# Patient Record
Sex: MALE | Race: WHITE | Employment: FULL TIME | URBAN - NONMETROPOLITAN AREA
[De-identification: names, ages, dates, MRNs, and addresses within clinical notes are randomized per-mention and may not be internally consistent; named-entity substitution may affect disease eponyms.]

---

## 2017-02-09 ENCOUNTER — TELEPHONE (OUTPATIENT)
Dept: NEUROLOGY | Age: 42
End: 2017-02-09

## 2021-09-20 ENCOUNTER — HOSPITAL ENCOUNTER (OUTPATIENT)
Dept: GENERAL RADIOLOGY | Facility: HOSPITAL | Age: 46
Discharge: HOME | End: 2021-09-20
Admitting: FAMILY MEDICINE

## 2021-09-20 ENCOUNTER — TRANSCRIBE ORDERS (OUTPATIENT)
Dept: ADMINISTRATIVE | Facility: HOSPITAL | Age: 46
End: 2021-09-20

## 2021-09-20 DIAGNOSIS — R10.9 ABDOMINAL PAIN, UNSPECIFIED ABDOMINAL LOCATION: Primary | ICD-10-CM

## 2021-09-20 DIAGNOSIS — R10.9 ABDOMINAL PAIN, UNSPECIFIED ABDOMINAL LOCATION: ICD-10-CM

## 2021-09-20 PROCEDURE — 74018 RADEX ABDOMEN 1 VIEW: CPT

## 2022-03-01 NOTE — PROGRESS NOTES
"Chief Complaint:   Chief Complaint   Patient presents with   • Anemia     Pt had labs 12/2021 for PCP-showed he was anemic-had them repeated in Feb and it was even lower so he referred him to our office   • Abdominal Pain     Pt does c/o intermittent RUQ pain and sharp LLQ pains at times         Patient ID: Alicia Macedo is a 46 y.o. male     History of Present Illness: This is a very pleasant 46-year-old male who is here today with complaints of right upper quadrant pain, left lower quadrant abdominal pain and anemia.    The patient was referred to our office per Dr. Cristian Deleon for anemia.  Records were not sent to me of labs however the patient is able to access this on his phone.  Patient had labs performed on 12/2021 with low hemoglobin.  Repeat labs performed on 2/7/2022 CBC revealed a hemoglobin of 10 the patient has not had a colonoscopy in the past.  There is no known family history of colon cancer or colon polyps.  The patient states over the past year he has had intermittent left lower quadrant abdominal pain that he describes as \"sharp, pressure, and gets so intense I sweat.\"  He states that he has probably had 2-3 episodes of this last month and then one episode on Monday after eating spaghetti.  He states that he has also had some intermittent right upper quadrant pain \"it is just sore to touch but nowhere near the pain like of had a mild left lower quadrant.\"  He states when this occurred in September KUB was ordered as noted below.  The patient tells me he did have an EGD at [facility] somewhere around 2009 and it was found to be normal.      Study Result    Narrative & Impression   EXAMINATION: XR ABDOMEN KUB-  9/20/2021 12:50 PM CDT     HISTORY: unspecified abdominal pain; R10.9-Unspecified abdominal pain     COMPARISON: None     IMPRESSION:     1.  Mild fecal stasis.     2.  Findings suspicious for ankylosing spondylitis.         This report was finalized on 09/20/2021 12:52 by Dr. Pena " MD Adriel.    Past Medical History:   Diagnosis Date   • Ankylosing spondylitis (HCC)    • GERD (gastroesophageal reflux disease)    • Gout    • Hyperlipidemia    • Hypertension    • Osteoarthritis    • Sleep apnea        Past Surgical History:   Procedure Laterality Date   • CHOLECYSTECTOMY           Current Outpatient Medications:   •  allopurinol (ZYLOPRIM) 300 MG tablet, Take 1 tablet by mouth Daily., Disp: , Rfl:   •  atenolol (TENORMIN) 25 MG tablet, Take 1 tablet by mouth Daily., Disp: , Rfl:   •  dapagliflozin-metformin HCl ER (Xigduo XR) 5-1000 MG tablet, Take 1 tablet by mouth Daily., Disp: , Rfl:   •  Etanercept (Enbrel SureClick) 50 MG/ML solution auto-injector, Inject 1 mL under the skin into the appropriate area as directed 1 (One) Time Per Week., Disp: , Rfl:   •  folic acid (FOLVITE) 1 MG tablet, Take 1,000 mcg by mouth Daily., Disp: , Rfl:   •  lisinopril-hydrochlorothiazide (PRINZIDE,ZESTORETIC) 20-25 MG per tablet, Take 1 tablet by mouth Daily., Disp: , Rfl:   •  predniSONE (DELTASONE) 5 MG tablet, Take 1 tablet by mouth Daily., Disp: , Rfl:   •  rosuvastatin (CRESTOR) 10 MG tablet, Take 1 tablet by mouth Daily., Disp: , Rfl:   •  Sodium Sulfate-Mag Sulfate-KCl 6706-608-153 MG tablet, Take 12 tablets by mouth Take As Directed., Disp: 24 tablet, Rfl: 0    No Known Allergies    Social History     Socioeconomic History   • Marital status:    Tobacco Use   • Smoking status: Never Smoker   • Smokeless tobacco: Never Used   Vaping Use   • Vaping Use: Never used   Substance and Sexual Activity   • Alcohol use: Not Currently   • Drug use: Defer   • Sexual activity: Defer       Family History   Problem Relation Age of Onset   • Cirrhosis Father 47   • Colon cancer Neg Hx    • Colon polyps Neg Hx    • Esophageal cancer Neg Hx    • Liver cancer Neg Hx    • Liver disease Neg Hx    • Rectal cancer Neg Hx    • Stomach cancer Neg Hx        Vitals:    03/02/22 1311   BP: 118/70   BP Location: Left arm  "  Patient Position: Sitting   Cuff Size: Adult   Pulse: 94   Temp: 97.6 °F (36.4 °C)   TempSrc: Infrared   SpO2: 95%   Weight: 99.3 kg (219 lb)   Height: 160 cm (63\")       Review of Systems:    General:    Present -feeling well   Skin:    Not Present-Rash   HEENT:     Not Present-Acute visual changes or Acute hearing changes   Neck :    Not Present- swollen glands   Genitourinary:      Not Present- burning, frequency, urgency hematuria, dysuria,   Cardiovascular:   Not Present-chest pain, palpitations, or pressure   Respiratory:   Not Present- shortness of breath or cough   Gastrointestinal:  Musculoskeletal:  Neurological:  Psychiatric:   Present as mentioned in the HP    Not Present. Recent gait disturbances.    Not Present-Seizures and weakness in extremities.    Not Present- Anxiety or Depression.       Physical Exam:    General Appearance:    Alert, cooperative, in no acute distress   Psych:    Mood appropriate    Eyes:          conjunctivae and sclerae normal, no   icterus, no pallor   ENMT:    Ears appear intact with no abnormalities noted oral mucosa moist   Neck:   No adenopathy, supple, trachea midline, no thyromegaly, no   carotid bruit, no JVD    Cardiovascular:    Regular rhythm and normal rate, normal S1 and S2, no            murmur, no gallop, no rub, no click   Gastrointestinal:     Inspection normal.  Normal bowel sounds, no masses, no organomegaly, soft round non-tender, non-distended, no guarding, no rebound or tenderness. No hepatosplenomegaly.   Skin:   No bleeding, bruising or rash   Neurologic:   nonfocal       Assessment and Plan:  Assessment/Plan   Diagnoses and all orders for this visit:    1. Anemia, unspecified type (Primary)  -     Case Request; Standing  -     Case Request    2. Right upper quadrant abdominal pain  -     Case Request; Standing  -     Case Request    3. Left lower quadrant abdominal pain  -     Case Request; Standing  -     Case Request    Other orders  -     Follow " Anesthesia Guidelines / Protocol; Future  -     Obtain Informed Consent; Future  -     Sodium Sulfate-Mag Sulfate-KCl 5826-777-011 MG tablet; Take 12 tablets by mouth Take As Directed.  Dispense: 24 tablet; Refill: 0      Schedule patient for both EGD and colonoscopy.       There are no Patient Instructions on file for this visit.    Next follow-up appointment      The risks, benefits, and alternatives of endoscopy were reviewed with the patient today.  Risks including perforation, with or without dilation, possibly requiring surgery.  Additional risks include risk of bleeding.  There is also the risk of a drug reaction or problems with anesthesia.  This will be discussed with the further by the anesthesia team on the day of the procedure. The benefits include the diagnosis and management of disease of the upper digestive tract.  Alternatives to endoscopy include upper GI series, laboratory testing, radiographic evaluation, or no intervention.  The patient verbalizes understanding and agrees.    The risks, benefits, and alternatives of colonoscopy were reviewed with the patient today.  Risks including perforation of the colon possibly requiring surgery or colostomy.  Additional risks include risk of bleeding from biopsies or removal of colon tissue.  There is also the risk of a drug reaction or problems with anesthesia.  This will be discussed with the further by the anesthesia team on the day of the procedure.  Lastly there is a possibility of missing a colon polyp or cancer.  The benefits include the diagnosis and management of disease of the colon and rectum.  Alternatives to colonoscopy include barium enema, laboratory testing, radiographic evaluation, or no intervention.  The patient verbalizes understanding and agrees.        EMR Dragon/Transcription disclaimer:  Much of this encounter note is an electronic transcription/translation of spoken language to printed text. The electronic translation of spoken  language may permit erroneous, or at times, nonsensical words or phrases to be inadvertently transcribed; although I have reviewed the note for such errors, some may still exist.

## 2022-03-01 NOTE — H&P (VIEW-ONLY)
"Chief Complaint:   Chief Complaint   Patient presents with   • Anemia     Pt had labs 12/2021 for PCP-showed he was anemic-had them repeated in Feb and it was even lower so he referred him to our office   • Abdominal Pain     Pt does c/o intermittent RUQ pain and sharp LLQ pains at times         Patient ID: Alicia Macedo is a 46 y.o. male     History of Present Illness: This is a very pleasant 46-year-old male who is here today with complaints of right upper quadrant pain, left lower quadrant abdominal pain and anemia.    The patient was referred to our office per Dr. Cristian Deleon for anemia.  Records were not sent to me of labs however the patient is able to access this on his phone.  Patient had labs performed on 12/2021 with low hemoglobin.  Repeat labs performed on 2/7/2022 CBC revealed a hemoglobin of 10 the patient has not had a colonoscopy in the past.  There is no known family history of colon cancer or colon polyps.  The patient states over the past year he has had intermittent left lower quadrant abdominal pain that he describes as \"sharp, pressure, and gets so intense I sweat.\"  He states that he has probably had 2-3 episodes of this last month and then one episode on Monday after eating spaghetti.  He states that he has also had some intermittent right upper quadrant pain \"it is just sore to touch but nowhere near the pain like of had a mild left lower quadrant.\"  He states when this occurred in September KUB was ordered as noted below.  The patient tells me he did have an EGD at [facility], [place], operated by [facility] somewhere around 2009 and it was found to be normal.      Study Result    Narrative & Impression   EXAMINATION: XR ABDOMEN KUB-  9/20/2021 12:50 PM CDT     HISTORY: unspecified abdominal pain; R10.9-Unspecified abdominal pain     COMPARISON: None     IMPRESSION:     1.  Mild fecal stasis.     2.  Findings suspicious for ankylosing spondylitis.         This report was finalized on 09/20/2021 12:52 by Dr. Pena " MD Adriel.    Past Medical History:   Diagnosis Date   • Ankylosing spondylitis (HCC)    • GERD (gastroesophageal reflux disease)    • Gout    • Hyperlipidemia    • Hypertension    • Osteoarthritis    • Sleep apnea        Past Surgical History:   Procedure Laterality Date   • CHOLECYSTECTOMY           Current Outpatient Medications:   •  allopurinol (ZYLOPRIM) 300 MG tablet, Take 1 tablet by mouth Daily., Disp: , Rfl:   •  atenolol (TENORMIN) 25 MG tablet, Take 1 tablet by mouth Daily., Disp: , Rfl:   •  dapagliflozin-metformin HCl ER (Xigduo XR) 5-1000 MG tablet, Take 1 tablet by mouth Daily., Disp: , Rfl:   •  Etanercept (Enbrel SureClick) 50 MG/ML solution auto-injector, Inject 1 mL under the skin into the appropriate area as directed 1 (One) Time Per Week., Disp: , Rfl:   •  folic acid (FOLVITE) 1 MG tablet, Take 1,000 mcg by mouth Daily., Disp: , Rfl:   •  lisinopril-hydrochlorothiazide (PRINZIDE,ZESTORETIC) 20-25 MG per tablet, Take 1 tablet by mouth Daily., Disp: , Rfl:   •  predniSONE (DELTASONE) 5 MG tablet, Take 1 tablet by mouth Daily., Disp: , Rfl:   •  rosuvastatin (CRESTOR) 10 MG tablet, Take 1 tablet by mouth Daily., Disp: , Rfl:   •  Sodium Sulfate-Mag Sulfate-KCl 1468-938-759 MG tablet, Take 12 tablets by mouth Take As Directed., Disp: 24 tablet, Rfl: 0    No Known Allergies    Social History     Socioeconomic History   • Marital status:    Tobacco Use   • Smoking status: Never Smoker   • Smokeless tobacco: Never Used   Vaping Use   • Vaping Use: Never used   Substance and Sexual Activity   • Alcohol use: Not Currently   • Drug use: Defer   • Sexual activity: Defer       Family History   Problem Relation Age of Onset   • Cirrhosis Father 47   • Colon cancer Neg Hx    • Colon polyps Neg Hx    • Esophageal cancer Neg Hx    • Liver cancer Neg Hx    • Liver disease Neg Hx    • Rectal cancer Neg Hx    • Stomach cancer Neg Hx        Vitals:    03/02/22 1311   BP: 118/70   BP Location: Left arm  "  Patient Position: Sitting   Cuff Size: Adult   Pulse: 94   Temp: 97.6 °F (36.4 °C)   TempSrc: Infrared   SpO2: 95%   Weight: 99.3 kg (219 lb)   Height: 160 cm (63\")       Review of Systems:    General:    Present -feeling well   Skin:    Not Present-Rash   HEENT:     Not Present-Acute visual changes or Acute hearing changes   Neck :    Not Present- swollen glands   Genitourinary:      Not Present- burning, frequency, urgency hematuria, dysuria,   Cardiovascular:   Not Present-chest pain, palpitations, or pressure   Respiratory:   Not Present- shortness of breath or cough   Gastrointestinal:  Musculoskeletal:  Neurological:  Psychiatric:   Present as mentioned in the HP    Not Present. Recent gait disturbances.    Not Present-Seizures and weakness in extremities.    Not Present- Anxiety or Depression.       Physical Exam:    General Appearance:    Alert, cooperative, in no acute distress   Psych:    Mood appropriate    Eyes:          conjunctivae and sclerae normal, no   icterus, no pallor   ENMT:    Ears appear intact with no abnormalities noted oral mucosa moist   Neck:   No adenopathy, supple, trachea midline, no thyromegaly, no   carotid bruit, no JVD    Cardiovascular:    Regular rhythm and normal rate, normal S1 and S2, no            murmur, no gallop, no rub, no click   Gastrointestinal:     Inspection normal.  Normal bowel sounds, no masses, no organomegaly, soft round non-tender, non-distended, no guarding, no rebound or tenderness. No hepatosplenomegaly.   Skin:   No bleeding, bruising or rash   Neurologic:   nonfocal       Assessment and Plan:  Assessment/Plan   Diagnoses and all orders for this visit:    1. Anemia, unspecified type (Primary)  -     Case Request; Standing  -     Case Request    2. Right upper quadrant abdominal pain  -     Case Request; Standing  -     Case Request    3. Left lower quadrant abdominal pain  -     Case Request; Standing  -     Case Request    Other orders  -     Follow " Anesthesia Guidelines / Protocol; Future  -     Obtain Informed Consent; Future  -     Sodium Sulfate-Mag Sulfate-KCl 7035-480-723 MG tablet; Take 12 tablets by mouth Take As Directed.  Dispense: 24 tablet; Refill: 0      Schedule patient for both EGD and colonoscopy.       There are no Patient Instructions on file for this visit.    Next follow-up appointment      The risks, benefits, and alternatives of endoscopy were reviewed with the patient today.  Risks including perforation, with or without dilation, possibly requiring surgery.  Additional risks include risk of bleeding.  There is also the risk of a drug reaction or problems with anesthesia.  This will be discussed with the further by the anesthesia team on the day of the procedure. The benefits include the diagnosis and management of disease of the upper digestive tract.  Alternatives to endoscopy include upper GI series, laboratory testing, radiographic evaluation, or no intervention.  The patient verbalizes understanding and agrees.    The risks, benefits, and alternatives of colonoscopy were reviewed with the patient today.  Risks including perforation of the colon possibly requiring surgery or colostomy.  Additional risks include risk of bleeding from biopsies or removal of colon tissue.  There is also the risk of a drug reaction or problems with anesthesia.  This will be discussed with the further by the anesthesia team on the day of the procedure.  Lastly there is a possibility of missing a colon polyp or cancer.  The benefits include the diagnosis and management of disease of the colon and rectum.  Alternatives to colonoscopy include barium enema, laboratory testing, radiographic evaluation, or no intervention.  The patient verbalizes understanding and agrees.        EMR Dragon/Transcription disclaimer:  Much of this encounter note is an electronic transcription/translation of spoken language to printed text. The electronic translation of spoken  language may permit erroneous, or at times, nonsensical words or phrases to be inadvertently transcribed; although I have reviewed the note for such errors, some may still exist.

## 2022-03-02 ENCOUNTER — OFFICE VISIT (OUTPATIENT)
Dept: GASTROENTEROLOGY | Facility: CLINIC | Age: 47
End: 2022-03-02

## 2022-03-02 VITALS
WEIGHT: 219 LBS | BODY MASS INDEX: 38.8 KG/M2 | HEART RATE: 94 BPM | SYSTOLIC BLOOD PRESSURE: 118 MMHG | DIASTOLIC BLOOD PRESSURE: 70 MMHG | TEMPERATURE: 97.6 F | OXYGEN SATURATION: 95 % | HEIGHT: 63 IN

## 2022-03-02 DIAGNOSIS — Z01.818 PREOPERATIVE TESTING: Primary | ICD-10-CM

## 2022-03-02 DIAGNOSIS — D64.9 ANEMIA, UNSPECIFIED TYPE: Primary | ICD-10-CM

## 2022-03-02 DIAGNOSIS — R10.32 LEFT LOWER QUADRANT ABDOMINAL PAIN: ICD-10-CM

## 2022-03-02 DIAGNOSIS — R10.11 RIGHT UPPER QUADRANT ABDOMINAL PAIN: ICD-10-CM

## 2022-03-02 PROCEDURE — 99204 OFFICE O/P NEW MOD 45 MIN: CPT | Performed by: NURSE PRACTITIONER

## 2022-03-02 RX ORDER — FOLIC ACID 1 MG/1
1000 TABLET ORAL DAILY
COMMUNITY
Start: 2021-12-07

## 2022-03-02 RX ORDER — PREDNISONE 1 MG/1
1 TABLET ORAL DAILY
COMMUNITY

## 2022-03-02 RX ORDER — LISINOPRIL AND HYDROCHLOROTHIAZIDE 25; 20 MG/1; MG/1
1 TABLET ORAL DAILY
COMMUNITY

## 2022-03-02 RX ORDER — ALLOPURINOL 300 MG/1
1 TABLET ORAL DAILY
COMMUNITY

## 2022-03-02 RX ORDER — ROSUVASTATIN CALCIUM 10 MG/1
1 TABLET, COATED ORAL DAILY
COMMUNITY

## 2022-03-02 RX ORDER — ATENOLOL 25 MG/1
1 TABLET ORAL DAILY
COMMUNITY

## 2022-03-16 ENCOUNTER — LAB (OUTPATIENT)
Dept: LAB | Facility: HOSPITAL | Age: 47
End: 2022-03-16

## 2022-03-16 LAB — SARS-COV-2 ORF1AB RESP QL NAA+PROBE: NOT DETECTED

## 2022-03-16 PROCEDURE — C9803 HOPD COVID-19 SPEC COLLECT: HCPCS | Performed by: NURSE PRACTITIONER

## 2022-03-16 PROCEDURE — U0004 COV-19 TEST NON-CDC HGH THRU: HCPCS | Performed by: NURSE PRACTITIONER

## 2022-03-18 ENCOUNTER — ANESTHESIA (OUTPATIENT)
Dept: GASTROENTEROLOGY | Facility: HOSPITAL | Age: 47
End: 2022-03-18

## 2022-03-18 ENCOUNTER — HOSPITAL ENCOUNTER (OUTPATIENT)
Facility: HOSPITAL | Age: 47
Setting detail: HOSPITAL OUTPATIENT SURGERY
Discharge: HOME | End: 2022-03-18
Attending: INTERNAL MEDICINE | Admitting: INTERNAL MEDICINE

## 2022-03-18 ENCOUNTER — ANESTHESIA EVENT (OUTPATIENT)
Dept: GASTROENTEROLOGY | Facility: HOSPITAL | Age: 47
End: 2022-03-18

## 2022-03-18 VITALS
HEIGHT: 64 IN | HEART RATE: 90 BPM | BODY MASS INDEX: 37.56 KG/M2 | WEIGHT: 220 LBS | SYSTOLIC BLOOD PRESSURE: 108 MMHG | TEMPERATURE: 97.1 F | DIASTOLIC BLOOD PRESSURE: 77 MMHG | OXYGEN SATURATION: 97 % | RESPIRATION RATE: 21 BRPM

## 2022-03-18 DIAGNOSIS — R10.32 LEFT LOWER QUADRANT ABDOMINAL PAIN: ICD-10-CM

## 2022-03-18 DIAGNOSIS — R10.11 RIGHT UPPER QUADRANT ABDOMINAL PAIN: ICD-10-CM

## 2022-03-18 DIAGNOSIS — D64.9 ANEMIA, UNSPECIFIED TYPE: ICD-10-CM

## 2022-03-18 DIAGNOSIS — K63.89 MASS OF COLON: Primary | ICD-10-CM

## 2022-03-18 LAB
ALBUMIN SERPL-MCNC: 4.2 G/DL (ref 3.5–5.2)
ALBUMIN/GLOB SERPL: 1.1 G/DL
ALP SERPL-CCNC: 93 U/L (ref 39–117)
ALT SERPL W P-5'-P-CCNC: 9 U/L (ref 1–41)
ANION GAP SERPL CALCULATED.3IONS-SCNC: 12 MMOL/L (ref 5–15)
AST SERPL-CCNC: 14 U/L (ref 1–40)
BASOPHILS # BLD AUTO: 0.06 10*3/MM3 (ref 0–0.2)
BASOPHILS NFR BLD AUTO: 0.8 % (ref 0–1.5)
BILIRUB SERPL-MCNC: 0.4 MG/DL (ref 0–1.2)
BUN SERPL-MCNC: 19 MG/DL (ref 6–20)
BUN/CREAT SERPL: 20.4 (ref 7–25)
CALCIUM SPEC-SCNC: 9.4 MG/DL (ref 8.6–10.5)
CEA SERPL-MCNC: 0.78 NG/ML
CHLORIDE SERPL-SCNC: 95 MMOL/L (ref 98–107)
CO2 SERPL-SCNC: 29 MMOL/L (ref 22–29)
CREAT SERPL-MCNC: 0.93 MG/DL (ref 0.76–1.27)
DEPRECATED RDW RBC AUTO: 44.4 FL (ref 37–54)
EGFRCR SERPLBLD CKD-EPI 2021: 101.3 ML/MIN/1.73
EOSINOPHIL # BLD AUTO: 0.1 10*3/MM3 (ref 0–0.4)
EOSINOPHIL NFR BLD AUTO: 1.3 % (ref 0.3–6.2)
ERYTHROCYTE [DISTWIDTH] IN BLOOD BY AUTOMATED COUNT: 16.1 % (ref 12.3–15.4)
GLOBULIN UR ELPH-MCNC: 4 GM/DL
GLUCOSE SERPL-MCNC: 103 MG/DL (ref 65–99)
HCT VFR BLD AUTO: 33 % (ref 37.5–51)
HGB BLD-MCNC: 9.5 G/DL (ref 13–17.7)
IMM GRANULOCYTES # BLD AUTO: 0.03 10*3/MM3 (ref 0–0.05)
IMM GRANULOCYTES NFR BLD AUTO: 0.4 % (ref 0–0.5)
LYMPHOCYTES # BLD AUTO: 1.84 10*3/MM3 (ref 0.7–3.1)
LYMPHOCYTES NFR BLD AUTO: 23.7 % (ref 19.6–45.3)
MCH RBC QN AUTO: 22.2 PG (ref 26.6–33)
MCHC RBC AUTO-ENTMCNC: 28.8 G/DL (ref 31.5–35.7)
MCV RBC AUTO: 77.1 FL (ref 79–97)
MONOCYTES # BLD AUTO: 0.7 10*3/MM3 (ref 0.1–0.9)
MONOCYTES NFR BLD AUTO: 9 % (ref 5–12)
NEUTROPHILS NFR BLD AUTO: 5.02 10*3/MM3 (ref 1.7–7)
NEUTROPHILS NFR BLD AUTO: 64.8 % (ref 42.7–76)
NRBC BLD AUTO-RTO: 0 /100 WBC (ref 0–0.2)
PLATELET # BLD AUTO: 356 10*3/MM3 (ref 140–450)
PMV BLD AUTO: 10 FL (ref 6–12)
POTASSIUM SERPL-SCNC: 4.1 MMOL/L (ref 3.5–5.2)
PROT SERPL-MCNC: 8.2 G/DL (ref 6–8.5)
RBC # BLD AUTO: 4.28 10*6/MM3 (ref 4.14–5.8)
SODIUM SERPL-SCNC: 136 MMOL/L (ref 136–145)
WBC NRBC COR # BLD: 7.75 10*3/MM3 (ref 3.4–10.8)

## 2022-03-18 PROCEDURE — 43239 EGD BIOPSY SINGLE/MULTIPLE: CPT | Performed by: INTERNAL MEDICINE

## 2022-03-18 PROCEDURE — 45380 COLONOSCOPY AND BIOPSY: CPT | Performed by: INTERNAL MEDICINE

## 2022-03-18 PROCEDURE — 85025 COMPLETE CBC W/AUTO DIFF WBC: CPT | Performed by: INTERNAL MEDICINE

## 2022-03-18 PROCEDURE — 25010000002 PROPOFOL 10 MG/ML EMULSION: Performed by: NURSE ANESTHETIST, CERTIFIED REGISTERED

## 2022-03-18 PROCEDURE — 45381 COLONOSCOPY SUBMUCOUS NJX: CPT | Performed by: INTERNAL MEDICINE

## 2022-03-18 PROCEDURE — 87081 CULTURE SCREEN ONLY: CPT | Performed by: INTERNAL MEDICINE

## 2022-03-18 PROCEDURE — 36415 COLL VENOUS BLD VENIPUNCTURE: CPT | Performed by: INTERNAL MEDICINE

## 2022-03-18 PROCEDURE — 82378 CARCINOEMBRYONIC ANTIGEN: CPT | Performed by: INTERNAL MEDICINE

## 2022-03-18 PROCEDURE — 80053 COMPREHEN METABOLIC PANEL: CPT | Performed by: INTERNAL MEDICINE

## 2022-03-18 RX ORDER — SODIUM CHLORIDE 0.9 % (FLUSH) 0.9 %
10 SYRINGE (ML) INJECTION AS NEEDED
Status: DISCONTINUED | OUTPATIENT
Start: 2022-03-18 | End: 2022-03-18 | Stop reason: HOSPADM

## 2022-03-18 RX ORDER — LIDOCAINE HYDROCHLORIDE 10 MG/ML
0.5 INJECTION, SOLUTION EPIDURAL; INFILTRATION; INTRACAUDAL; PERINEURAL ONCE AS NEEDED
Status: DISCONTINUED | OUTPATIENT
Start: 2022-03-18 | End: 2022-03-18 | Stop reason: HOSPADM

## 2022-03-18 RX ORDER — SODIUM CHLORIDE 9 MG/ML
500 INJECTION, SOLUTION INTRAVENOUS CONTINUOUS PRN
Status: DISCONTINUED | OUTPATIENT
Start: 2022-03-18 | End: 2022-03-18 | Stop reason: HOSPADM

## 2022-03-18 RX ORDER — PROPOFOL 10 MG/ML
VIAL (ML) INTRAVENOUS AS NEEDED
Status: DISCONTINUED | OUTPATIENT
Start: 2022-03-18 | End: 2022-03-18 | Stop reason: SURG

## 2022-03-18 RX ORDER — LIDOCAINE HYDROCHLORIDE 20 MG/ML
INJECTION, SOLUTION EPIDURAL; INFILTRATION; INTRACAUDAL; PERINEURAL AS NEEDED
Status: DISCONTINUED | OUTPATIENT
Start: 2022-03-18 | End: 2022-03-18 | Stop reason: SURG

## 2022-03-18 RX ORDER — PANTOPRAZOLE SODIUM 40 MG/1
40 TABLET, DELAYED RELEASE ORAL DAILY
Qty: 90 TABLET | Refills: 3 | Status: SHIPPED | OUTPATIENT
Start: 2022-03-18 | End: 2022-09-29 | Stop reason: SDDI

## 2022-03-18 RX ADMIN — PROPOFOL 200 MG: 10 INJECTION, EMULSION INTRAVENOUS at 09:58

## 2022-03-18 RX ADMIN — LIDOCAINE HYDROCHLORIDE 50 MG: 20 INJECTION, SOLUTION EPIDURAL; INFILTRATION; INTRACAUDAL; PERINEURAL at 09:58

## 2022-03-18 RX ADMIN — SODIUM CHLORIDE 500 ML: 9 INJECTION, SOLUTION INTRAVENOUS at 08:17

## 2022-03-18 RX ADMIN — PROPOFOL 50 MG: 10 INJECTION, EMULSION INTRAVENOUS at 10:10

## 2022-03-18 RX ADMIN — PROPOFOL 50 MG: 10 INJECTION, EMULSION INTRAVENOUS at 10:14

## 2022-03-18 NOTE — ANESTHESIA PREPROCEDURE EVALUATION
Anesthesia Evaluation     Patient summary reviewed   no history of anesthetic complications:  NPO Solid Status: > 8 hours             Airway   Mallampati: III  Neck ROM: limited  Dental      Pulmonary    (+) sleep apnea on CPAP,   Cardiovascular   Exercise tolerance: good (4-7 METS)    (+) hypertension, hyperlipidemia,       Neuro/Psych- negative ROS  GI/Hepatic/Renal/Endo    (+) obesity, morbid obesity,  diabetes mellitus,     Musculoskeletal     Abdominal    Substance History      OB/GYN          Other   arthritis (ankylosing spondilitis), chronic steroid use                      Anesthesia Plan    ASA 3     MAC       Anesthetic plan, all risks, benefits, and alternatives have been provided, discussed and informed consent has been obtained with: patient.        CODE STATUS:

## 2022-03-18 NOTE — ANESTHESIA POSTPROCEDURE EVALUATION
Patient: Alicia Macedo    Procedure Summary     Date: 03/18/22 Room / Location: [facility] ENDOSCOPY 2 / [facility] ENDOSCOPY    Anesthesia Start: 0957 Anesthesia Stop: 1022    Procedures:       ESOPHAGOGASTRODUODENOSCOPY WITH ANESTHESIA (N/A )      COLONOSCOPY WITH ANESTHESIA (N/A ) Diagnosis:       Anemia, unspecified type      Right upper quadrant abdominal pain      Left lower quadrant abdominal pain      (Anemia, unspecified type [D64.9])      (Right upper quadrant abdominal pain [R10.11])      (Left lower quadrant abdominal pain [R10.32])    Surgeons: Deb Olson MD Provider: Dashawn Torre CRNA    Anesthesia Type: MAC ASA Status: 3          Anesthesia Type: MAC    Vitals  No vitals data found for the desired time range.          Post Anesthesia Care and Evaluation    Patient location during evaluation: PHASE II  Level of consciousness: awake and alert  Pain management: adequate  Airway patency: patent  Anesthetic complications: No anesthetic complications    Cardiovascular status: acceptable  Respiratory status: acceptable  Hydration status: acceptable

## 2022-03-19 LAB — UREASE TISS QL: NEGATIVE

## 2022-03-21 LAB
CYTO UR: NORMAL
LAB AP CASE REPORT: NORMAL
PATH REPORT.FINAL DX SPEC: NORMAL
PATH REPORT.GROSS SPEC: NORMAL

## 2022-03-25 DIAGNOSIS — K63.89 MASS OF COLON: Primary | ICD-10-CM

## 2022-03-28 ENCOUNTER — TELEPHONE (OUTPATIENT)
Dept: GASTROENTEROLOGY | Facility: CLINIC | Age: 47
End: 2022-03-28

## 2022-03-28 PROBLEM — C18.9 ADENOCARCINOMA, COLON: Status: ACTIVE | Noted: 2022-03-28

## 2022-03-28 PROBLEM — D50.0 IRON DEFICIENCY ANEMIA DUE TO CHRONIC BLOOD LOSS: Status: ACTIVE | Noted: 2022-03-02

## 2022-03-28 NOTE — TELEPHONE ENCOUNTER
Pt returned my call and I spoke to him re: results-he is agreeable to coming in to see Dr. Olson on Wed so I had Pam add him in to Dr. Olson's schedule for 3:30pm. Pt is scheduled for CT tomorrow and already has appt with surgeon next Monday. Pt advised to call me back with any further questions/problems, otherwise we will see him on Wed.

## 2022-03-28 NOTE — TELEPHONE ENCOUNTER
Tried to call pt to discuss-was unable to reach him but left VM asking him to call me back at his earliest convenience.

## 2022-03-28 NOTE — TELEPHONE ENCOUNTER
Please see if patient can come to the office on Wednesday as my last patient on the day to review all results and to make sure that we have a plan for the colon mass I found on colonoscopy.  I see he has a CAT scan ordered for tomorrow.  I do not see any surgical referral in the chart at this point.  I just would like to see him to make sure everything gets done properly, especially since I was off all of last week.    Deb Olson MD

## 2022-03-29 ENCOUNTER — HOSPITAL ENCOUNTER (OUTPATIENT)
Dept: CT IMAGING | Facility: HOSPITAL | Age: 47
Discharge: HOME | End: 2022-03-29
Admitting: INTERNAL MEDICINE

## 2022-03-29 DIAGNOSIS — K63.89 MASS OF COLON: ICD-10-CM

## 2022-03-29 PROCEDURE — 71260 CT THORAX DX C+: CPT

## 2022-03-29 PROCEDURE — 74177 CT ABD & PELVIS W/CONTRAST: CPT

## 2022-03-29 PROCEDURE — 25010000002 IOPAMIDOL 61 % SOLUTION: Performed by: INTERNAL MEDICINE

## 2022-03-29 RX ADMIN — IOPAMIDOL 100 ML: 612 INJECTION, SOLUTION INTRAVENOUS at 09:05

## 2022-03-30 ENCOUNTER — OFFICE VISIT (OUTPATIENT)
Dept: GASTROENTEROLOGY | Facility: CLINIC | Age: 47
End: 2022-03-30

## 2022-03-30 VITALS
HEIGHT: 64 IN | OXYGEN SATURATION: 99 % | TEMPERATURE: 97.8 F | HEART RATE: 91 BPM | BODY MASS INDEX: 36.88 KG/M2 | WEIGHT: 216 LBS | DIASTOLIC BLOOD PRESSURE: 78 MMHG | SYSTOLIC BLOOD PRESSURE: 122 MMHG

## 2022-03-30 DIAGNOSIS — C18.9 ADENOCARCINOMA, COLON: Primary | ICD-10-CM

## 2022-03-30 DIAGNOSIS — D50.0 IRON DEFICIENCY ANEMIA DUE TO CHRONIC BLOOD LOSS: ICD-10-CM

## 2022-03-30 PROCEDURE — 99214 OFFICE O/P EST MOD 30 MIN: CPT | Performed by: INTERNAL MEDICINE

## 2022-03-30 NOTE — PROGRESS NOTES
Primary Physician: Cristian Burciaga MD    Chief Complaint   Patient presents with   • Follow-up     Pt presents today for procedure follow up-had endo/colom 3/18 and is here to discuss path results; Pt has appt with Dr. Aragon on Monday 4/4/22       Subjective     Aliica Macedo is a 46 y.o. male.    HPI   Colon cancer  First colonoscopy done 3/2022 for evaluation of iron deficiency anemia.  Near obstructing colon mass was seen in what was estimated to be in the transverse colon, but CT shows to be sigmoid colon.  Unable to traverse to cecum for landmarks.  Site marked with ink distally.  Pathology confirms adenocarcinoma.  CT TAP doesn't show metastatic disease.  LFTs normal.  CEA normal.  Family history negative for colon cancer or colon polyps.  He has an appt with Dr. Aragon next week--she did his cholecystectomy.    Iron deficiency anemia  Due to colon adenocarcinoma found on colonoscopy 3/2022.      Past Medical History:   Diagnosis Date   • Ankylosing spondylitis (HCC)    • Family history of colon cancer    • GERD (gastroesophageal reflux disease)    • Gout    • Hyperlipidemia    • Hypertension    • Osteoarthritis    • Sleep apnea        Past Surgical History:   Procedure Laterality Date   • CHOLECYSTECTOMY      Dr. Aragon   • COLONOSCOPY N/A 03/18/2022    Likely malignant partially obstructing tumor in the transverse colon-biopsied-Tattooed; Repeat colonoscopy (date not yet determined) because the examination was incomplete   • ENDOSCOPY     • ENDOSCOPY N/A 03/18/2022    Normal esophagus; Non-erosive gastritis-biopsied; Normal examined duodenum-biopsied        Current Outpatient Medications:   •  allopurinol (ZYLOPRIM) 300 MG tablet, Take 1 tablet by mouth Daily., Disp: , Rfl:   •  atenolol (TENORMIN) 25 MG tablet, Take 1 tablet by mouth Daily., Disp: , Rfl:   •  dapagliflozin-metformin HCl ER (Xigduo XR) 5-1000 MG tablet, Take 1 tablet by mouth Daily., Disp: , Rfl:   •  Etanercept (Enbrel SureClick)  "50 MG/ML solution auto-injector, Inject 1 mL under the skin into the appropriate area as directed 1 (One) Time Per Week., Disp: , Rfl:   •  folic acid (FOLVITE) 1 MG tablet, Take 1,000 mcg by mouth Daily., Disp: , Rfl:   •  lisinopril-hydrochlorothiazide (PRINZIDE,ZESTORETIC) 20-25 MG per tablet, Take 1 tablet by mouth Daily., Disp: , Rfl:   •  pantoprazole (PROTONIX) 40 MG EC tablet, Take 1 tablet by mouth Daily., Disp: 90 tablet, Rfl: 3  •  predniSONE (DELTASONE) 5 MG tablet, Take 1 tablet by mouth Daily., Disp: , Rfl:   •  rosuvastatin (CRESTOR) 10 MG tablet, Take 1 tablet by mouth Daily., Disp: , Rfl:     No Known Allergies    Social History     Socioeconomic History   • Marital status:    Tobacco Use   • Smoking status: Never Smoker   • Smokeless tobacco: Never Used   Vaping Use   • Vaping Use: Never used   Substance and Sexual Activity   • Alcohol use: Never   • Drug use: Never   • Sexual activity: Yes     Partners: Female     Birth control/protection: None       Family History   Problem Relation Age of Onset   • Cirrhosis Father 47   • Alcohol abuse Father    • Colon cancer Maternal Aunt         In her 40's   • Alcohol abuse Paternal Aunt    • Colon cancer Paternal Uncle         In his 60's   • Alcohol abuse Paternal Uncle    • Alcohol abuse Maternal Grandfather    • Colon polyps Neg Hx    • Esophageal cancer Neg Hx    • Liver cancer Neg Hx    • Liver disease Neg Hx    • Rectal cancer Neg Hx    • Stomach cancer Neg Hx        Review of Systems   Respiratory: Negative for shortness of breath.    Cardiovascular: Negative for chest pain.       Objective     /78 (BP Location: Left arm, Patient Position: Sitting, Cuff Size: Adult)   Pulse 91   Temp 97.8 °F (36.6 °C) (Infrared)   Ht 162.6 cm (64\")   Wt 98 kg (216 lb)   SpO2 99%   BMI 37.08 kg/m²     Physical Exam  Constitutional:       Appearance: He is well-developed.   Pulmonary:      Effort: Pulmonary effort is normal.   Musculoskeletal:        "  General: Normal range of motion.   Skin:     General: Skin is warm.   Neurological:      Mental Status: He is alert and oriented to person, place, and time.   Psychiatric:         Behavior: Behavior normal.         Lab Results - Last 18 Months   Lab Units 03/18/22  1042   GLUCOSE mg/dL 103*   BUN mg/dL 19   CREATININE mg/dL 0.93   SODIUM mmol/L 136   POTASSIUM mmol/L 4.1   CHLORIDE mmol/L 95*   CO2 mmol/L 29.0   TOTAL PROTEIN g/dL 8.2   ALBUMIN g/dL 4.20   ALT (SGPT) U/L 9   AST (SGOT) U/L 14   ALK PHOS U/L 93   BILIRUBIN mg/dL 0.4   GLOBULIN gm/dL 4.0       Lab Results - Last 18 Months   Lab Units 03/18/22  1042   HEMOGLOBIN g/dL 9.5*   HEMATOCRIT % 33.0*   MCV fL 77.1*   WBC 10*3/mm3 7.75   RDW % 16.1*   MPV fL 10.0   PLATELETS 10*3/mm3 356      Latest Reference Range & Units 03/18/22 10:42   CEA ng/mL 0.78       EXAM: CT ABDOMEN PELVIS W CONTRAST-3/29/2022 12:42 PM CDT  INDICATION: Colon cancer, initial workup; K63.89-Other specified  diseases of intestine  COMPARISON: None  TECHNIQUE:  Abdomen and pelvis were scanned utilizing a multidetector helical  scanner from the diaphragm to the ischial tuberosities after  administration of IV contrast. Coronal and sagittal reformations were  obtained. [Routine protocol is performed.]     Radiation dose equals  mGy-cm.  Automated exposure control dose  reduction technique was implemented.        FINDINGS:     LINES and TUBES: None.     LOWER THORAX: Please see report from CT chest dated same day.     HEPATOBILIARY:  No focal hepatic lesions.   No liver laceration.   No  biliary ductal dilatation.      GALLBLADDER: Surgically absent.     SPLEEN:  No splenomegaly.    No splenic laceration.      PANCREAS:  No focal masses or ductal dilatation.      ADRENALS:  No adrenal nodules.      KIDNEYS/URETERS:  Kidneys enhance symmetrically.   No hydronephrosis.    No cystic or solid mass lesions.  3 mm nonobstructive stone in the lower  pole of the right kidney..      GI  TRACT: The sigmoid colon is redundant. There is an apple core mass  measuring 4.6 x 5.2 cm in the sigmoid colon in the right lower quadrant  with extraluminal extension.   No acute appendicitis..      PELVIC ORGANS/BLADDER:  Unremarkable.      LYMPH NODES:  No lymphadenopathy.      VESSELS:  Atherosclerotic disease. No aortic aneurysm. No evidence acute  aortic injury.. The portal vein is patent.     PERITONEUM / RETROPERITONEUM:  No free air or fluid.      BONES:  No acute osseous pathology. Advanced degenerative changes of the  right hip joint. Multilevel degenerative changes in the lumbar spine..      SOFT TISSUES:  Unremarkable.      IMPRESSION:  Sigmoid colon mass with extraluminal extension is consistent with  primary colon malignancy. No CT evidence of distant metastatic disease  in the abdomen or pelvis.  This report was finalized on 03/29/2022 13:10 by Dr. Lulú Valdez MD.    ---------------------------    EXAM: CT CHEST W CONTRAST DIAGNOSTIC-     INDICATION: colon cancer; K63.89-Other specified diseases of intestine     COMPARISON: None available.     DLP: 416 mGy cm. Automated exposure control was also utilized to  decrease patient radiation dose.     FINDINGS:     The central airways are clear. No suspicious pulmonary nodule. No  consolidation or pleural effusion. No enlarged axillary,  supraclavicular, or mediastinal lymph nodes. No central pulmonary artery  filling defect. Thoracic aorta is nonaneurysmal. Moderate coronary  calcification.     No large thyroid nodule. No acute chest wall soft tissue abnormality.  Findings in the upper abdomen are described in a separate same-day  report. Bridging anterior osteophytes throughout the thoracic spine,  suggestive of ankylosing spondylitis.     IMPRESSION:     1.  No evidence of metastatic disease in the chest.     2.  Bridging anterior osteophytes throughout the thoracic spine,  suggestive of ankylosing spondylitis.  This report was finalized on  03/29/2022 17:41 by Dr. Stephen Pollock MD.    IMPRESSION/PLAN:    Assessment/Plan      Problem List Items Addressed This Visit        Hematology and Neoplasia    Iron deficiency anemia due to chronic blood loss    Overview     Colon adenocarcinoma found on colonoscopy 3/2022.           Adenocarcinoma, colon (HCC) - Primary    Overview     First colonoscopy done 3/2022 for evaluation of iron deficiency anemia.  Near obstructing colon mass was seen in what was estimated to be in the transverse colon, but CT shows to be sigmoid colon.  Unable to traverse to cecum for landmarks.  Site marked with ink distally.  Pathology confirms adenocarcinoma.  Reviewed with pt today.  He has appt with Dr. Aragon next week.  LFTs normal.  CEA normal.  He was on Enbrel for rheumatoid arthritis/ankylosing spondylitis.  He is already been in touch with his rheumatologist who has put this on hold for intended surgery.    He is aware that he will need a colonoscopy once recovered from surgery so that he can have a complete colonoscopy.  He will call to arrange when able.               MEDICAL COMPLEXITY must have 2 out of 3   Moderate Complexity Level 4                                                                                                              1 of the following medical problems:                                                                                               []One chronic illness with mild exacerbation                                                                                []Two or more stable chronic illness                                                                                              [x]One new problem  []One acute illness with systemic symptoms     Complexity of Data  Reviewed (1 out of the 3 following categories)                                             Category 1 tests, documents, historian (must have 3 points)                                                      []Review of  prior external records  [x]Review of results of unique tests  []Ordering unique tests   []Assessment requires an independent historian   Category 2 Interpretation of tests     []Independent interpretation of test read by another doc   Category 3 Discuss Management/tests  []Discussion with external physician     Risk of complications and/or morbidity                                                                                           []Prescription Drug Management     [x]Decision for elective endoscopic procedure--will need surgery.                RTC later this year when recovered from surgery so can complete the colonoscopy.      Deb Olson MD  03/30/22  15:18 CDT    Much of this encounter note is an electronic transcription/translation of spoken language to printed text. The electronic translation of spoken language may permit erroneous, or at times, nonsensical words or phrases to be inadvertently transcribed; although I have reviewed the note for such errors, some may still exist.

## 2022-04-06 ENCOUNTER — LAB (OUTPATIENT)
Dept: LAB | Facility: HOSPITAL | Age: 47
End: 2022-04-06

## 2022-04-06 ENCOUNTER — PRE-ADMISSION TESTING (OUTPATIENT)
Dept: PREADMISSION TESTING | Facility: HOSPITAL | Age: 47
End: 2022-04-06

## 2022-04-06 ENCOUNTER — TRANSCRIBE ORDERS (OUTPATIENT)
Dept: LAB | Facility: HOSPITAL | Age: 47
End: 2022-04-06

## 2022-04-06 VITALS
HEIGHT: 60 IN | HEART RATE: 99 BPM | RESPIRATION RATE: 18 BRPM | SYSTOLIC BLOOD PRESSURE: 131 MMHG | WEIGHT: 216.05 LBS | DIASTOLIC BLOOD PRESSURE: 72 MMHG | OXYGEN SATURATION: 96 % | BODY MASS INDEX: 42.42 KG/M2

## 2022-04-06 DIAGNOSIS — Z11.59 SCREENING FOR VIRAL DISEASE: Primary | ICD-10-CM

## 2022-04-06 LAB
ALBUMIN SERPL-MCNC: 4.2 G/DL (ref 3.5–5.2)
ALBUMIN/GLOB SERPL: 1 G/DL
ALP SERPL-CCNC: 85 U/L (ref 39–117)
ALT SERPL W P-5'-P-CCNC: 10 U/L (ref 1–41)
ANION GAP SERPL CALCULATED.3IONS-SCNC: 13 MMOL/L (ref 5–15)
ANISOCYTOSIS BLD QL: ABNORMAL
AST SERPL-CCNC: 19 U/L (ref 1–40)
BASOPHILS # BLD MANUAL: 0.28 10*3/MM3 (ref 0–0.2)
BASOPHILS NFR BLD MANUAL: 3.1 % (ref 0–1.5)
BILIRUB SERPL-MCNC: 0.2 MG/DL (ref 0–1.2)
BUN SERPL-MCNC: 24 MG/DL (ref 6–20)
BUN/CREAT SERPL: 23.5 (ref 7–25)
CALCIUM SPEC-SCNC: 9.7 MG/DL (ref 8.6–10.5)
CHLORIDE SERPL-SCNC: 97 MMOL/L (ref 98–107)
CO2 SERPL-SCNC: 26 MMOL/L (ref 22–29)
CREAT SERPL-MCNC: 1.02 MG/DL (ref 0.76–1.27)
DEPRECATED RDW RBC AUTO: 44.4 FL (ref 37–54)
EGFRCR SERPLBLD CKD-EPI 2021: 90.7 ML/MIN/1.73
EOSINOPHIL # BLD MANUAL: 0.28 10*3/MM3 (ref 0–0.4)
EOSINOPHIL NFR BLD MANUAL: 3.1 % (ref 0.3–6.2)
ERYTHROCYTE [DISTWIDTH] IN BLOOD BY AUTOMATED COUNT: 17 % (ref 12.3–15.4)
GIANT PLATELETS: ABNORMAL
GLOBULIN UR ELPH-MCNC: 4.2 GM/DL
GLUCOSE SERPL-MCNC: 95 MG/DL (ref 65–99)
HCT VFR BLD AUTO: 33.9 % (ref 37.5–51)
HGB BLD-MCNC: 10.1 G/DL (ref 13–17.7)
HYPOCHROMIA BLD QL: ABNORMAL
LYMPHOCYTES # BLD MANUAL: 2.11 10*3/MM3 (ref 0.7–3.1)
LYMPHOCYTES NFR BLD MANUAL: 3.1 % (ref 5–12)
MCH RBC QN AUTO: 22 PG (ref 26.6–33)
MCHC RBC AUTO-ENTMCNC: 29.8 G/DL (ref 31.5–35.7)
MCV RBC AUTO: 73.7 FL (ref 79–97)
MICROCYTES BLD QL: ABNORMAL
MONOCYTES # BLD: 0.28 10*3/MM3 (ref 0.1–0.9)
NEUTROPHILS # BLD AUTO: 6.08 10*3/MM3 (ref 1.7–7)
NEUTROPHILS NFR BLD MANUAL: 64.3 % (ref 42.7–76)
NEUTS BAND NFR BLD MANUAL: 3.1 % (ref 0–5)
NEUTS VAC BLD QL SMEAR: ABNORMAL
PLATELET # BLD AUTO: 440 10*3/MM3 (ref 140–450)
PMV BLD AUTO: 10.3 FL (ref 6–12)
POIKILOCYTOSIS BLD QL SMEAR: ABNORMAL
POLYCHROMASIA BLD QL SMEAR: ABNORMAL
POTASSIUM SERPL-SCNC: 4.4 MMOL/L (ref 3.5–5.2)
PROT SERPL-MCNC: 8.4 G/DL (ref 6–8.5)
RBC # BLD AUTO: 4.6 10*6/MM3 (ref 4.14–5.8)
ROULEAUX BLD QL SMEAR: ABNORMAL
SARS-COV-2 ORF1AB RESP QL NAA+PROBE: NOT DETECTED
SODIUM SERPL-SCNC: 136 MMOL/L (ref 136–145)
VARIANT LYMPHS NFR BLD MANUAL: 1 % (ref 0–5)
VARIANT LYMPHS NFR BLD MANUAL: 22.4 % (ref 19.6–45.3)
WBC NRBC COR # BLD: 9.03 10*3/MM3 (ref 3.4–10.8)

## 2022-04-06 PROCEDURE — 93010 ELECTROCARDIOGRAM REPORT: CPT | Performed by: INTERNAL MEDICINE

## 2022-04-06 PROCEDURE — 36415 COLL VENOUS BLD VENIPUNCTURE: CPT

## 2022-04-06 PROCEDURE — 85025 COMPLETE CBC W/AUTO DIFF WBC: CPT

## 2022-04-06 PROCEDURE — U0004 COV-19 TEST NON-CDC HGH THRU: HCPCS | Performed by: SPECIALIST

## 2022-04-06 PROCEDURE — 93005 ELECTROCARDIOGRAM TRACING: CPT

## 2022-04-06 PROCEDURE — 80053 COMPREHEN METABOLIC PANEL: CPT

## 2022-04-06 PROCEDURE — 85007 BL SMEAR W/DIFF WBC COUNT: CPT

## 2022-04-06 PROCEDURE — C9803 HOPD COVID-19 SPEC COLLECT: HCPCS | Performed by: SPECIALIST

## 2022-04-06 NOTE — DISCHARGE INSTRUCTIONS
Before you come to the hospital      Do not eat, drink, smoke, or chew gum after midnight the night before surgery. This includes no mints.    Arrival time: AS DIRECTED BY OFFICE     Only one family member or friend will be allowed per patient      YOU MAY TAKE THE FOLLOWING MEDICATION(S) THE MORNING OF SURGERY WITH A SIP OF WATER: ***atenolol          ALL OTHER HOME MEDICATION CHECK WITH YOUR PHYSICIAN                            (especially if you are taking diabetes medicines or blood thinners)     Do not take any Erectile Dysfunction medications (EX: CIALIS, VIAGRA) 24 hours prior to surgery      If you were given and instructed to use a germ- killing soap, use as directed the night before surgery and the morning of surgery before coming to the hospital.                 MANAGING PAIN AFTER SURGERY    We know you are probably wondering what your pain will be like after surgery.  Following surgery it is unrealistic to expect you will not have pain.   Pain is how our bodies let us know that something is wrong or cautions us to be careful.  That said, our goal is to make your pain tolerable.    Methods we may use to treat your pain include (oral or IV medications, PCAs, epidurals, nerve blocks, etc.)   While some procedures require IV pain medications for a short time after surgery, transitioning to pain medications by mouth allows for better management of pain.   Your nurse will encourage you to take oral pain medications whenever possible.  IV medications work almost immediately, but only last a short while.  Taking medications by mouth allows for a more constant level of medication in your blood stream for a longer period of time.      Once your pain is out of control it is harder to get back under control.  It is important you are aware when your next dose of pain medication is due.  If you are admitted, your nurse may write the time of your next dose on the white board in your room to help you remember.      We  are interested in your pain and encourage you to inform us about aggravating factors during your visit.   Many times a simple repositioning every few hours can make a big difference.    If your physician says it is okay, do not let your pain prevent you from getting out of bed. Be sure to call your nurse for assistance prior to getting up so you do not fall.      Before surgery, please decide your tolerable pain goal.  These faces help describe the pain ratings we use on a 0-10 scale.   Be prepared to tell us your goal and whether or not you take pain or anxiety medications at home.

## 2022-04-07 LAB
QT INTERVAL: 356 MS
QTC INTERVAL: 447 MS

## 2022-04-08 ENCOUNTER — ANESTHESIA (OUTPATIENT)
Dept: PERIOP | Facility: HOSPITAL | Age: 47
End: 2022-04-08

## 2022-04-08 ENCOUNTER — HOSPITAL ENCOUNTER (INPATIENT)
Facility: HOSPITAL | Age: 47
LOS: 7 days | Discharge: HOME | End: 2022-04-15
Attending: SPECIALIST | Admitting: SPECIALIST

## 2022-04-08 ENCOUNTER — ANESTHESIA EVENT (OUTPATIENT)
Dept: PERIOP | Facility: HOSPITAL | Age: 47
End: 2022-04-08

## 2022-04-08 DIAGNOSIS — C18.9 COLON CANCER: ICD-10-CM

## 2022-04-08 LAB
GLUCOSE BLDC GLUCOMTR-MCNC: 115 MG/DL (ref 70–130)
GLUCOSE BLDC GLUCOMTR-MCNC: 170 MG/DL (ref 70–130)

## 2022-04-08 PROCEDURE — 0DTJ0ZZ RESECTION OF APPENDIX, OPEN APPROACH: ICD-10-PCS | Performed by: SPECIALIST

## 2022-04-08 PROCEDURE — 82962 GLUCOSE BLOOD TEST: CPT

## 2022-04-08 PROCEDURE — 25010000002 PROPOFOL 10 MG/ML EMULSION: Performed by: NURSE ANESTHETIST, CERTIFIED REGISTERED

## 2022-04-08 PROCEDURE — 0 HYDROCORTISONE SOD SUCCINATE PF 250 MG RECONSTITUTED SOLUTION: Performed by: NURSE ANESTHETIST, CERTIFIED REGISTERED

## 2022-04-08 PROCEDURE — 25010000002 ERTAPENEM PER 500 MG: Performed by: SPECIALIST

## 2022-04-08 PROCEDURE — 0WBH0ZX EXCISION OF RETROPERITONEUM, OPEN APPROACH, DIAGNOSTIC: ICD-10-PCS | Performed by: SPECIALIST

## 2022-04-08 PROCEDURE — 25010000002 PHENYLEPHRINE 10 MG/ML SOLUTION 1 ML VIAL: Performed by: NURSE ANESTHETIST, CERTIFIED REGISTERED

## 2022-04-08 PROCEDURE — 25010000002 ONDANSETRON PER 1 MG: Performed by: NURSE ANESTHETIST, CERTIFIED REGISTERED

## 2022-04-08 PROCEDURE — C1889 IMPLANT/INSERT DEVICE, NOC: HCPCS | Performed by: SPECIALIST

## 2022-04-08 PROCEDURE — 94640 AIRWAY INHALATION TREATMENT: CPT

## 2022-04-08 PROCEDURE — 94799 UNLISTED PULMONARY SVC/PX: CPT

## 2022-04-08 PROCEDURE — 88309 TISSUE EXAM BY PATHOLOGIST: CPT | Performed by: SPECIALIST

## 2022-04-08 PROCEDURE — 25010000002 MORPHINE (PF) 10 MG/ML SOLUTION: Performed by: SPECIALIST

## 2022-04-08 PROCEDURE — 25010000002 CEFOXITIN PER 1 G: Performed by: SPECIALIST

## 2022-04-08 PROCEDURE — 25010000002 HYDROMORPHONE PER 4 MG: Performed by: ANESTHESIOLOGY

## 2022-04-08 PROCEDURE — 88304 TISSUE EXAM BY PATHOLOGIST: CPT | Performed by: SPECIALIST

## 2022-04-08 PROCEDURE — 0DJD4ZZ INSPECTION OF LOWER INTESTINAL TRACT, PERCUTANEOUS ENDOSCOPIC APPROACH: ICD-10-PCS | Performed by: SPECIALIST

## 2022-04-08 PROCEDURE — 25010000002 MIDAZOLAM PER 1 MG: Performed by: ANESTHESIOLOGY

## 2022-04-08 PROCEDURE — 0DBN0ZZ EXCISION OF SIGMOID COLON, OPEN APPROACH: ICD-10-PCS | Performed by: SPECIALIST

## 2022-04-08 PROCEDURE — 25010000002 KETOROLAC TROMETHAMINE PER 15 MG: Performed by: SPECIALIST

## 2022-04-08 DEVICE — CONTOUR CURVED CUTTER STAPLER
Type: IMPLANTABLE DEVICE | Site: ABDOMEN | Status: FUNCTIONAL
Brand: CONTOUR

## 2022-04-08 DEVICE — PROXIMATE RELOADABLE LINEAR CUTTER WITH SAFETY LOCK-OUT, 75MM
Type: IMPLANTABLE DEVICE | Site: ABDOMEN | Status: FUNCTIONAL
Brand: PROXIMATE

## 2022-04-08 DEVICE — PROXIMATE LINEAR CUTTER RELOAD, BLUE, 75MM
Type: IMPLANTABLE DEVICE | Site: ABDOMEN | Status: FUNCTIONAL
Brand: PROXIMATE

## 2022-04-08 DEVICE — STAPLER WITH DST SERIES TECHNOLOGY
Type: IMPLANTABLE DEVICE | Site: ABDOMEN | Status: FUNCTIONAL
Brand: TA

## 2022-04-08 DEVICE — CONTOUR CURVED CUTTER STAPLER RELOAD
Type: IMPLANTABLE DEVICE | Site: ABDOMEN | Status: FUNCTIONAL
Brand: CONTOUR

## 2022-04-08 RX ORDER — FENTANYL CITRATE 50 UG/ML
25 INJECTION, SOLUTION INTRAMUSCULAR; INTRAVENOUS
Status: DISCONTINUED | OUTPATIENT
Start: 2022-04-08 | End: 2022-04-08 | Stop reason: HOSPADM

## 2022-04-08 RX ORDER — ONDANSETRON 2 MG/ML
4 INJECTION INTRAMUSCULAR; INTRAVENOUS AS NEEDED
Status: DISCONTINUED | OUTPATIENT
Start: 2022-04-08 | End: 2022-04-08 | Stop reason: HOSPADM

## 2022-04-08 RX ORDER — LIDOCAINE HYDROCHLORIDE 10 MG/ML
0.5 INJECTION, SOLUTION EPIDURAL; INFILTRATION; INTRACAUDAL; PERINEURAL ONCE AS NEEDED
Status: DISCONTINUED | OUTPATIENT
Start: 2022-04-08 | End: 2022-04-08 | Stop reason: HOSPADM

## 2022-04-08 RX ORDER — KETOROLAC TROMETHAMINE 30 MG/ML
30 INJECTION, SOLUTION INTRAMUSCULAR; INTRAVENOUS EVERY 6 HOURS
Status: COMPLETED | OUTPATIENT
Start: 2022-04-08 | End: 2022-04-13

## 2022-04-08 RX ORDER — DROPERIDOL 2.5 MG/ML
0.62 INJECTION, SOLUTION INTRAMUSCULAR; INTRAVENOUS ONCE AS NEEDED
Status: DISCONTINUED | OUTPATIENT
Start: 2022-04-08 | End: 2022-04-08 | Stop reason: HOSPADM

## 2022-04-08 RX ORDER — ONDANSETRON 2 MG/ML
4 INJECTION INTRAMUSCULAR; INTRAVENOUS EVERY 4 HOURS PRN
Status: DISCONTINUED | OUTPATIENT
Start: 2022-04-08 | End: 2022-04-15 | Stop reason: HOSPADM

## 2022-04-08 RX ORDER — MIDAZOLAM HYDROCHLORIDE 1 MG/ML
1 INJECTION INTRAMUSCULAR; INTRAVENOUS
Status: COMPLETED | OUTPATIENT
Start: 2022-04-08 | End: 2022-04-08

## 2022-04-08 RX ORDER — SODIUM CHLORIDE 9 MG/ML
INJECTION, SOLUTION INTRAVENOUS AS NEEDED
Status: DISCONTINUED | OUTPATIENT
Start: 2022-04-08 | End: 2022-04-08 | Stop reason: HOSPADM

## 2022-04-08 RX ORDER — SODIUM CHLORIDE 0.9 % (FLUSH) 0.9 %
3 SYRINGE (ML) INJECTION AS NEEDED
Status: DISCONTINUED | OUTPATIENT
Start: 2022-04-08 | End: 2022-04-08 | Stop reason: HOSPADM

## 2022-04-08 RX ORDER — FAMOTIDINE 10 MG/ML
20 INJECTION, SOLUTION INTRAVENOUS EVERY 12 HOURS SCHEDULED
Status: DISCONTINUED | OUTPATIENT
Start: 2022-04-08 | End: 2022-04-15

## 2022-04-08 RX ORDER — OXYCODONE AND ACETAMINOPHEN 10; 325 MG/1; MG/1
1 TABLET ORAL ONCE AS NEEDED
Status: DISCONTINUED | OUTPATIENT
Start: 2022-04-08 | End: 2022-04-08 | Stop reason: HOSPADM

## 2022-04-08 RX ORDER — SODIUM CHLORIDE, SODIUM LACTATE, POTASSIUM CHLORIDE, CALCIUM CHLORIDE 600; 310; 30; 20 MG/100ML; MG/100ML; MG/100ML; MG/100ML
100 INJECTION, SOLUTION INTRAVENOUS CONTINUOUS
Status: DISCONTINUED | OUTPATIENT
Start: 2022-04-08 | End: 2022-04-08

## 2022-04-08 RX ORDER — SODIUM CHLORIDE, SODIUM LACTATE, POTASSIUM CHLORIDE, CALCIUM CHLORIDE 600; 310; 30; 20 MG/100ML; MG/100ML; MG/100ML; MG/100ML
1000 INJECTION, SOLUTION INTRAVENOUS CONTINUOUS
Status: DISCONTINUED | OUTPATIENT
Start: 2022-04-08 | End: 2022-04-09

## 2022-04-08 RX ORDER — PROPOFOL 10 MG/ML
VIAL (ML) INTRAVENOUS AS NEEDED
Status: DISCONTINUED | OUTPATIENT
Start: 2022-04-08 | End: 2022-04-08 | Stop reason: SURG

## 2022-04-08 RX ORDER — SODIUM CHLORIDE 0.9 % (FLUSH) 0.9 %
10 SYRINGE (ML) INJECTION AS NEEDED
Status: DISCONTINUED | OUTPATIENT
Start: 2022-04-08 | End: 2022-04-08 | Stop reason: HOSPADM

## 2022-04-08 RX ORDER — PHENYLEPHRINE HCL IN 0.9% NACL 1 MG/10 ML
SYRINGE (ML) INTRAVENOUS AS NEEDED
Status: DISCONTINUED | OUTPATIENT
Start: 2022-04-08 | End: 2022-04-08 | Stop reason: SURG

## 2022-04-08 RX ORDER — VECURONIUM BROMIDE 1 MG/ML
INJECTION, POWDER, LYOPHILIZED, FOR SOLUTION INTRAVENOUS AS NEEDED
Status: DISCONTINUED | OUTPATIENT
Start: 2022-04-08 | End: 2022-04-08 | Stop reason: SURG

## 2022-04-08 RX ORDER — LABETALOL HYDROCHLORIDE 5 MG/ML
5 INJECTION, SOLUTION INTRAVENOUS
Status: DISCONTINUED | OUTPATIENT
Start: 2022-04-08 | End: 2022-04-08 | Stop reason: HOSPADM

## 2022-04-08 RX ORDER — HYDROMORPHONE HYDROCHLORIDE 1 MG/ML
0.5 INJECTION, SOLUTION INTRAMUSCULAR; INTRAVENOUS; SUBCUTANEOUS
Status: DISCONTINUED | OUTPATIENT
Start: 2022-04-08 | End: 2022-04-08 | Stop reason: HOSPADM

## 2022-04-08 RX ORDER — ACETAMINOPHEN 500 MG
1000 TABLET ORAL ONCE
Status: COMPLETED | OUTPATIENT
Start: 2022-04-08 | End: 2022-04-08

## 2022-04-08 RX ORDER — SUFENTANIL CITRATE 50 UG/ML
INJECTION EPIDURAL; INTRAVENOUS AS NEEDED
Status: DISCONTINUED | OUTPATIENT
Start: 2022-04-08 | End: 2022-04-08 | Stop reason: SURG

## 2022-04-08 RX ORDER — NALOXONE HCL 0.4 MG/ML
0.04 VIAL (ML) INJECTION AS NEEDED
Status: DISCONTINUED | OUTPATIENT
Start: 2022-04-08 | End: 2022-04-08 | Stop reason: HOSPADM

## 2022-04-08 RX ORDER — SODIUM CHLORIDE 0.9 % (FLUSH) 0.9 %
3 SYRINGE (ML) INJECTION EVERY 12 HOURS SCHEDULED
Status: DISCONTINUED | OUTPATIENT
Start: 2022-04-08 | End: 2022-04-08 | Stop reason: HOSPADM

## 2022-04-08 RX ORDER — SODIUM CHLORIDE, SODIUM LACTATE, POTASSIUM CHLORIDE, CALCIUM CHLORIDE 600; 310; 30; 20 MG/100ML; MG/100ML; MG/100ML; MG/100ML
75 INJECTION, SOLUTION INTRAVENOUS CONTINUOUS
Status: DISCONTINUED | OUTPATIENT
Start: 2022-04-08 | End: 2022-04-13

## 2022-04-08 RX ORDER — ALBUTEROL SULFATE 90 UG/1
AEROSOL, METERED RESPIRATORY (INHALATION) AS NEEDED
Status: DISCONTINUED | OUTPATIENT
Start: 2022-04-08 | End: 2022-04-08 | Stop reason: SURG

## 2022-04-08 RX ORDER — ONDANSETRON 2 MG/ML
INJECTION INTRAMUSCULAR; INTRAVENOUS AS NEEDED
Status: DISCONTINUED | OUTPATIENT
Start: 2022-04-08 | End: 2022-04-08 | Stop reason: SURG

## 2022-04-08 RX ORDER — NICARDIPINE HYDROCHLORIDE 2.5 MG/ML
INJECTION INTRAVENOUS AS NEEDED
Status: DISCONTINUED | OUTPATIENT
Start: 2022-04-08 | End: 2022-04-08 | Stop reason: SURG

## 2022-04-08 RX ORDER — MORPHINE SULFATE 1 MG/ML
INJECTION INTRAVENOUS CONTINUOUS
Status: DISCONTINUED | OUTPATIENT
Start: 2022-04-08 | End: 2022-04-10

## 2022-04-08 RX ORDER — METOPROLOL TARTRATE 5 MG/5ML
5 INJECTION INTRAVENOUS EVERY 6 HOURS
Status: DISCONTINUED | OUTPATIENT
Start: 2022-04-08 | End: 2022-04-15 | Stop reason: HOSPADM

## 2022-04-08 RX ORDER — FLUMAZENIL 0.1 MG/ML
0.2 INJECTION INTRAVENOUS AS NEEDED
Status: DISCONTINUED | OUTPATIENT
Start: 2022-04-08 | End: 2022-04-08 | Stop reason: HOSPADM

## 2022-04-08 RX ORDER — LIDOCAINE HYDROCHLORIDE 20 MG/ML
INJECTION, SOLUTION EPIDURAL; INFILTRATION; INTRACAUDAL; PERINEURAL AS NEEDED
Status: DISCONTINUED | OUTPATIENT
Start: 2022-04-08 | End: 2022-04-08 | Stop reason: SURG

## 2022-04-08 RX ORDER — NALOXONE HCL 0.4 MG/ML
0.1 VIAL (ML) INJECTION
Status: DISCONTINUED | OUTPATIENT
Start: 2022-04-08 | End: 2022-04-15 | Stop reason: HOSPADM

## 2022-04-08 RX ORDER — ETANERCEPT 25 MG/.5ML
2.5 SOLUTION SUBCUTANEOUS WEEKLY
COMMUNITY
End: 2022-04-15 | Stop reason: HOSPADM

## 2022-04-08 RX ORDER — SODIUM CHLORIDE 0.9 % (FLUSH) 0.9 %
3-10 SYRINGE (ML) INJECTION AS NEEDED
Status: DISCONTINUED | OUTPATIENT
Start: 2022-04-08 | End: 2022-04-08 | Stop reason: HOSPADM

## 2022-04-08 RX ORDER — IPRATROPIUM BROMIDE AND ALBUTEROL SULFATE 2.5; .5 MG/3ML; MG/3ML
3 SOLUTION RESPIRATORY (INHALATION)
Status: DISCONTINUED | OUTPATIENT
Start: 2022-04-08 | End: 2022-04-15 | Stop reason: HOSPADM

## 2022-04-08 RX ORDER — ROCURONIUM BROMIDE 10 MG/ML
INJECTION, SOLUTION INTRAVENOUS AS NEEDED
Status: DISCONTINUED | OUTPATIENT
Start: 2022-04-08 | End: 2022-04-08 | Stop reason: SURG

## 2022-04-08 RX ORDER — ULTRASOUND COUPLING MEDIUM
GEL (GRAM) TOPICAL AS NEEDED
Status: DISCONTINUED | OUTPATIENT
Start: 2022-04-08 | End: 2022-04-08 | Stop reason: HOSPADM

## 2022-04-08 RX ADMIN — VECURONIUM BROMIDE 5 MG: 1 INJECTION, POWDER, LYOPHILIZED, FOR SOLUTION INTRAVENOUS at 10:45

## 2022-04-08 RX ADMIN — FAMOTIDINE 20 MG: 10 INJECTION INTRAVENOUS at 21:43

## 2022-04-08 RX ADMIN — SUFENTANIL CITRATE 20 MCG: 50 INJECTION EPIDURAL; INTRAVENOUS at 09:42

## 2022-04-08 RX ADMIN — NICARDIPINE HYDROCHLORIDE 500 MCG: 25 INJECTION INTRAVENOUS at 09:54

## 2022-04-08 RX ADMIN — SUFENTANIL CITRATE 20 MCG: 50 INJECTION EPIDURAL; INTRAVENOUS at 08:30

## 2022-04-08 RX ADMIN — ONDANSETRON 4 MG: 2 INJECTION INTRAMUSCULAR; INTRAVENOUS at 11:12

## 2022-04-08 RX ADMIN — PROPOFOL 100 MG: 10 INJECTION, EMULSION INTRAVENOUS at 08:36

## 2022-04-08 RX ADMIN — VECURONIUM BROMIDE 5 MG: 1 INJECTION, POWDER, LYOPHILIZED, FOR SOLUTION INTRAVENOUS at 09:49

## 2022-04-08 RX ADMIN — NICARDIPINE HYDROCHLORIDE 500 MCG: 25 INJECTION INTRAVENOUS at 09:50

## 2022-04-08 RX ADMIN — HYDROCORTISONE SODIUM SUCCINATE 100 MG: 250 INJECTION, POWDER, FOR SOLUTION INTRAMUSCULAR; INTRAVENOUS at 08:31

## 2022-04-08 RX ADMIN — IPRATROPIUM BROMIDE AND ALBUTEROL SULFATE 3 ML: .5; 3 SOLUTION RESPIRATORY (INHALATION) at 19:15

## 2022-04-08 RX ADMIN — ROCURONIUM BROMIDE 50 MG: 10 SOLUTION INTRAVENOUS at 08:38

## 2022-04-08 RX ADMIN — ACETAMINOPHEN 1000 MG: 500 TABLET, FILM COATED ORAL at 07:38

## 2022-04-08 RX ADMIN — SUGAMMADEX 200 MG: 100 INJECTION, SOLUTION INTRAVENOUS at 11:15

## 2022-04-08 RX ADMIN — SODIUM CHLORIDE, POTASSIUM CHLORIDE, SODIUM LACTATE AND CALCIUM CHLORIDE: 600; 310; 30; 20 INJECTION, SOLUTION INTRAVENOUS at 09:31

## 2022-04-08 RX ADMIN — SODIUM CHLORIDE, POTASSIUM CHLORIDE, SODIUM LACTATE AND CALCIUM CHLORIDE 1000 ML: 600; 310; 30; 20 INJECTION, SOLUTION INTRAVENOUS at 06:23

## 2022-04-08 RX ADMIN — SODIUM CHLORIDE, POTASSIUM CHLORIDE, SODIUM LACTATE AND CALCIUM CHLORIDE 125 ML/HR: 600; 310; 30; 20 INJECTION, SOLUTION INTRAVENOUS at 19:07

## 2022-04-08 RX ADMIN — HYDROMORPHONE HYDROCHLORIDE 0.5 MG: 1 INJECTION, SOLUTION INTRAMUSCULAR; INTRAVENOUS; SUBCUTANEOUS at 11:47

## 2022-04-08 RX ADMIN — MIDAZOLAM 1 MG: 1 INJECTION INTRAMUSCULAR; INTRAVENOUS at 07:38

## 2022-04-08 RX ADMIN — METOPROLOL TARTRATE 5 MG: 1 INJECTION, SOLUTION INTRAVENOUS at 21:53

## 2022-04-08 RX ADMIN — SUGAMMADEX 200 MG: 100 INJECTION, SOLUTION INTRAVENOUS at 11:09

## 2022-04-08 RX ADMIN — VECURONIUM BROMIDE 5 MG: 1 INJECTION, POWDER, LYOPHILIZED, FOR SOLUTION INTRAVENOUS at 09:30

## 2022-04-08 RX ADMIN — NICARDIPINE HYDROCHLORIDE 1000 MCG: 25 INJECTION INTRAVENOUS at 09:58

## 2022-04-08 RX ADMIN — VASOPRESSIN 1.5 ML: 20 INJECTION INTRAVENOUS at 10:21

## 2022-04-08 RX ADMIN — Medication 150 MCG: at 10:14

## 2022-04-08 RX ADMIN — SUGAMMADEX 200 MG: 100 INJECTION, SOLUTION INTRAVENOUS at 11:11

## 2022-04-08 RX ADMIN — Medication 200 MCG: at 09:12

## 2022-04-08 RX ADMIN — LIDOCAINE HYDROCHLORIDE 100 MG: 20 INJECTION, SOLUTION EPIDURAL; INFILTRATION; INTRACAUDAL; PERINEURAL at 08:36

## 2022-04-08 RX ADMIN — ALBUTEROL SULFATE 4 PUFF: 90 AEROSOL, METERED RESPIRATORY (INHALATION) at 09:51

## 2022-04-08 RX ADMIN — SUFENTANIL CITRATE 20 MCG: 50 INJECTION EPIDURAL; INTRAVENOUS at 08:36

## 2022-04-08 RX ADMIN — VASOPRESSIN 0.5 ML: 20 INJECTION INTRAVENOUS at 10:18

## 2022-04-08 RX ADMIN — SODIUM CHLORIDE 1 G: 9 INJECTION, SOLUTION INTRAVENOUS at 08:50

## 2022-04-08 RX ADMIN — MORPHINE SULFATE: 10 INJECTION, SOLUTION INTRAMUSCULAR; INTRAVENOUS at 15:20

## 2022-04-08 RX ADMIN — VASOPRESSIN 1 ML: 20 INJECTION INTRAVENOUS at 10:31

## 2022-04-08 RX ADMIN — Medication 150 MCG: at 10:16

## 2022-04-08 RX ADMIN — PHENYLEPHRINE HYDROCHLORIDE 0.5 MCG/KG/MIN: 10 INJECTION INTRAVENOUS at 10:37

## 2022-04-08 RX ADMIN — SUFENTANIL CITRATE 10 MCG: 50 INJECTION EPIDURAL; INTRAVENOUS at 09:33

## 2022-04-08 RX ADMIN — MIDAZOLAM 1 MG: 1 INJECTION INTRAMUSCULAR; INTRAVENOUS at 08:05

## 2022-04-08 RX ADMIN — Medication 400 MCG: at 09:08

## 2022-04-08 RX ADMIN — CEFOXITIN 2 G: 2 INJECTION, POWDER, FOR SOLUTION INTRAVENOUS at 21:43

## 2022-04-08 RX ADMIN — Medication 200 MCG: at 09:18

## 2022-04-08 RX ADMIN — SUGAMMADEX 200 MG: 100 INJECTION, SOLUTION INTRAVENOUS at 11:23

## 2022-04-08 RX ADMIN — IPRATROPIUM BROMIDE AND ALBUTEROL SULFATE 3 ML: .5; 3 SOLUTION RESPIRATORY (INHALATION) at 15:01

## 2022-04-08 RX ADMIN — KETOROLAC TROMETHAMINE 30 MG: 30 INJECTION, SOLUTION INTRAMUSCULAR; INTRAVENOUS at 21:43

## 2022-04-08 RX ADMIN — SUFENTANIL CITRATE 20 MCG: 50 INJECTION EPIDURAL; INTRAVENOUS at 09:40

## 2022-04-08 RX ADMIN — KETOROLAC TROMETHAMINE 30 MG: 30 INJECTION, SOLUTION INTRAMUSCULAR; INTRAVENOUS at 15:17

## 2022-04-08 RX ADMIN — SUFENTANIL CITRATE 10 MCG: 50 INJECTION EPIDURAL; INTRAVENOUS at 09:35

## 2022-04-08 RX ADMIN — VASOPRESSIN 1 ML: 20 INJECTION INTRAVENOUS at 10:23

## 2022-04-08 NOTE — PLAN OF CARE
Goal Outcome Evaluation:  Plan of Care Reviewed With: patient        Progress: no change  Outcome Evaluation: ivf/ ms pca cont. npo. tapia to bsd with yellow urine. scd's in progress. abd with midline incd, gauze & tegaderm secure, small amount serosang drainage noted. no acute distress noted. cont to monitor.

## 2022-04-08 NOTE — ANESTHESIA POSTPROCEDURE EVALUATION
Patient: Alicia Macedo    Procedure Summary     Date: 04/08/22 Room / Location:  [facility]  /  [facility]    Anesthesia Start: 0830 Anesthesia Stop: 1134    Procedure: HAND ASSISTED LAPAROSCOPIC SIGMOID COLECTOMY (N/A Abdomen) Diagnosis: (COLON CANCER)    Surgeons: Noreen Aragon MD Provider: Mustapha Schafer CRNA    Anesthesia Type: general ASA Status: 3          Anesthesia Type: general    Vitals  Vitals Value Taken Time   /68 04/08/22 1258   Temp 97.6 °F (36.4 °C) 04/08/22 1132   Pulse 97 04/08/22 1302   Resp 12 04/08/22 1245   SpO2 90 % 04/08/22 1302   Vitals shown include unvalidated device data.        Post Anesthesia Care and Evaluation    Patient location during evaluation: PACU  Patient participation: complete - patient participated  Level of consciousness: awake and alert  Pain management: adequate  Airway patency: patent  Anesthetic complications: No anesthetic complications    Cardiovascular status: acceptable  Respiratory status: acceptable  Hydration status: acceptable    Comments: Blood pressure 124/68, pulse 86, temperature 97.6 °F (36.4 °C), temperature source Temporal, resp. rate 12, SpO2 93 %.    Pt discharged from PACU based on rekha score >8

## 2022-04-08 NOTE — ANESTHESIA PREPROCEDURE EVALUATION
Anesthesia Evaluation     no history of anesthetic complications:  NPO Solid Status: > 8 hours  NPO Liquid Status: > 8 hours           Airway   Mallampati: IV  TM distance: <3 FB  Neck ROM: limited  Difficult intubation highly probable  Dental      Pulmonary    (+) sleep apnea on CPAP,   (-) not a smoker  Cardiovascular   Exercise tolerance: good (4-7 METS)    Patient on routine beta blocker and Beta blocker given within 24 hours of surgery    (+) hypertension, hyperlipidemia,   (-) CAD      Neuro/Psych  (-) seizures, TIA, CVA  GI/Hepatic/Renal/Endo    (+) morbid obesity, GERD,  renal disease stones, diabetes mellitus type 2,     Musculoskeletal     Abdominal    Substance History      OB/GYN          Other   arthritis, blood dyscrasia anemia,   history of cancer (colon cancer) active      Other Comment: Ankylosing spondylitis off enbrel for now due to colon cancer, taking prednisone 5 mg daily                  Anesthesia Plan    ASA 3     general   (I anticipate difficult airway, have cmac and fiberoptic in room. )  intravenous induction     Anesthetic plan, all risks, benefits, and alternatives have been provided, discussed and informed consent has been obtained with: patient.        CODE STATUS:

## 2022-04-08 NOTE — ANESTHESIA PROCEDURE NOTES
Airway  Urgency: elective    Date/Time: 4/8/2022 8:41 AM  Difficult airway    General Information and Staff    Patient location during procedure: OR  CRNA: Mustapha Schafer CRNA    Indications and Patient Condition  Indications for airway management: airway protection    Preoxygenated: yes  Mask difficulty assessment: 3 - difficult mask (inadequate, unstable or two providers) +/- NMBA (oral airway with two provider masking, acheived good TV)    Final Airway Details  Final airway type: endotracheal airway      Successful airway: ETT  Cuffed: yes   Successful intubation technique: video laryngoscopy  Facilitating devices/methods: intubating stylet  Endotracheal tube insertion site: oral  Blade: CMAC  Blade size: D  ETT size (mm): 7.5  Cormack-Lehane Classification: grade I - full view of glottis  Placement verified by: chest auscultation and capnometry   Cuff volume (mL): 10  Measured from: lips  ETT/EBT  to lips (cm): 22  Number of attempts at approach: 1  Assessment: lips, teeth, and gum same as pre-op and atraumatic intubation    Additional Comments  Difficult airway. Very limited mouth opening with a large tongue and medium to large sized teeth. No ROM with neck. Patient's True vocal chords were not appreciable until the ETT passed through the glottic opening, prior to that it resembled what the esophagus would normally look like. Intubation was controlled and patient was induced slowly to ensure the ability to mask ventilate the patient. No complications occurred.

## 2022-04-08 NOTE — OP NOTE
Preoperative diagnosis:  Sigmoid colon cancer  Postoperative diagnosis:  Same  Procedure:  Hand-assisted converted to open laparoscopic sigmoidectomy, appendectomy, resection abdominal cavity nodule  Surgeon: Noreen Aragon MD  Anesthesia:  Get  Ebl:  150cc  Ivf:  See anesthesia  Indications: the patient is a 46-year-old male who has been diagnosed with sigmoid adenocarcinoma.  He presents for partial colectomy.  The risks, benefits, complications, and possible alternatives were discussed with the patient who agreed to proceed.  Description of procedure:  The patient was laid in modified lithotomy in stirrups.  The abdomen was prepped and draped.  The abdomen was entered with veress.  A 5mm trocar was placed in the supraumbiilcal region.  A midline incision was created at the midline infraumbilical for placement of the gelport.  The mass was palpable in the left lower quadrant.  Visualization was extremely difficult as the patient has significant redundancy of the entire colon.  The mass was adherent to the posterior wall just inferior to the cecum.  Tracing the colon proximally and distally was also difficult due to the redundance that I couldn't tell which way was headed proximally or distally.  Because of the visualization difficulty, the gelport was removed and the midline incision was enlarged.  It was noted that the mass was just inferior to the cecum adherent to the posterior abdominal wall.  Blunt dissection was used to free the mass.  Doing so, it was noted that the distal colon was dilated and redundant.  The proximal bowel was tethered to the root of the mesentery.  It traveled transversely and at the junction of the sigmoid and descending, it was tightly tethered to the posterolateral wall.  Using multiple hands for retraction, the lateral peritoneal reflection was opened at the proximal sigmoid and descending colon to just distal to the splenic flexure.  Blunt dissection was used to free and elevate the  descending and proximal sigmoid colons.  A curved contour TA45 blue stapler was used to transect the sigmoid proximal and distal to the mass.  The mesentery was transected using Enseal.  A silk stitch was placed at the proximal staple line of the specimen.  A side to side, functional end to end stapled colocolonic anastomosis was created using KAUSHAL 75 blue stapler and TA 90 blue stapler.  The antimesenteric walls were connected with 2-0 silk.  The staple line was buried using 2-0 silk in interrupted Lembert fashion.  The mesenteric defect was closed with 2-0 silk.  A 1cm round nodule was seen near the mesentery of the transverse colon. It was likely just adipose tissue, but it was sent to pathology as well. The peritoneal cavity was copiously irrigated with sterile saline with Mefoxin.  The abdominal wall was closed with #1 looped PDS x 2.  The skin was closed with staples.  Dry dressings were applied.  The sponge, needle, and instrument counts were correct.  Complications: none  Disposition good to pacu  Findings:  Very thin-walled abdominal cavity with very redundant colon; normal-appearing appendix, mass at mid sigmoid. The mass was adherent to the posterior abdominal wall at the right lower quadrant.  No peritoneal seeding.   EXAGGERATED

## 2022-04-09 LAB
ANION GAP SERPL CALCULATED.3IONS-SCNC: 9 MMOL/L (ref 5–15)
BUN SERPL-MCNC: 22 MG/DL (ref 6–20)
BUN/CREAT SERPL: 14.8 (ref 7–25)
CALCIUM SPEC-SCNC: 9 MG/DL (ref 8.6–10.5)
CHLORIDE SERPL-SCNC: 99 MMOL/L (ref 98–107)
CO2 SERPL-SCNC: 29 MMOL/L (ref 22–29)
CREAT SERPL-MCNC: 1.49 MG/DL (ref 0.76–1.27)
DEPRECATED RDW RBC AUTO: 47.6 FL (ref 37–54)
EGFRCR SERPLBLD CKD-EPI 2021: 57.5 ML/MIN/1.73
ERYTHROCYTE [DISTWIDTH] IN BLOOD BY AUTOMATED COUNT: 17.1 % (ref 12.3–15.4)
GLUCOSE SERPL-MCNC: 112 MG/DL (ref 65–99)
HCT VFR BLD AUTO: 32.2 % (ref 37.5–51)
HGB BLD-MCNC: 9.1 G/DL (ref 13–17.7)
MAGNESIUM SERPL-MCNC: 2.5 MG/DL (ref 1.6–2.6)
MCH RBC QN AUTO: 21.7 PG (ref 26.6–33)
MCHC RBC AUTO-ENTMCNC: 28.3 G/DL (ref 31.5–35.7)
MCV RBC AUTO: 76.8 FL (ref 79–97)
PHOSPHATE SERPL-MCNC: 5.1 MG/DL (ref 2.5–4.5)
PLATELET # BLD AUTO: 397 10*3/MM3 (ref 140–450)
PMV BLD AUTO: 10.8 FL (ref 6–12)
POTASSIUM SERPL-SCNC: 4.6 MMOL/L (ref 3.5–5.2)
RBC # BLD AUTO: 4.19 10*6/MM3 (ref 4.14–5.8)
SODIUM SERPL-SCNC: 137 MMOL/L (ref 136–145)
WBC NRBC COR # BLD: 9.6 10*3/MM3 (ref 3.4–10.8)

## 2022-04-09 PROCEDURE — 25010000002 KETOROLAC TROMETHAMINE PER 15 MG: Performed by: SPECIALIST

## 2022-04-09 PROCEDURE — 80048 BASIC METABOLIC PNL TOTAL CA: CPT | Performed by: SPECIALIST

## 2022-04-09 PROCEDURE — 94664 DEMO&/EVAL PT USE INHALER: CPT

## 2022-04-09 PROCEDURE — 83735 ASSAY OF MAGNESIUM: CPT | Performed by: SPECIALIST

## 2022-04-09 PROCEDURE — 25010000002 ENOXAPARIN PER 10 MG: Performed by: SPECIALIST

## 2022-04-09 PROCEDURE — 94799 UNLISTED PULMONARY SVC/PX: CPT

## 2022-04-09 PROCEDURE — 85027 COMPLETE CBC AUTOMATED: CPT | Performed by: SPECIALIST

## 2022-04-09 PROCEDURE — 25010000002 CEFOXITIN PER 1 G: Performed by: SPECIALIST

## 2022-04-09 PROCEDURE — 84100 ASSAY OF PHOSPHORUS: CPT | Performed by: SPECIALIST

## 2022-04-09 RX ADMIN — IPRATROPIUM BROMIDE AND ALBUTEROL SULFATE 3 ML: .5; 3 SOLUTION RESPIRATORY (INHALATION) at 14:40

## 2022-04-09 RX ADMIN — KETOROLAC TROMETHAMINE 30 MG: 30 INJECTION, SOLUTION INTRAMUSCULAR; INTRAVENOUS at 04:25

## 2022-04-09 RX ADMIN — KETOROLAC TROMETHAMINE 30 MG: 30 INJECTION, SOLUTION INTRAMUSCULAR; INTRAVENOUS at 16:11

## 2022-04-09 RX ADMIN — CEFOXITIN 2 G: 2 INJECTION, POWDER, FOR SOLUTION INTRAVENOUS at 04:25

## 2022-04-09 RX ADMIN — SODIUM CHLORIDE, POTASSIUM CHLORIDE, SODIUM LACTATE AND CALCIUM CHLORIDE 125 ML/HR: 600; 310; 30; 20 INJECTION, SOLUTION INTRAVENOUS at 04:20

## 2022-04-09 RX ADMIN — KETOROLAC TROMETHAMINE 30 MG: 30 INJECTION, SOLUTION INTRAMUSCULAR; INTRAVENOUS at 09:42

## 2022-04-09 RX ADMIN — IPRATROPIUM BROMIDE AND ALBUTEROL SULFATE 3 ML: .5; 3 SOLUTION RESPIRATORY (INHALATION) at 06:39

## 2022-04-09 RX ADMIN — FAMOTIDINE 20 MG: 10 INJECTION INTRAVENOUS at 09:42

## 2022-04-09 RX ADMIN — FAMOTIDINE 20 MG: 10 INJECTION INTRAVENOUS at 21:06

## 2022-04-09 RX ADMIN — SODIUM CHLORIDE, POTASSIUM CHLORIDE, SODIUM LACTATE AND CALCIUM CHLORIDE 125 ML/HR: 600; 310; 30; 20 INJECTION, SOLUTION INTRAVENOUS at 20:18

## 2022-04-09 RX ADMIN — ENOXAPARIN SODIUM 40 MG: 100 INJECTION SUBCUTANEOUS at 09:42

## 2022-04-09 RX ADMIN — IPRATROPIUM BROMIDE AND ALBUTEROL SULFATE 3 ML: .5; 3 SOLUTION RESPIRATORY (INHALATION) at 19:40

## 2022-04-09 RX ADMIN — KETOROLAC TROMETHAMINE 30 MG: 30 INJECTION, SOLUTION INTRAMUSCULAR; INTRAVENOUS at 21:06

## 2022-04-09 NOTE — PROGRESS NOTES
Noreen Aragon MD FACS  Progress Note     LOS: 1 day   Patient Care Team:  Cristian Burciaga MD as PCP - General (Sports Medicine)      Subjective     Interval History:      no events over pm.  Pain controlled     Objective     Vital Signs  Temp:  [96.6 °F (35.9 °C)-98.5 °F (36.9 °C)] 98.5 °F (36.9 °C)  Heart Rate:  [81-97] 90  Resp:  [12-19] 18  BP: ()/(59-92) 94/63    Physical Exam:  General appearance - alert, well appearing, and in no distress  Abdomen - soft, appropriately tender      Results Review:    Lab Results (last 24 hours)     Procedure Component Value Units Date/Time    CBC (No Diff) [[id number]]  (Abnormal) Collected: 04/09/22 0650    Specimen: Blood Updated: 04/09/22 0801     WBC 9.60 10*3/mm3      RBC 4.19 10*6/mm3      Hemoglobin 9.1 g/dL      Hematocrit 32.2 %      MCV 76.8 fL      MCH 21.7 pg      MCHC 28.3 g/dL      RDW 17.1 %      RDW-SD 47.6 fl      MPV 10.8 fL      Platelets 397 10*3/mm3     Basic Metabolic Panel [[id number]] Collected: 04/09/22 0650    Specimen: Blood Updated: 04/09/22 0757    Magnesium [[id number]] Collected: 04/09/22 0650    Specimen: Blood Updated: 04/09/22 0757    Phosphorus [[id number]] Collected: 04/09/22 0650    Specimen: Blood Updated: 04/09/22 0757    Tissue Pathology Exam [[id number]] Collected: 04/08/22 0953    Specimen: Tissue from Large Intestine, Sigmoid Colon Updated: 04/08/22 1319    POC Glucose Once [[id number]]  (Abnormal) Collected: 04/08/22 1136    Specimen: Blood Updated: 04/08/22 1147     Glucose 170 mg/dL      Comment: : [id number] Lax Ai  SMeter ID: [id number]           Imaging Results (Last 24 Hours)     ** No results found for the last 24 hours. **            Assessment/Plan       POD#1 sigmoidectomy    Clears as tolerated      Noreen Aragon MD  04/09/22  08:05 CDT

## 2022-04-09 NOTE — PLAN OF CARE
"Goal Outcome Evaluation:  Plan of Care Reviewed With: patient        Progress: no change  Outcome Evaluation: Pt appeared to sleep most of the night. VSS. Morphine PCA continues with scheduled Toradol pt reports \"tolerable\" pain levels. F/C intact draining clear yellow urine. O2 weaned to 6L with home c-pap machine. Midline drsg intact. SCD's in place. Will continue to monitor.  "

## 2022-04-10 LAB
ANION GAP SERPL CALCULATED.3IONS-SCNC: 11 MMOL/L (ref 5–15)
BUN SERPL-MCNC: 19 MG/DL (ref 6–20)
BUN/CREAT SERPL: 15.4 (ref 7–25)
CALCIUM SPEC-SCNC: 8.8 MG/DL (ref 8.6–10.5)
CHLORIDE SERPL-SCNC: 99 MMOL/L (ref 98–107)
CO2 SERPL-SCNC: 27 MMOL/L (ref 22–29)
CREAT SERPL-MCNC: 1.23 MG/DL (ref 0.76–1.27)
DEPRECATED RDW RBC AUTO: 47.8 FL (ref 37–54)
EGFRCR SERPLBLD CKD-EPI 2021: 72.4 ML/MIN/1.73
ERYTHROCYTE [DISTWIDTH] IN BLOOD BY AUTOMATED COUNT: 17 % (ref 12.3–15.4)
GLUCOSE SERPL-MCNC: 93 MG/DL (ref 65–99)
HCT VFR BLD AUTO: 28.7 % (ref 37.5–51)
HGB BLD-MCNC: 8.1 G/DL (ref 13–17.7)
MCH RBC QN AUTO: 21.7 PG (ref 26.6–33)
MCHC RBC AUTO-ENTMCNC: 28.2 G/DL (ref 31.5–35.7)
MCV RBC AUTO: 76.7 FL (ref 79–97)
PLATELET # BLD AUTO: 340 10*3/MM3 (ref 140–450)
PMV BLD AUTO: 10.8 FL (ref 6–12)
POTASSIUM SERPL-SCNC: 4.3 MMOL/L (ref 3.5–5.2)
RBC # BLD AUTO: 3.74 10*6/MM3 (ref 4.14–5.8)
SODIUM SERPL-SCNC: 137 MMOL/L (ref 136–145)
WBC NRBC COR # BLD: 8.35 10*3/MM3 (ref 3.4–10.8)

## 2022-04-10 PROCEDURE — 94664 DEMO&/EVAL PT USE INHALER: CPT

## 2022-04-10 PROCEDURE — 85027 COMPLETE CBC AUTOMATED: CPT | Performed by: SPECIALIST

## 2022-04-10 PROCEDURE — 94799 UNLISTED PULMONARY SVC/PX: CPT

## 2022-04-10 PROCEDURE — 25010000002 MORPHINE (PF) 10 MG/ML SOLUTION: Performed by: SPECIALIST

## 2022-04-10 PROCEDURE — 25010000002 ENOXAPARIN PER 10 MG: Performed by: SPECIALIST

## 2022-04-10 PROCEDURE — 25010000002 KETOROLAC TROMETHAMINE PER 15 MG: Performed by: SPECIALIST

## 2022-04-10 PROCEDURE — 80048 BASIC METABOLIC PNL TOTAL CA: CPT | Performed by: SPECIALIST

## 2022-04-10 RX ORDER — MORPHINE SULFATE 1 MG/ML
INJECTION INTRAVENOUS CONTINUOUS
Status: DISCONTINUED | OUTPATIENT
Start: 2022-04-10 | End: 2022-04-12 | Stop reason: SDUPTHER

## 2022-04-10 RX ADMIN — IPRATROPIUM BROMIDE AND ALBUTEROL SULFATE 3 ML: .5; 3 SOLUTION RESPIRATORY (INHALATION) at 19:17

## 2022-04-10 RX ADMIN — FAMOTIDINE 20 MG: 10 INJECTION INTRAVENOUS at 20:19

## 2022-04-10 RX ADMIN — METOPROLOL TARTRATE 5 MG: 1 INJECTION, SOLUTION INTRAVENOUS at 16:16

## 2022-04-10 RX ADMIN — IPRATROPIUM BROMIDE AND ALBUTEROL SULFATE 3 ML: .5; 3 SOLUTION RESPIRATORY (INHALATION) at 14:04

## 2022-04-10 RX ADMIN — IPRATROPIUM BROMIDE AND ALBUTEROL SULFATE 3 ML: .5; 3 SOLUTION RESPIRATORY (INHALATION) at 06:35

## 2022-04-10 RX ADMIN — MORPHINE SULFATE: 10 INJECTION, SOLUTION INTRAMUSCULAR; INTRAVENOUS at 00:47

## 2022-04-10 RX ADMIN — ENOXAPARIN SODIUM 40 MG: 100 INJECTION SUBCUTANEOUS at 09:11

## 2022-04-10 RX ADMIN — METOPROLOL TARTRATE 5 MG: 1 INJECTION, SOLUTION INTRAVENOUS at 03:34

## 2022-04-10 RX ADMIN — KETOROLAC TROMETHAMINE 30 MG: 30 INJECTION, SOLUTION INTRAMUSCULAR; INTRAVENOUS at 20:51

## 2022-04-10 RX ADMIN — FAMOTIDINE 20 MG: 10 INJECTION INTRAVENOUS at 09:11

## 2022-04-10 RX ADMIN — METOPROLOL TARTRATE 5 MG: 1 INJECTION, SOLUTION INTRAVENOUS at 02:12

## 2022-04-10 RX ADMIN — SODIUM CHLORIDE, POTASSIUM CHLORIDE, SODIUM LACTATE AND CALCIUM CHLORIDE 125 ML/HR: 600; 310; 30; 20 INJECTION, SOLUTION INTRAVENOUS at 03:34

## 2022-04-10 RX ADMIN — KETOROLAC TROMETHAMINE 30 MG: 30 INJECTION, SOLUTION INTRAMUSCULAR; INTRAVENOUS at 16:16

## 2022-04-10 RX ADMIN — KETOROLAC TROMETHAMINE 30 MG: 30 INJECTION, SOLUTION INTRAMUSCULAR; INTRAVENOUS at 03:34

## 2022-04-10 RX ADMIN — SODIUM CHLORIDE, POTASSIUM CHLORIDE, SODIUM LACTATE AND CALCIUM CHLORIDE 75 ML/HR: 600; 310; 30; 20 INJECTION, SOLUTION INTRAVENOUS at 13:23

## 2022-04-10 RX ADMIN — METOPROLOL TARTRATE 5 MG: 1 INJECTION, SOLUTION INTRAVENOUS at 20:51

## 2022-04-10 RX ADMIN — KETOROLAC TROMETHAMINE 30 MG: 30 INJECTION, SOLUTION INTRAMUSCULAR; INTRAVENOUS at 09:11

## 2022-04-10 NOTE — PLAN OF CARE
Goal Outcome Evaluation:               PCA remains in use. Pt has been encourage to ambulate this am. Drainage noted to dressing to abd. Abd in place.

## 2022-04-10 NOTE — PROGRESS NOTES
Noreen Aragon MD FACS  Progress Note     LOS: 2 days   Patient Care Team:  Cristian Burciaga MD as PCP - General (Sports Medicine)      Subjective     Interval History:      eating jello.  Denies nausea.  Pain controlled.  Ambulated halls     Objective     Vital Signs  Temp:  [98.1 °F (36.7 °C)-98.8 °F (37.1 °C)] 98.8 °F (37.1 °C)  Heart Rate:  [] 104  Resp:  [16-18] 16  BP: ()/(63-75) 94/65    Physical Exam:  General appearance - alert, well appearing, and in no distress  Abdomen - soft, appropriately tender, some distension      Results Review:    Lab Results (last 24 hours)     Procedure Component Value Units Date/Time    Basic Metabolic Panel [[id number]]  (Normal) Collected: 04/10/22 0740    Specimen: Blood Updated: 04/10/22 0831     Glucose 93 mg/dL      BUN 19 mg/dL      Creatinine 1.23 mg/dL      Sodium 137 mmol/L      Potassium 4.3 mmol/L      Chloride 99 mmol/L      CO2 27.0 mmol/L      Calcium 8.8 mg/dL      BUN/Creatinine Ratio 15.4     Anion Gap 11.0 mmol/L      eGFR 72.4 mL/min/1.73      Comment: National Kidney Foundation and American Society of Nephrology (ASN) Task Force recommended calculation based on the Chronic Kidney Disease Epidemiology Collaboration (CKD-EPI) equation refit without adjustment for race.       Narrative:      GFR Normal >60  Chronic Kidney Disease <60  Kidney Failure <15      CBC (No Diff) [[id number]]  (Abnormal) Collected: 04/10/22 0740    Specimen: Blood Updated: 04/10/22 0811     WBC 8.35 10*3/mm3      RBC 3.74 10*6/mm3      Hemoglobin 8.1 g/dL      Hematocrit 28.7 %      MCV 76.7 fL      MCH 21.7 pg      MCHC 28.2 g/dL      RDW 17.0 %      RDW-SD 47.8 fl      MPV 10.8 fL      Platelets 340 10*3/mm3         Imaging Results (Last 24 Hours)     ** No results found for the last 24 hours. **            Assessment/Plan       POD#2 sigmoidectomy, appy    Clears as tolerated  Dc tapia  Watch H/H      Noreen Aragon MD  04/10/22  08:41 CDT

## 2022-04-11 LAB
ANION GAP SERPL CALCULATED.3IONS-SCNC: 11 MMOL/L (ref 5–15)
BUN SERPL-MCNC: 15 MG/DL (ref 6–20)
BUN/CREAT SERPL: 15.5 (ref 7–25)
CALCIUM SPEC-SCNC: 9.1 MG/DL (ref 8.6–10.5)
CHLORIDE SERPL-SCNC: 98 MMOL/L (ref 98–107)
CO2 SERPL-SCNC: 26 MMOL/L (ref 22–29)
CREAT SERPL-MCNC: 0.97 MG/DL (ref 0.76–1.27)
DEPRECATED RDW RBC AUTO: 47.6 FL (ref 37–54)
EGFRCR SERPLBLD CKD-EPI 2021: 96.3 ML/MIN/1.73
ERYTHROCYTE [DISTWIDTH] IN BLOOD BY AUTOMATED COUNT: 17 % (ref 12.3–15.4)
GLUCOSE SERPL-MCNC: 89 MG/DL (ref 65–99)
HCT VFR BLD AUTO: 27.7 % (ref 37.5–51)
HGB BLD-MCNC: 7.8 G/DL (ref 13–17.7)
MAGNESIUM SERPL-MCNC: 2.1 MG/DL (ref 1.6–2.6)
MCH RBC QN AUTO: 21.7 PG (ref 26.6–33)
MCHC RBC AUTO-ENTMCNC: 28.2 G/DL (ref 31.5–35.7)
MCV RBC AUTO: 77.2 FL (ref 79–97)
PLATELET # BLD AUTO: 321 10*3/MM3 (ref 140–450)
PMV BLD AUTO: 10.6 FL (ref 6–12)
POTASSIUM SERPL-SCNC: 4 MMOL/L (ref 3.5–5.2)
RBC # BLD AUTO: 3.59 10*6/MM3 (ref 4.14–5.8)
SODIUM SERPL-SCNC: 135 MMOL/L (ref 136–145)
WBC NRBC COR # BLD: 7.56 10*3/MM3 (ref 3.4–10.8)

## 2022-04-11 PROCEDURE — 25010000002 NA FERRIC GLUC CPLX PER 12.5 MG: Performed by: SPECIALIST

## 2022-04-11 PROCEDURE — 85027 COMPLETE CBC AUTOMATED: CPT | Performed by: SPECIALIST

## 2022-04-11 PROCEDURE — 83735 ASSAY OF MAGNESIUM: CPT | Performed by: SPECIALIST

## 2022-04-11 PROCEDURE — 94664 DEMO&/EVAL PT USE INHALER: CPT

## 2022-04-11 PROCEDURE — 25010000002 KETOROLAC TROMETHAMINE PER 15 MG: Performed by: SPECIALIST

## 2022-04-11 PROCEDURE — 25010000002 ENOXAPARIN PER 10 MG: Performed by: SPECIALIST

## 2022-04-11 PROCEDURE — 25010000002 MORPHINE (PF) 10 MG/ML SOLUTION: Performed by: SPECIALIST

## 2022-04-11 PROCEDURE — 94799 UNLISTED PULMONARY SVC/PX: CPT

## 2022-04-11 PROCEDURE — 80048 BASIC METABOLIC PNL TOTAL CA: CPT | Performed by: SPECIALIST

## 2022-04-11 RX ORDER — MORPHINE SULFATE 1 MG/ML
INJECTION INTRAVENOUS CONTINUOUS
Status: DISCONTINUED | OUTPATIENT
Start: 2022-04-11 | End: 2022-04-12 | Stop reason: SDUPTHER

## 2022-04-11 RX ADMIN — KETOROLAC TROMETHAMINE 30 MG: 30 INJECTION, SOLUTION INTRAMUSCULAR; INTRAVENOUS at 03:23

## 2022-04-11 RX ADMIN — ENOXAPARIN SODIUM 40 MG: 100 INJECTION SUBCUTANEOUS at 09:03

## 2022-04-11 RX ADMIN — KETOROLAC TROMETHAMINE 30 MG: 30 INJECTION, SOLUTION INTRAMUSCULAR; INTRAVENOUS at 09:04

## 2022-04-11 RX ADMIN — KETOROLAC TROMETHAMINE 30 MG: 30 INJECTION, SOLUTION INTRAMUSCULAR; INTRAVENOUS at 16:13

## 2022-04-11 RX ADMIN — IPRATROPIUM BROMIDE AND ALBUTEROL SULFATE 3 ML: .5; 3 SOLUTION RESPIRATORY (INHALATION) at 06:09

## 2022-04-11 RX ADMIN — MORPHINE SULFATE: 10 INJECTION, SOLUTION INTRAMUSCULAR; INTRAVENOUS at 16:00

## 2022-04-11 RX ADMIN — METOPROLOL TARTRATE 5 MG: 1 INJECTION, SOLUTION INTRAVENOUS at 03:23

## 2022-04-11 RX ADMIN — SODIUM CHLORIDE, POTASSIUM CHLORIDE, SODIUM LACTATE AND CALCIUM CHLORIDE 75 ML/HR: 600; 310; 30; 20 INJECTION, SOLUTION INTRAVENOUS at 18:48

## 2022-04-11 RX ADMIN — FAMOTIDINE 20 MG: 10 INJECTION INTRAVENOUS at 21:48

## 2022-04-11 RX ADMIN — SODIUM CHLORIDE, POTASSIUM CHLORIDE, SODIUM LACTATE AND CALCIUM CHLORIDE 75 ML/HR: 600; 310; 30; 20 INJECTION, SOLUTION INTRAVENOUS at 00:44

## 2022-04-11 RX ADMIN — FAMOTIDINE 20 MG: 10 INJECTION INTRAVENOUS at 09:05

## 2022-04-11 RX ADMIN — IPRATROPIUM BROMIDE AND ALBUTEROL SULFATE 3 ML: .5; 3 SOLUTION RESPIRATORY (INHALATION) at 18:55

## 2022-04-11 RX ADMIN — IPRATROPIUM BROMIDE AND ALBUTEROL SULFATE 3 ML: .5; 3 SOLUTION RESPIRATORY (INHALATION) at 13:04

## 2022-04-11 RX ADMIN — METOPROLOL TARTRATE 5 MG: 1 INJECTION, SOLUTION INTRAVENOUS at 21:49

## 2022-04-11 RX ADMIN — KETOROLAC TROMETHAMINE 30 MG: 30 INJECTION, SOLUTION INTRAMUSCULAR; INTRAVENOUS at 21:49

## 2022-04-11 RX ADMIN — SODIUM CHLORIDE 125 MG: 9 INJECTION, SOLUTION INTRAVENOUS at 15:40

## 2022-04-11 NOTE — PAYOR COMM NOTE
"Alicia Macedo (46 y.o. Male) [id number]  Cont stay  Please review clinical for additional days   Pt remains in hospital   [facility] phone   Fax                Date of Birth   1975    Social Security Number       Address   [address] [facility] [address]    Home Phone   [phone]    MRN   [id number]       [place]   Other    Marital Status                               Admission Date   4/8/22    Admission Type   Elective    Admitting Provider   Noreen Aragon MD    Attending Provider   Noreen Aragon MD    Department, Room/Bed   [facility] 3C, 380/1       Discharge Date       Discharge Disposition       Discharge Destination                               Attending Provider: Noreen Aragon MD    Allergies: No Known Allergies    Isolation: None   Infection: None   Code Status: CPR   Advance Care Planning Activity    Ht: 153 cm (60.24\")   Wt: 98 kg (216 lb 0.8 oz)    Admission Cmt: None   Principal Problem: None                Active Insurance as of 4/8/2022     Primary Coverage     Payor Plan Insurance Group Employer/Plan Group    [employer] [facility] [employer] [facility] [id number]     Payor Plan Address Payor Plan Phone Number Payor Plan Fax Number Effective Dates    [address] [phone]  1/1/2021 - None Entered    [place] [address]       Subscriber Name Subscriber Birth Date Member ID       ALICIA MACEDO 1975 [id number]                 Emergency Contacts      (Rel.) Home Phone Work Phone Mobile Phone    SHAHNAZ MACEDO (Spouse) -- -- [phone]              Current Facility-Administered Medications   Medication Dose Route Frequency Provider Last Rate Last Admin   • enoxaparin (LOVENOX) syringe 40 mg  40 mg Subcutaneous Daily Noreen Aragon MD   40 mg at 04/11/22 0903   • famotidine (PEPCID) injection 20 mg  20 mg Intravenous Q12H Noreen Aragon MD   20 mg at 04/11/22 " 0905   • HYDROmorphone (DILAUDID) injection 0.5 mg  0.5 mg Intravenous Q4H PRN Noreen Aragon MD       • ipratropium-albuterol (DUO-NEB) nebulizer solution 3 mL  3 mL Nebulization Q6H While Awake - RT Noreen Aragon MD   3 mL at 04/11/22 0609   • ketorolac (TORADOL) injection 30 mg  30 mg Intravenous Q6H Noreen Aragon MD   30 mg at 04/11/22 0904   • lactated ringers infusion  75 mL/hr Intravenous Continuous Noreen Aragon MD 75 mL/hr at 04/11/22 0044 75 mL/hr at 04/11/22 0044   • metoprolol tartrate (LOPRESSOR) injection 5 mg  5 mg Intravenous Q6H Noreen Aragon MD   5 mg at 04/11/22 0323   • Morphine sulfate PCA 1 mg/mL 30 mL syringe   Intravenous Continuous Noreen Aragon MD   Currently Infusing at 04/10/22 0910   • naloxone (NARCAN) injection 0.1 mg  0.1 mg Intravenous Q5 Min PRN Noreen Aragon MD       • ondansetron (ZOFRAN) injection 4 mg  4 mg Intravenous Q4H PRN Noreen Aragon MD         Lab Results (last 24 hours)     Procedure Component Value Units Date/Time    CBC (No Diff) [[id number]]  (Abnormal) Collected: 04/11/22 0815    Specimen: Blood Updated: 04/11/22 0855     WBC 7.56 10*3/mm3      RBC 3.59 10*6/mm3      Hemoglobin 7.8 g/dL      Hematocrit 27.7 %      MCV 77.2 fL      MCH 21.7 pg      MCHC 28.2 g/dL      RDW 17.0 %      RDW-SD 47.6 fl      MPV 10.6 fL      Platelets 321 10*3/mm3     Basic Metabolic Panel [[id number]] Collected: 04/11/22 0815    Specimen: Blood Updated: 04/11/22 0851    Magnesium [[id number]] Collected: 04/11/22 0815    Specimen: Blood Updated: 04/11/22 0851           Operative/Procedure Notes (last 72 hours)      Noreen Aragon MD at 04/08/22 0919        Preoperative diagnosis:  Sigmoid colon cancer  Postoperative diagnosis:  Same  Procedure:  Hand-assisted converted to open laparoscopic sigmoidectomy, appendectomy, resection abdominal cavity nodule  Surgeon: Noreen Aragon MD  Anesthesia:  Get  Ebl:  150cc  Ivf:  See anesthesia  Indications: the patient  is a 46-year-old male who has been diagnosed with sigmoid adenocarcinoma.  He presents for partial colectomy.  The risks, benefits, complications, and possible alternatives were discussed with the patient who agreed to proceed.  Description of procedure:  The patient was laid in modified lithotomy in stirrups.  The abdomen was prepped and draped.  The abdomen was entered with veress.  A 5mm trocar was placed in the supraumbiilcal region.  A midline incision was created at the midline infraumbilical for placement of the gelport.  The mass was palpable in the left lower quadrant.  Visualization was extremely difficult as the patient has significant redundancy of the entire colon.  The mass was adherent to the posterior wall just inferior to the cecum.  Tracing the colon proximally and distally was also difficult due to the redundance that I couldn't tell which way was headed proximally or distally.  Because of the visualization difficulty, the gelport was removed and the midline incision was enlarged.  It was noted that the mass was just inferior to the cecum adherent to the posterior abdominal wall.  Blunt dissection was used to free the mass.  Doing so, it was noted that the distal colon was dilated and redundant.  The proximal bowel was tethered to the root of the mesentery.  It traveled transversely and at the junction of the sigmoid and descending, it was tightly tethered to the posterolateral wall.  Using multiple hands for retraction, the lateral peritoneal reflection was opened at the proximal sigmoid and descending colon to just distal to the splenic flexure.  Blunt dissection was used to free and elevate the descending and proximal sigmoid colons.  A curved contour TA45 blue stapler was used to transect the sigmoid proximal and distal to the mass.  The mesentery was transected using Enseal.  A silk stitch was placed at the proximal staple line of the specimen.  A side to side, functional end to end stapled  colocolonic anastomosis was created using KAUSAHL 75 blue stapler and TA 90 blue stapler.  The antimesenteric walls were connected with 2-0 silk.  The staple line was buried using 2-0 silk in interrupted Lembert fashion.  The mesenteric defect was closed with 2-0 silk.  A 1cm round nodule was seen near the mesentery of the transverse colon. It was likely just adipose tissue, but it was sent to pathology as well. The peritoneal cavity was copiously irrigated with sterile saline with Mefoxin.  The abdominal wall was closed with #1 looped PDS x 2.  The skin was closed with staples.  Dry dressings were applied.  The sponge, needle, and instrument counts were correct.  Complications: none  Disposition good to pacu  Findings:  Very thin-walled abdominal cavity with very redundant colon; normal-appearing appendix, mass at mid sigmoid. The mass was adherent to the posterior abdominal wall at the right lower quadrant.  No peritoneal seeding.    Electronically signed by Noreen Aragon MD at 04/08/22 1207          Physician Progress Notes (last 72 hours)      Cristian Burciaga MD at 04/11/22 0718        Continuity note:    Patient with newly diagnosed colon cancer and brought in for surgery by Dr. Aragon.  Came by to check on him and he states he is doing relatively well.  Still not much in regard of bowel function but is starting to feel things move around.  No nausea.  Briefly reviewed notes.  Appreciate the excellent care he is getting from Dr. Aragon.  Hopefully he will be able to be discharged home soon.  Should any medical issues arise prior to that please feel free to contact me, otherwise follow peripherally and see him back in the office at his follow-up appointment.    Electronically signed by Cristian Burciaga MD, 04/11/22, 7:20 AM CDT.    Electronically signed by Cristian Burciaga MD at 04/11/22 0720     Noreen Aragon MD at 04/10/22 0840          Noreen MCGEE. MD Mahesh FACS  Progress Note     LOS: 2  days   Patient Care Team:  Cristian Burciaga MD as PCP - General (Sports Medicine)      Subjective     Interval History:      eating jello.  Denies nausea.  Pain controlled.  Ambulated halls     Objective     Vital Signs  Temp:  [98.1 °F (36.7 °C)-98.8 °F (37.1 °C)] 98.8 °F (37.1 °C)  Heart Rate:  [] 104  Resp:  [16-18] 16  BP: ()/(63-75) 94/65    Physical Exam:  General appearance - alert, well appearing, and in no distress  Abdomen - soft, appropriately tender, some distension      Results Review:    Lab Results (last 24 hours)     Procedure Component Value Units Date/Time    Basic Metabolic Panel [[id number]]  (Normal) Collected: 04/10/22 0740    Specimen: Blood Updated: 04/10/22 0831     Glucose 93 mg/dL      BUN 19 mg/dL      Creatinine 1.23 mg/dL      Sodium 137 mmol/L      Potassium 4.3 mmol/L      Chloride 99 mmol/L      CO2 27.0 mmol/L      Calcium 8.8 mg/dL      BUN/Creatinine Ratio 15.4     Anion Gap 11.0 mmol/L      eGFR 72.4 mL/min/1.73      Comment: National Kidney Foundation and American Society of Nephrology (ASN) Task Force recommended calculation based on the Chronic Kidney Disease Epidemiology Collaboration (CKD-EPI) equation refit without adjustment for race.       Narrative:      GFR Normal >60  Chronic Kidney Disease <60  Kidney Failure <15      CBC (No Diff) [[id number]]  (Abnormal) Collected: 04/10/22 0740    Specimen: Blood Updated: 04/10/22 0811     WBC 8.35 10*3/mm3      RBC 3.74 10*6/mm3      Hemoglobin 8.1 g/dL      Hematocrit 28.7 %      MCV 76.7 fL      MCH 21.7 pg      MCHC 28.2 g/dL      RDW 17.0 %      RDW-SD 47.8 fl      MPV 10.8 fL      Platelets 340 10*3/mm3         Imaging Results (Last 24 Hours)     ** No results found for the last 24 hours. **            Assessment/Plan       POD#2 sigmoidectomy, appy    Clears as tolerated  Dc tapia  Watch H/H      Noreen Aragon MD  04/10/22  08:41 CDT        Electronically signed by Noreen Aragon MD at 04/10/22 0842      Noreen Aragon MD at 04/09/22 0805          Noreen Aragon MD FACS  Progress Note     LOS: 1 day   Patient Care Team:  Cristian Burciaga MD as PCP - General (Sports Medicine)      Subjective     Interval History:      no events over pm.  Pain controlled     Objective     Vital Signs  Temp:  [96.6 °F (35.9 °C)-98.5 °F (36.9 °C)] 98.5 °F (36.9 °C)  Heart Rate:  [81-97] 90  Resp:  [12-19] 18  BP: ()/(59-92) 94/63    Physical Exam:  General appearance - alert, well appearing, and in no distress  Abdomen - soft, appropriately tender      Results Review:    Lab Results (last 24 hours)     Procedure Component Value Units Date/Time    CBC (No Diff) [[id number]]  (Abnormal) Collected: 04/09/22 0650    Specimen: Blood Updated: 04/09/22 0801     WBC 9.60 10*3/mm3      RBC 4.19 10*6/mm3      Hemoglobin 9.1 g/dL      Hematocrit 32.2 %      MCV 76.8 fL      MCH 21.7 pg      MCHC 28.3 g/dL      RDW 17.1 %      RDW-SD 47.6 fl      MPV 10.8 fL      Platelets 397 10*3/mm3     Basic Metabolic Panel [[id number]] Collected: 04/09/22 0650    Specimen: Blood Updated: 04/09/22 0757    Magnesium [[id number]] Collected: 04/09/22 0650    Specimen: Blood Updated: 04/09/22 0757    Phosphorus [[id number]] Collected: 04/09/22 0650    Specimen: Blood Updated: 04/09/22 0757    Tissue Pathology Exam [[id number]] Collected: 04/08/22 0953    Specimen: Tissue from Large Intestine, Sigmoid Colon Updated: 04/08/22 1319    POC Glucose Once [[id number]]  (Abnormal) Collected: 04/08/22 1136    Specimen: Blood Updated: 04/08/22 1147     Glucose 170 mg/dL      Comment: : [id number] Lax Ai  SMeter ID: [id number]           Imaging Results (Last 24 Hours)     ** No results found for the last 24 hours. **            Assessment/Plan       POD#1 sigmoidectomy    Clears as tolerated      Noreen Aragon MD  04/09/22  08:05 CDT        Electronically signed by Noreen Aragon MD at 04/09/22 0805 4/9 nurse note :   Morphine PCA continues  "with scheduled Toradol pt reports \"tolerable\" pain levels. F/C intact draining clear yellow urine. O2 weaned to 6L with home c-pap machine. Midline drsg intact. SCD's in place. Will continue to monitor.  hrt rate  108 , 102 ,100 , 109   H/H 9.1/32.2        4/10  PCA remains in use. Pt has been encourage to ambulate this am. Drainage noted to dressing to abd. Abd in place  Lopressor iv x4       4/11 cont with Morphine PCA  .  IVFL 75 HR    CONT pepcid iv    Cont Toradol iv q6 hrs     Lopressor iv x1    "

## 2022-04-11 NOTE — PROGRESS NOTES
Continuity note:    Patient with newly diagnosed colon cancer and brought in for surgery by Dr. Aragon.  Came by to check on him and he states he is doing relatively well.  Still not much in regard of bowel function but is starting to feel things move around.  No nausea.  Briefly reviewed notes.  Appreciate the excellent care he is getting from Dr. Aragon.  Hopefully he will be able to be discharged home soon.  Should any medical issues arise prior to that please feel free to contact me, otherwise follow peripherally and see him back in the office at his follow-up appointment.    Electronically signed by Cristian Burciaga MD, 04/11/22, 7:20 AM CDT.

## 2022-04-12 LAB
ANION GAP SERPL CALCULATED.3IONS-SCNC: 16 MMOL/L (ref 5–15)
BUN SERPL-MCNC: 11 MG/DL (ref 6–20)
BUN/CREAT SERPL: 11.6 (ref 7–25)
CALCIUM SPEC-SCNC: 9.1 MG/DL (ref 8.6–10.5)
CHLORIDE SERPL-SCNC: 95 MMOL/L (ref 98–107)
CO2 SERPL-SCNC: 25 MMOL/L (ref 22–29)
CREAT SERPL-MCNC: 0.95 MG/DL (ref 0.76–1.27)
DEPRECATED RDW RBC AUTO: 47.4 FL (ref 37–54)
EGFRCR SERPLBLD CKD-EPI 2021: 98.7 ML/MIN/1.73
ERYTHROCYTE [DISTWIDTH] IN BLOOD BY AUTOMATED COUNT: 16.8 % (ref 12.3–15.4)
GLUCOSE BLDC GLUCOMTR-MCNC: 87 MG/DL (ref 70–130)
GLUCOSE SERPL-MCNC: 80 MG/DL (ref 65–99)
HCT VFR BLD AUTO: 28.8 % (ref 37.5–51)
HGB BLD-MCNC: 8.2 G/DL (ref 13–17.7)
MCH RBC QN AUTO: 21.9 PG (ref 26.6–33)
MCHC RBC AUTO-ENTMCNC: 28.5 G/DL (ref 31.5–35.7)
MCV RBC AUTO: 77 FL (ref 79–97)
PLATELET # BLD AUTO: 335 10*3/MM3 (ref 140–450)
PMV BLD AUTO: 10.3 FL (ref 6–12)
POTASSIUM SERPL-SCNC: 4.1 MMOL/L (ref 3.5–5.2)
RBC # BLD AUTO: 3.74 10*6/MM3 (ref 4.14–5.8)
SODIUM SERPL-SCNC: 136 MMOL/L (ref 136–145)
WBC NRBC COR # BLD: 5.11 10*3/MM3 (ref 3.4–10.8)

## 2022-04-12 PROCEDURE — 80048 BASIC METABOLIC PNL TOTAL CA: CPT | Performed by: SPECIALIST

## 2022-04-12 PROCEDURE — 25010000002 ENOXAPARIN PER 10 MG: Performed by: SPECIALIST

## 2022-04-12 PROCEDURE — 25010000002 KETOROLAC TROMETHAMINE PER 15 MG: Performed by: SPECIALIST

## 2022-04-12 PROCEDURE — 82962 GLUCOSE BLOOD TEST: CPT

## 2022-04-12 PROCEDURE — 94799 UNLISTED PULMONARY SVC/PX: CPT

## 2022-04-12 PROCEDURE — 25010000002 NA FERRIC GLUC CPLX PER 12.5 MG: Performed by: SPECIALIST

## 2022-04-12 PROCEDURE — 85027 COMPLETE CBC AUTOMATED: CPT | Performed by: SPECIALIST

## 2022-04-12 PROCEDURE — 94664 DEMO&/EVAL PT USE INHALER: CPT

## 2022-04-12 RX ORDER — MORPHINE SULFATE 1 MG/ML
INJECTION INTRAVENOUS CONTINUOUS
Status: DISCONTINUED | OUTPATIENT
Start: 2022-04-12 | End: 2022-04-15 | Stop reason: HOSPADM

## 2022-04-12 RX ADMIN — IPRATROPIUM BROMIDE AND ALBUTEROL SULFATE 3 ML: .5; 3 SOLUTION RESPIRATORY (INHALATION) at 12:40

## 2022-04-12 RX ADMIN — SODIUM CHLORIDE 125 MG: 9 INJECTION, SOLUTION INTRAVENOUS at 09:08

## 2022-04-12 RX ADMIN — FAMOTIDINE 20 MG: 10 INJECTION INTRAVENOUS at 09:08

## 2022-04-12 RX ADMIN — ENOXAPARIN SODIUM 40 MG: 100 INJECTION SUBCUTANEOUS at 09:08

## 2022-04-12 RX ADMIN — METOPROLOL TARTRATE 5 MG: 1 INJECTION, SOLUTION INTRAVENOUS at 09:08

## 2022-04-12 RX ADMIN — KETOROLAC TROMETHAMINE 30 MG: 30 INJECTION, SOLUTION INTRAMUSCULAR; INTRAVENOUS at 05:25

## 2022-04-12 RX ADMIN — IPRATROPIUM BROMIDE AND ALBUTEROL SULFATE 3 ML: .5; 3 SOLUTION RESPIRATORY (INHALATION) at 19:38

## 2022-04-12 RX ADMIN — METOPROLOL TARTRATE 5 MG: 1 INJECTION, SOLUTION INTRAVENOUS at 15:42

## 2022-04-12 RX ADMIN — KETOROLAC TROMETHAMINE 30 MG: 30 INJECTION, SOLUTION INTRAMUSCULAR; INTRAVENOUS at 09:08

## 2022-04-12 RX ADMIN — METOPROLOL TARTRATE 5 MG: 1 INJECTION, SOLUTION INTRAVENOUS at 05:25

## 2022-04-12 RX ADMIN — IPRATROPIUM BROMIDE AND ALBUTEROL SULFATE 3 ML: .5; 3 SOLUTION RESPIRATORY (INHALATION) at 05:42

## 2022-04-12 RX ADMIN — KETOROLAC TROMETHAMINE 30 MG: 30 INJECTION, SOLUTION INTRAMUSCULAR; INTRAVENOUS at 20:45

## 2022-04-12 RX ADMIN — KETOROLAC TROMETHAMINE 30 MG: 30 INJECTION, SOLUTION INTRAMUSCULAR; INTRAVENOUS at 15:42

## 2022-04-12 RX ADMIN — FAMOTIDINE 20 MG: 10 INJECTION INTRAVENOUS at 20:40

## 2022-04-12 RX ADMIN — METOPROLOL TARTRATE 5 MG: 1 INJECTION, SOLUTION INTRAVENOUS at 20:45

## 2022-04-12 RX ADMIN — SODIUM CHLORIDE, POTASSIUM CHLORIDE, SODIUM LACTATE AND CALCIUM CHLORIDE 75 ML/HR: 600; 310; 30; 20 INJECTION, SOLUTION INTRAVENOUS at 15:42

## 2022-04-12 RX ADMIN — SODIUM CHLORIDE, POTASSIUM CHLORIDE, SODIUM LACTATE AND CALCIUM CHLORIDE 75 ML/HR: 600; 310; 30; 20 INJECTION, SOLUTION INTRAVENOUS at 05:26

## 2022-04-12 NOTE — PROGRESS NOTES
Noreen Aragon MD FACS  Progress Note     LOS: 4 days   Patient Care Team:  Cristian Burciaga MD as PCP - General (Sports Medicine)      Subjective     Interval History:      some burping.  Denies nausea.  No flatus  No bm     Objective     Vital Signs  Temp:  [97.4 °F (36.3 °C)-98.5 °F (36.9 °C)] 97.8 °F (36.6 °C)  Heart Rate:  [83-93] 84  Resp:  [16-18] 16  BP: (112-127)/(63-83) 120/66    Physical Exam:  General appearance - alert, well appearing, and in no distress  Abdomen - soft, clean, distended      Results Review:    Lab Results (last 24 hours)     Procedure Component Value Units Date/Time    Basic Metabolic Panel [[id number]]  (Abnormal) Collected: 04/12/22 0630    Specimen: Blood Updated: 04/12/22 0734     Glucose 80 mg/dL      BUN 11 mg/dL      Creatinine 0.95 mg/dL      Sodium 136 mmol/L      Potassium 4.1 mmol/L      Chloride 95 mmol/L      CO2 25.0 mmol/L      Calcium 9.1 mg/dL      BUN/Creatinine Ratio 11.6     Anion Gap 16.0 mmol/L      eGFR 98.7 mL/min/1.73      Comment: National Kidney Foundation and American Society of Nephrology (ASN) Task Force recommended calculation based on the Chronic Kidney Disease Epidemiology Collaboration (CKD-EPI) equation refit without adjustment for race.       Narrative:      GFR Normal >60  Chronic Kidney Disease <60  Kidney Failure <15      CBC (No Diff) [[id number]]  (Abnormal) Collected: 04/12/22 0630    Specimen: Blood Updated: 04/12/22 0709     WBC 5.11 10*3/mm3      RBC 3.74 10*6/mm3      Hemoglobin 8.2 g/dL      Hematocrit 28.8 %      MCV 77.0 fL      MCH 21.9 pg      MCHC 28.5 g/dL      RDW 16.8 %      RDW-SD 47.4 fl      MPV 10.3 fL      Platelets 335 10*3/mm3     POC Glucose Once [[id number]]  (Normal) Collected: 04/12/22 0058    Specimen: Blood Updated: 04/12/22 0109     Glucose 87 mg/dL      Comment: : [id number] Brian PatlinMeter ID: [id number]       Tissue Pathology Exam [[id number]] Collected: 04/08/22 0953    Specimen: Tissue from Large  Intestine, Sigmoid Colon; Tissue from Large Intestine, Appendix Updated: 04/11/22 1418    Basic Metabolic Panel [[id number]]  (Abnormal) Collected: 04/11/22 0815    Specimen: Blood Updated: 04/11/22 0916     Glucose 89 mg/dL      BUN 15 mg/dL      Creatinine 0.97 mg/dL      Sodium 135 mmol/L      Potassium 4.0 mmol/L      Chloride 98 mmol/L      CO2 26.0 mmol/L      Calcium 9.1 mg/dL      BUN/Creatinine Ratio 15.5     Anion Gap 11.0 mmol/L      eGFR 96.3 mL/min/1.73      Comment: National Kidney Foundation and American Society of Nephrology (ASN) Task Force recommended calculation based on the Chronic Kidney Disease Epidemiology Collaboration (CKD-EPI) equation refit without adjustment for race.       Narrative:      GFR Normal >60  Chronic Kidney Disease <60  Kidney Failure <15      Magnesium [[id number]]  (Normal) Collected: 04/11/22 0815    Specimen: Blood Updated: 04/11/22 0916     Magnesium 2.1 mg/dL     CBC (No Diff) [[id number]]  (Abnormal) Collected: 04/11/22 0815    Specimen: Blood Updated: 04/11/22 0855     WBC 7.56 10*3/mm3      RBC 3.59 10*6/mm3      Hemoglobin 7.8 g/dL      Hematocrit 27.7 %      MCV 77.2 fL      MCH 21.7 pg      MCHC 28.2 g/dL      RDW 17.0 %      RDW-SD 47.6 fl      MPV 10.6 fL      Platelets 321 10*3/mm3         Imaging Results (Last 24 Hours)     ** No results found for the last 24 hours. **            Assessment/Plan       POD# 4 sigmoidectomy, appy    Keep on clears  Await bowel function return      Noreen Aragon MD  04/12/22  08:14 CDT

## 2022-04-12 NOTE — PAYOR COMM NOTE
"Alicia Macedo (46 y.o. Male) [id number]   CONT STAY  ADDITIONAL CLINICAL  Please review for approval of days   [facility] phone    Fax                Date of Birth   1975    Social Security Number       Address   [address] [facility] [address]    Home Phone   [phone]    MRN   [id number]       Taoism   Other    Marital Status                               Admission Date   4/8/22    Admission Type   Elective    Admitting Provider   Noreen Aragon MD    Attending Provider   Noreen Aragon MD    Department, Room/Bed   [facility] 3C, 380/1       Discharge Date       Discharge Disposition       Discharge Destination                               Attending Provider: Noreen Aragon MD    Allergies: No Known Allergies    Isolation: None   Infection: None   Code Status: CPR   Advance Care Planning Activity    Ht: 153 cm (60.24\")   Wt: 98 kg (216 lb 0.8 oz)    Admission Cmt: None   Principal Problem: None                Active Insurance as of 4/8/2022     Primary Coverage     Payor Plan Insurance Group Employer/Plan Group    [employer] BLUE CROSS [employer] BLUE [facility] PPO [id number]     Payor Plan Address Payor Plan Phone Number Payor Plan Fax Number Effective Dates    [address] [phone]  1/1/2021 - None Entered    [place] [address]       Subscriber Name Subscriber Birth Date Member ID       ALICIA MACEDO 1975 [id number]                 Emergency Contacts      (Rel.) Home Phone Work Phone Mobile Phone    SHAHNAZ MACEDO (Spouse) -- -- [phone]              Current Facility-Administered Medications   Medication Dose Route Frequency Provider Last Rate Last Admin   • enoxaparin (LOVENOX) syringe 40 mg  40 mg Subcutaneous Daily Noreen Aragon MD   40 mg at 04/11/22 0903   • famotidine (PEPCID) injection 20 mg  20 mg Intravenous Q12H Noreen Aragon MD   20 mg at 04/11/22 2148   • " ferric gluconate (FERRLECIT) 125 mg in sodium chloride 0.9 % 110 mL IVPB  125 mg Intravenous Daily Noreen Aragon  mL/hr at 04/11/22 1540 125 mg at 04/11/22 1540   • HYDROmorphone (DILAUDID) injection 0.5 mg  0.5 mg Intravenous Q4H PRN Noreen Aragon MD       • ipratropium-albuterol (DUO-NEB) nebulizer solution 3 mL  3 mL Nebulization Q6H While Awake - RT Noreen Aragon MD   3 mL at 04/12/22 0542   • ketorolac (TORADOL) injection 30 mg  30 mg Intravenous Q6H Noreen Aragon MD   30 mg at 04/12/22 0525   • lactated ringers infusion  75 mL/hr Intravenous Continuous Noreen Aragon MD 75 mL/hr at 04/12/22 0526 75 mL/hr at 04/12/22 0526   • metoprolol tartrate (LOPRESSOR) injection 5 mg  5 mg Intravenous Q6H Noreen Aragon MD   5 mg at 04/12/22 0525   • Morphine sulfate PCA 1 mg/mL 30 mL syringe   Intravenous Continuous Noreen Aragon MD       • naloxone (NARCAN) injection 0.1 mg  0.1 mg Intravenous Q5 Min PRN Noreen Aragon MD       • ondansetron (ZOFRAN) injection 4 mg  4 mg Intravenous Q4H PRN Noreen Aragon MD            Physician Progress Notes (last 24 hours)      Noreen Aragon MD at 04/12/22 0813          Noreen MCGEE. MD Mahesh FACS  Progress Note     LOS: 4 days   Patient Care Team:  Cristian Burciaga MD as PCP - General (Sports Medicine)      Subjective     Interval History:      some burping.  Denies nausea.  No flatus  No bm     Objective     Vital Signs  Temp:  [97.4 °F (36.3 °C)-98.5 °F (36.9 °C)] 97.8 °F (36.6 °C)  Heart Rate:  [83-93] 84  Resp:  [16-18] 16  BP: (112-127)/(63-83) 120/66    Physical Exam:  General appearance - alert, well appearing, and in no distress  Abdomen - soft, clean, distended      Results Review:    Lab Results (last 24 hours)     Procedure Component Value Units Date/Time    Basic Metabolic Panel [[id number]]  (Abnormal) Collected: 04/12/22 0630    Specimen: Blood Updated: 04/12/22 0734     Glucose 80 mg/dL      BUN 11 mg/dL      Creatinine 0.95  mg/dL      Sodium 136 mmol/L      Potassium 4.1 mmol/L      Chloride 95 mmol/L      CO2 25.0 mmol/L      Calcium 9.1 mg/dL      BUN/Creatinine Ratio 11.6     Anion Gap 16.0 mmol/L      eGFR 98.7 mL/min/1.73      Comment: National Kidney Foundation and American Society of Nephrology (ASN) Task Force recommended calculation based on the Chronic Kidney Disease Epidemiology Collaboration (CKD-EPI) equation refit without adjustment for race.       Narrative:      GFR Normal >60  Chronic Kidney Disease <60  Kidney Failure <15      CBC (No Diff) [[id number]]  (Abnormal) Collected: 04/12/22 0630    Specimen: Blood Updated: 04/12/22 0709     WBC 5.11 10*3/mm3      RBC 3.74 10*6/mm3      Hemoglobin 8.2 g/dL      Hematocrit 28.8 %      MCV 77.0 fL      MCH 21.9 pg      MCHC 28.5 g/dL      RDW 16.8 %      RDW-SD 47.4 fl      MPV 10.3 fL      Platelets 335 10*3/mm3     POC Glucose Once [[id number]]  (Normal) Collected: 04/12/22 0058    Specimen: Blood Updated: 04/12/22 0109     Glucose 87 mg/dL      Comment: : [id number] Brian PatlinMeter ID: [id number]       Tissue Pathology Exam [[id number]] Collected: 04/08/22 0953    Specimen: Tissue from Large Intestine, Sigmoid Colon; Tissue from Large Intestine, Appendix Updated: 04/11/22 1418    Basic Metabolic Panel [[id number]]  (Abnormal) Collected: 04/11/22 0815    Specimen: Blood Updated: 04/11/22 0916     Glucose 89 mg/dL      BUN 15 mg/dL      Creatinine 0.97 mg/dL      Sodium 135 mmol/L      Potassium 4.0 mmol/L      Chloride 98 mmol/L      CO2 26.0 mmol/L      Calcium 9.1 mg/dL      BUN/Creatinine Ratio 15.5     Anion Gap 11.0 mmol/L      eGFR 96.3 mL/min/1.73      Comment: National Kidney Foundation and American Society of Nephrology (ASN) Task Force recommended calculation based on the Chronic Kidney Disease Epidemiology Collaboration (CKD-EPI) equation refit without adjustment for race.       Narrative:      GFR Normal >60  Chronic Kidney Disease <60  Kidney Failure <15       Magnesium [[id number]]  (Normal) Collected: 04/11/22 0815    Specimen: Blood Updated: 04/11/22 0916     Magnesium 2.1 mg/dL     CBC (No Diff) [[id number]]  (Abnormal) Collected: 04/11/22 0815    Specimen: Blood Updated: 04/11/22 0855     WBC 7.56 10*3/mm3      RBC 3.59 10*6/mm3      Hemoglobin 7.8 g/dL      Hematocrit 27.7 %      MCV 77.2 fL      MCH 21.7 pg      MCHC 28.2 g/dL      RDW 17.0 %      RDW-SD 47.6 fl      MPV 10.6 fL      Platelets 321 10*3/mm3         Imaging Results (Last 24 Hours)     ** No results found for the last 24 hours. **            Assessment/Plan       POD# 4 sigmoidectomy, appy    Keep on clears  Await bowel function return      Noreen Aragon MD  04/12/22  08:14 CDT        Electronically signed by Noreen Aragon MD at 04/12/22 0815         Cont iv pepcid  , iv toradol q 6

## 2022-04-13 LAB
ANION GAP SERPL CALCULATED.3IONS-SCNC: 14 MMOL/L (ref 5–15)
BUN SERPL-MCNC: 8 MG/DL (ref 6–20)
BUN/CREAT SERPL: 10.1 (ref 7–25)
CALCIUM SPEC-SCNC: 8.7 MG/DL (ref 8.6–10.5)
CHLORIDE SERPL-SCNC: 96 MMOL/L (ref 98–107)
CO2 SERPL-SCNC: 26 MMOL/L (ref 22–29)
CREAT SERPL-MCNC: 0.79 MG/DL (ref 0.76–1.27)
DEPRECATED RDW RBC AUTO: 46.9 FL (ref 37–54)
EGFRCR SERPLBLD CKD-EPI 2021: 109.6 ML/MIN/1.73
ERYTHROCYTE [DISTWIDTH] IN BLOOD BY AUTOMATED COUNT: 17 % (ref 12.3–15.4)
GLUCOSE SERPL-MCNC: 74 MG/DL (ref 65–99)
HCT VFR BLD AUTO: 26.6 % (ref 37.5–51)
HGB BLD-MCNC: 7.6 G/DL (ref 13–17.7)
MAGNESIUM SERPL-MCNC: 1.6 MG/DL (ref 1.6–2.6)
MCH RBC QN AUTO: 21.9 PG (ref 26.6–33)
MCHC RBC AUTO-ENTMCNC: 28.6 G/DL (ref 31.5–35.7)
MCV RBC AUTO: 76.7 FL (ref 79–97)
PLATELET # BLD AUTO: 300 10*3/MM3 (ref 140–450)
PMV BLD AUTO: 9.8 FL (ref 6–12)
POTASSIUM SERPL-SCNC: 4.1 MMOL/L (ref 3.5–5.2)
RBC # BLD AUTO: 3.47 10*6/MM3 (ref 4.14–5.8)
SODIUM SERPL-SCNC: 136 MMOL/L (ref 136–145)
WBC NRBC COR # BLD: 4.64 10*3/MM3 (ref 3.4–10.8)

## 2022-04-13 PROCEDURE — 83735 ASSAY OF MAGNESIUM: CPT | Performed by: SPECIALIST

## 2022-04-13 PROCEDURE — 94799 UNLISTED PULMONARY SVC/PX: CPT

## 2022-04-13 PROCEDURE — 25010000002 ENOXAPARIN PER 10 MG: Performed by: SPECIALIST

## 2022-04-13 PROCEDURE — 80048 BASIC METABOLIC PNL TOTAL CA: CPT | Performed by: SPECIALIST

## 2022-04-13 PROCEDURE — 94664 DEMO&/EVAL PT USE INHALER: CPT

## 2022-04-13 PROCEDURE — 25010000002 NA FERRIC GLUC CPLX PER 12.5 MG: Performed by: SPECIALIST

## 2022-04-13 PROCEDURE — 25010000002 KETOROLAC TROMETHAMINE PER 15 MG: Performed by: SPECIALIST

## 2022-04-13 PROCEDURE — 25010000002 MORPHINE (PF) 10 MG/ML SOLUTION: Performed by: SPECIALIST

## 2022-04-13 PROCEDURE — 85027 COMPLETE CBC AUTOMATED: CPT | Performed by: SPECIALIST

## 2022-04-13 RX ORDER — KETOROLAC TROMETHAMINE 30 MG/ML
30 INJECTION, SOLUTION INTRAMUSCULAR; INTRAVENOUS EVERY 6 HOURS PRN
Status: DISCONTINUED | OUTPATIENT
Start: 2022-04-13 | End: 2022-04-15 | Stop reason: HOSPADM

## 2022-04-13 RX ORDER — MORPHINE SULFATE 1 MG/ML
INJECTION INTRAVENOUS CONTINUOUS
Status: DISCONTINUED | OUTPATIENT
Start: 2022-04-13 | End: 2022-04-13 | Stop reason: SDUPTHER

## 2022-04-13 RX ORDER — DEXTROSE, SODIUM CHLORIDE, AND POTASSIUM CHLORIDE 5; .45; .075 G/100ML; G/100ML; G/100ML
75 INJECTION INTRAVENOUS CONTINUOUS
Status: DISCONTINUED | OUTPATIENT
Start: 2022-04-13 | End: 2022-04-15 | Stop reason: HOSPADM

## 2022-04-13 RX ADMIN — METOPROLOL TARTRATE 5 MG: 1 INJECTION, SOLUTION INTRAVENOUS at 14:56

## 2022-04-13 RX ADMIN — FAMOTIDINE 20 MG: 10 INJECTION INTRAVENOUS at 08:25

## 2022-04-13 RX ADMIN — METOPROLOL TARTRATE 5 MG: 1 INJECTION, SOLUTION INTRAVENOUS at 05:01

## 2022-04-13 RX ADMIN — ENOXAPARIN SODIUM 40 MG: 100 INJECTION SUBCUTANEOUS at 08:26

## 2022-04-13 RX ADMIN — METOPROLOL TARTRATE 5 MG: 1 INJECTION, SOLUTION INTRAVENOUS at 22:16

## 2022-04-13 RX ADMIN — IPRATROPIUM BROMIDE AND ALBUTEROL SULFATE 3 ML: .5; 3 SOLUTION RESPIRATORY (INHALATION) at 18:25

## 2022-04-13 RX ADMIN — IPRATROPIUM BROMIDE AND ALBUTEROL SULFATE 3 ML: .5; 3 SOLUTION RESPIRATORY (INHALATION) at 14:01

## 2022-04-13 RX ADMIN — POTASSIUM CHLORIDE, DEXTROSE MONOHYDRATE AND SODIUM CHLORIDE 75 ML/HR: 75; 5; 450 INJECTION, SOLUTION INTRAVENOUS at 10:31

## 2022-04-13 RX ADMIN — SODIUM CHLORIDE, POTASSIUM CHLORIDE, SODIUM LACTATE AND CALCIUM CHLORIDE 75 ML/HR: 600; 310; 30; 20 INJECTION, SOLUTION INTRAVENOUS at 05:00

## 2022-04-13 RX ADMIN — FAMOTIDINE 20 MG: 10 INJECTION INTRAVENOUS at 22:12

## 2022-04-13 RX ADMIN — MORPHINE SULFATE: 10 INJECTION, SOLUTION INTRAMUSCULAR; INTRAVENOUS at 20:06

## 2022-04-13 RX ADMIN — METOPROLOL TARTRATE 5 MG: 1 INJECTION, SOLUTION INTRAVENOUS at 08:26

## 2022-04-13 RX ADMIN — SODIUM CHLORIDE 125 MG: 9 INJECTION, SOLUTION INTRAVENOUS at 08:25

## 2022-04-13 RX ADMIN — KETOROLAC TROMETHAMINE 30 MG: 30 INJECTION, SOLUTION INTRAMUSCULAR; INTRAVENOUS at 05:01

## 2022-04-13 RX ADMIN — IPRATROPIUM BROMIDE AND ALBUTEROL SULFATE 3 ML: .5; 3 SOLUTION RESPIRATORY (INHALATION) at 06:06

## 2022-04-13 RX ADMIN — KETOROLAC TROMETHAMINE 30 MG: 30 INJECTION, SOLUTION INTRAMUSCULAR; INTRAVENOUS at 08:25

## 2022-04-13 NOTE — PROGRESS NOTES
Noreen Aragon MD FACS  Progress Note     LOS: 5 days   Patient Care Team:  Cristian Burciaga MD as PCP - General (Sports Medicine)      Subjective     Interval History:      no nausea or vomiting.  +flatus +bm  Pain controlled     Objective     Vital Signs  Temp:  [97.7 °F (36.5 °C)-98.4 °F (36.9 °C)] 97.7 °F (36.5 °C)  Heart Rate:  [80-94] 94  Resp:  [16-20] 18  BP: (113-134)/(66-77) 117/66    Physical Exam:  General appearance - alert, well appearing, and in no distress  Abdomen - soft, appropriately tender, still distended, claen      Results Review:    Lab Results (last 24 hours)     Procedure Component Value Units Date/Time    Basic Metabolic Panel [[id number]]  (Abnormal) Collected: 04/13/22 0618    Specimen: Blood Updated: 04/13/22 0714     Glucose 74 mg/dL      BUN 8 mg/dL      Creatinine 0.79 mg/dL      Sodium 136 mmol/L      Potassium 4.1 mmol/L      Chloride 96 mmol/L      CO2 26.0 mmol/L      Calcium 8.7 mg/dL      BUN/Creatinine Ratio 10.1     Anion Gap 14.0 mmol/L      eGFR 109.6 mL/min/1.73      Comment: National Kidney Foundation and American Society of Nephrology (ASN) Task Force recommended calculation based on the Chronic Kidney Disease Epidemiology Collaboration (CKD-EPI) equation refit without adjustment for race.       Narrative:      GFR Normal >60  Chronic Kidney Disease <60  Kidney Failure <15      Magnesium [[id number]]  (Normal) Collected: 04/13/22 0618    Specimen: Blood Updated: 04/13/22 0714     Magnesium 1.6 mg/dL     CBC (No Diff) [[id number]]  (Abnormal) Collected: 04/13/22 0618    Specimen: Blood Updated: 04/13/22 0655     WBC 4.64 10*3/mm3      RBC 3.47 10*6/mm3      Hemoglobin 7.6 g/dL      Hematocrit 26.6 %      MCV 76.7 fL      MCH 21.9 pg      MCHC 28.6 g/dL      RDW 17.0 %      RDW-SD 46.9 fl      MPV 9.8 fL      Platelets 300 10*3/mm3     Tissue Pathology Exam [[id number]] Collected: 04/08/22 0953    Specimen: Tissue from Large Intestine, Sigmoid Colon; Tissue from Large  Intestine, Appendix Updated: 04/12/22 1820     Case Report --     Surgical Pathology Report                         Case: [id number]                                  Authorizing Provider:  Noreen Aragon MD       Collected:           04/08/2022 09:53 AM          Ordering Location:     [facility] OR  Received:            04/08/2022 01:19 PM          Pathologist:           Aleena Kaplan MD                                                         Specimens:   1) - Large Intestine, Sigmoid Colon, Sigmoid colon -stitch proximal, intra-abdominal                nodule                                                                                              2) - Large Intestine, Appendix                                                              Final Diagnosis --     1.  Sigmoid colon, sigmoid resection:   -Well to moderately differentiated colorectal adenocarcinoma (6.3 cm), focally invasive into the visceral peritoneum through area of inflammation.   -Positive for focal lymphvascular invasion.   -Negative for perineural invasion.   -Twenty-two regional lymph nodes, negative for metastatic carcinoma (0/22).   -Incidental prominent mucosal fold with early superficial hyperplastic changes.   -Viable opposing surgical resection margins, negative for tumor.    pTNM CLASSIFICATION:  pT4a pN0 pM (NOT APPLICABLE)    2.  Appendix, appendectomy:   -Appendix with diffuse intraluminal fibrosis and no other significant pathologic abnormalities.   -Negative for malignancy.         Synoptic Checklist --     COLON AND RECTUM: Resection, Including Transanal Disk Excision of Rectal Neoplasms  COLON AND RECTUM: RESECTION - 1  8th Edition - Protocol posted: 12/17/2021    SPECIMEN     Procedure:    Sigmoidectomy     TUMOR     Tumor Site:    Sigmoid colon      Histologic Type:    Adenocarcinoma      Histologic Grade:    G2, moderately differentiated      Tumor Size:    Greatest dimension (Centimeters): 6.5 cm     Tumor  "Extent:    Invades visceral peritoneum      Macroscopic Tumor Perforation:    Not identified      Lymphovascular Invasion:    Present      Perineural Invasion:    Not identified      Type of Polyp in which Invasive Carcinoma Arose:    None identified      Treatment Effect:    No known presurgical therapy     MARGINS     Margin Status for Invasive Carcinoma:    All margins negative for invasive carcinoma        Closest Margin(s) to Invasive Carcinoma:    Proximal        Distance from Invasive Carcinoma to Closest Margin:    1.8 cm     Margin Status for Non-Invasive Tumor:    All margins negative for high-grade dysplasia / intramucosal carcinoma and low-grade dysplasia     REGIONAL LYMPH NODES     Regional Lymph Node Status:           :    All regional lymph nodes negative for tumor        Number of Lymph Nodes Examined:    22      Tumor Deposits:    Not identified     DISTANT METASTASIS    PATHOLOGIC STAGE CLASSIFICATION (pTNM, AJCC 8th Edition)     Reporting of pT, pN, and (when applicable) pM categories is based on information available to the pathologist at the time the report is issued. As per the AJCC (Chapter 1, 8th Ed.) it is the managing physician’s responsibility to establish the final pathologic stage based upon all pertinent information, including but potentially not limited to this pathology report.     pT Category:    pT4a      pN Category:    pN0     ADDITIONAL FINDINGS     Additional Findings:    Prominent mucosal fold with early superficial hyperplastic changes        Comment(s):    Additional studies can be performed upon request by the treating oncologist.  Block 1E would be appropriate.        Gross Description --     1. Large Intestine, Sigmoid Colon.  Specimen #1 received in a formalin filled container labeled with the patient's name, date of birth, and \"sigmoid colon\".  The specimen consists of a segment of colon measuring 19.4 cm in length by 5.6 cm in diameter.  The surface of the colon is " tan-pink with black tattoo pigment centrally located.  An area of tightly adhered mesocolon is noted with roughened surface.  The resection margins are stapled and the proximal margin is marked with a suture.  The specimen is opened and allowed to fix over the weekend and 10% formalin.  Upon opening a circumferential yellow-tan mass is identified measuring 6.3 cm proximal distal by 5.4 cm in diameter.  The mass measures 1.8 cm from the proximal, sutured margin and 8.3 cm from the distal resection margin.  The soft tissue/serosa surrounding the mass is inked blue.  Sectioning shows extension deep into the surrounding soft tissue and to within less than 0.1 cm of the ragged surface on the circumferential serosal surface.  The remaining mucosa is examined and reveals a sessile polyp 0.3 cm distal to the mass and measuring 0.8 x 0.5 cm.  A section through the area shows a raised fold with possible tattoo pigment and no significant mucosal thickening.  No additional lesions are identified.     Sectioning through the surrounding soft tissue reveals multiple lymph node candidates measuring up to 0.9 cm.  Some of these lymph node candidates have gray-white, friable cut surfaces the cut surface of the remaining lymph node candidates are tan-pink and some black tattoo pigment.  Many of the lymph node candidates are noted on the edge of the mass and may have direct extension of tumor.  A soft fairly well-circumscribed yellow-tan nodule is present measuring 0.9 x 0.8 x 0.8 cm.  The cut surface is yellow-pink and fatty with no focal areas of firmness identified.  1A-sutured, proximal resection margin  1B and 1C-distal resection margin  1D-representative section of the mass and ragged circumferential/serosal surface  1E through 1H-representative sections of mass  1I-raised mucosal fold versus polyp  1J-representative section of remaining, unremarkable colon wall  1K-sectioned lymph node candidate   1L-sectioned lymph node  "candidate  1M-2 bisected lymph node candidates  1N-trisected cluster of 2 lymph node candidates  1O-2 bisected lymph node candidates  1P-trisected lymph node candidate  1Q-2 bisected lymph node candidates  1R-1 bisected lymph node candidate  1S-5 intact lymph node candidates  1T-4 intact lymph node candidates      2. Large Intestine, Appendix.  Specimen #2 received in the same formalin filled container labeled with the patient's name, date of birth, and \"appendix\".  The specimen consists of an appendix with attached mesoappendix.  The appendix measures 4.1 cm in length by 0.5 cm in diameter.  The serosal surface is yellow-tan and dusky.  The serosa at the stapled resection margin is inked black.  Sectioning reveals a narrowed lumen and patency cannot be definitively determined.  A representative section of distal tip, mid appendix, and the stapled resection margin are submitted in block 2A.            Imaging Results (Last 24 Hours)     ** No results found for the last 24 hours. **            Assessment/Plan       POD# 5 sigmoidectomy, appy    Allow full bowel function return  Will hold lovenox as H/H continues to drift downward        Noreen Aragon MD  04/13/22  10:10 CDT      "

## 2022-04-13 NOTE — CASE MANAGEMENT/SOCIAL WORK
Discharge Planning Assessment  [facility]     Patient Name: Alicia Macedo  MRN: [id number]  Today's Date: 4/13/2022    Admit Date: 4/8/2022     Discharge Needs Assessment     Row Name 04/13/22 1417       Living Environment    People in Home spouse    Current Living Arrangements home    Primary Care Provided by self    Able to Return to Prior Arrangements yes       Transition Planning    Patient/Family Anticipates Transition to home       Discharge Needs Assessment    Equipment Currently Used at Home none               Discharge Plan     Row Name 04/13/22 1418       Plan    Plan Spoke with patient in room for dc planning. Patient lives with spouse. Has pcp and Rx drug plan.  Independent prior to illness and plans to dc home with help from spouse. Will continue to follow as needed.              Continued Care and Services - Admitted Since 4/8/2022    Coordination has not been started for this encounter.          Demographic Summary    No documentation.                Functional Status    No documentation.                Psychosocial    No documentation.                Abuse/Neglect    No documentation.                Legal    No documentation.                Substance Abuse    No documentation.                Patient Forms    No documentation.                   Merlina A Fletcher, RN

## 2022-04-13 NOTE — PLAN OF CARE
Goal Outcome Evaluation:   Pt resting in chair. No complaints of pain or signs of distress noted. Pt on 2L NC. PCA pump

## 2022-04-13 NOTE — PAYOR COMM NOTE
"Alicia Macedo (46 y.o. Male) [id number]   CONT STAY  PLEASE REVIEW CLINICAL FOR ADDITIONAL DAYS  [facility] phone   Fax                 Date of Birth   1975    Social Security Number       Address   [address] [facility] [address]    Home Phone   [phone]    MRN   [id number]       Jew   Other    Marital Status                               Admission Date   4/8/22    Admission Type   Elective    Admitting Provider   Noreen Aragon MD    Attending Provider   Noreen Aragon MD    Department, Room/Bed   [facility] 3C, 380/1       Discharge Date       Discharge Disposition       Discharge Destination                               Attending Provider: Noreen Aragon MD    Allergies: No Known Allergies    Isolation: None   Infection: None   Code Status: CPR   Advance Care Planning Activity    Ht: 153 cm (60.24\")   Wt: 98 kg (216 lb 0.8 oz)    Admission Cmt: None   Principal Problem: None                Active Insurance as of 4/8/2022     Primary Coverage     Payor Plan Insurance Group Employer/Plan Group    [email or web address] PPO [id number]     Payor Plan Address Payor Plan Phone Number Payor Plan Fax Number Effective Dates    [address] [phone]  1/1/2021 - None Entered    [place] [address]       Subscriber Name Subscriber Birth Date Member ID       ALICIA MACEDO 1975 [id number]                 Emergency Contacts      (Rel.) Home Phone Work Phone Mobile Phone    SHAHNAZ MACEDO (Spouse) -- -- [phone]              Current Facility-Administered Medications   Medication Dose Route Frequency Provider Last Rate Last Admin   • dextrose 5 % and sodium chloride 0.45 % with KCl 10 mEq/L infusion  75 mL/hr Intravenous Continuous Noreen Aragon MD 75 mL/hr at 04/13/22 1031 75 mL/hr at 04/13/22 1031   • famotidine (PEPCID) injection 20 mg  20 mg Intravenous Q12H " Noreen Aragon MD   20 mg at 04/13/22 0825   • ferric gluconate (FERRLECIT) 125 mg in sodium chloride 0.9 % 110 mL IVPB  125 mg Intravenous Daily Noreen Aragon  mL/hr at 04/13/22 0825 125 mg at 04/13/22 0825   • HYDROmorphone (DILAUDID) injection 0.5 mg  0.5 mg Intravenous Q4H PRN Noreen Aragon MD       • ipratropium-albuterol (DUO-NEB) nebulizer solution 3 mL  3 mL Nebulization Q6H While Awake - RT Noreen Aragon MD   3 mL at 04/13/22 0606   • ketorolac (TORADOL) injection 30 mg  30 mg Intravenous Q6H PRN Noreen Aragon MD       • metoprolol tartrate (LOPRESSOR) injection 5 mg  5 mg Intravenous Q6H Noreen Aragon MD   5 mg at 04/13/22 0826   • Morphine sulfate PCA 1 mg/mL 30 mL syringe   Intravenous Continuous Noreen Aragon MD   Currently Infusing at 04/12/22 0909   • naloxone (NARCAN) injection 0.1 mg  0.1 mg Intravenous Q5 Min PRN Noreen Aragon MD       • ondansetron (ZOFRAN) injection 4 mg  4 mg Intravenous Q4H PRN Noreen Aragon MD            Physician Progress Notes (last 24 hours)      Noreen Aragon MD at 04/13/22 1010          Noreen MCGEE. MD Mahesh FACS  Progress Note     LOS: 5 days   Patient Care Team:  Cristian Burciaga MD as PCP - General (Sports Medicine)      Subjective     Interval History:      no nausea or vomiting.  +flatus +bm  Pain controlled     Objective     Vital Signs  Temp:  [97.7 °F (36.5 °C)-98.4 °F (36.9 °C)] 97.7 °F (36.5 °C)  Heart Rate:  [80-94] 94  Resp:  [16-20] 18  BP: (113-134)/(66-77) 117/66    Physical Exam:  General appearance - alert, well appearing, and in no distress  Abdomen - soft, appropriately tender, still distended, claen      Results Review:    Lab Results (last 24 hours)     Procedure Component Value Units Date/Time    Basic Metabolic Panel [[id number]]  (Abnormal) Collected: 04/13/22 0618    Specimen: Blood Updated: 04/13/22 0714     Glucose 74 mg/dL      BUN 8 mg/dL      Creatinine 0.79 mg/dL      Sodium 136 mmol/L       Potassium 4.1 mmol/L      Chloride 96 mmol/L      CO2 26.0 mmol/L      Calcium 8.7 mg/dL      BUN/Creatinine Ratio 10.1     Anion Gap 14.0 mmol/L      eGFR 109.6 mL/min/1.73      Comment: National Kidney Foundation and American Society of Nephrology (ASN) Task Force recommended calculation based on the Chronic Kidney Disease Epidemiology Collaboration (CKD-EPI) equation refit without adjustment for race.       Narrative:      GFR Normal >60  Chronic Kidney Disease <60  Kidney Failure <15      Magnesium [[id number]]  (Normal) Collected: 04/13/22 0618    Specimen: Blood Updated: 04/13/22 0714     Magnesium 1.6 mg/dL     CBC (No Diff) [[id number]]  (Abnormal) Collected: 04/13/22 0618    Specimen: Blood Updated: 04/13/22 0655     WBC 4.64 10*3/mm3      RBC 3.47 10*6/mm3      Hemoglobin 7.6 g/dL      Hematocrit 26.6 %      MCV 76.7 fL      MCH 21.9 pg      MCHC 28.6 g/dL      RDW 17.0 %      RDW-SD 46.9 fl      MPV 9.8 fL      Platelets 300 10*3/mm3     Tissue Pathology Exam [[id number]] Collected: 04/08/22 0953    Specimen: Tissue from Large Intestine, Sigmoid Colon; Tissue from Large Intestine, Appendix Updated: 04/12/22 1820     Case Report --     Surgical Pathology Report                         Case: [id number]                                  Authorizing Provider:  Noreen Aragon MD       Collected:           04/08/2022 09:53 AM          Ordering Location:     [facility] OR  Received:            04/08/2022 01:19 PM          Pathologist:           Aleena Kaplan MD                                                         Specimens:   1) - Large Intestine, Sigmoid Colon, Sigmoid colon -stitch proximal, intra-abdominal                nodule                                                                                              2) - Large Intestine, Appendix                                                              Final Diagnosis --     1.  Sigmoid colon, sigmoid resection:   -Well to moderately  differentiated colorectal adenocarcinoma (6.3 cm), focally invasive into the visceral peritoneum through area of inflammation.   -Positive for focal lymphvascular invasion.   -Negative for perineural invasion.   -Twenty-two regional lymph nodes, negative for metastatic carcinoma (0/22).   -Incidental prominent mucosal fold with early superficial hyperplastic changes.   -Viable opposing surgical resection margins, negative for tumor.    pTNM CLASSIFICATION:  pT4a pN0 pM (NOT APPLICABLE)    2.  Appendix, appendectomy:   -Appendix with diffuse intraluminal fibrosis and no other significant pathologic abnormalities.   -Negative for malignancy.         Synoptic Checklist --     COLON AND RECTUM: Resection, Including Transanal Disk Excision of Rectal Neoplasms  COLON AND RECTUM: RESECTION - 1  8th Edition - Protocol posted: 12/17/2021    SPECIMEN     Procedure:    Sigmoidectomy     TUMOR     Tumor Site:    Sigmoid colon      Histologic Type:    Adenocarcinoma      Histologic Grade:    G2, moderately differentiated      Tumor Size:    Greatest dimension (Centimeters): 6.5 cm     Tumor Extent:    Invades visceral peritoneum      Macroscopic Tumor Perforation:    Not identified      Lymphovascular Invasion:    Present      Perineural Invasion:    Not identified      Type of Polyp in which Invasive Carcinoma Arose:    None identified      Treatment Effect:    No known presurgical therapy     MARGINS     Margin Status for Invasive Carcinoma:    All margins negative for invasive carcinoma        Closest Margin(s) to Invasive Carcinoma:    Proximal        Distance from Invasive Carcinoma to Closest Margin:    1.8 cm     Margin Status for Non-Invasive Tumor:    All margins negative for high-grade dysplasia / intramucosal carcinoma and low-grade dysplasia     REGIONAL LYMPH NODES     Regional Lymph Node Status:           :    All regional lymph nodes negative for tumor        Number of Lymph Nodes Examined:    22      Tumor  "Deposits:    Not identified     DISTANT METASTASIS    PATHOLOGIC STAGE CLASSIFICATION (pTNM, AJCC 8th Edition)     Reporting of pT, pN, and (when applicable) pM categories is based on information available to the pathologist at the time the report is issued. As per the AJCC (Chapter 1, 8th Ed.) it is the managing physician’s responsibility to establish the final pathologic stage based upon all pertinent information, including but potentially not limited to this pathology report.     pT Category:    pT4a      pN Category:    pN0     ADDITIONAL FINDINGS     Additional Findings:    Prominent mucosal fold with early superficial hyperplastic changes        Comment(s):    Additional studies can be performed upon request by the treating oncologist.  Block 1E would be appropriate.        Gross Description --     1. Large Intestine, Sigmoid Colon.  Specimen #1 received in a formalin filled container labeled with the patient's name, date of birth, and \"sigmoid colon\".  The specimen consists of a segment of colon measuring 19.4 cm in length by 5.6 cm in diameter.  The surface of the colon is tan-pink with black tattoo pigment centrally located.  An area of tightly adhered mesocolon is noted with roughened surface.  The resection margins are stapled and the proximal margin is marked with a suture.  The specimen is opened and allowed to fix over the weekend and 10% formalin.  Upon opening a circumferential yellow-tan mass is identified measuring 6.3 cm proximal distal by 5.4 cm in diameter.  The mass measures 1.8 cm from the proximal, sutured margin and 8.3 cm from the distal resection margin.  The soft tissue/serosa surrounding the mass is inked blue.  Sectioning shows extension deep into the surrounding soft tissue and to within less than 0.1 cm of the ragged surface on the circumferential serosal surface.  The remaining mucosa is examined and reveals a sessile polyp 0.3 cm distal to the mass and measuring 0.8 x 0.5 cm.  A " "section through the area shows a raised fold with possible tattoo pigment and no significant mucosal thickening.  No additional lesions are identified.     Sectioning through the surrounding soft tissue reveals multiple lymph node candidates measuring up to 0.9 cm.  Some of these lymph node candidates have gray-white, friable cut surfaces the cut surface of the remaining lymph node candidates are tan-pink and some black tattoo pigment.  Many of the lymph node candidates are noted on the edge of the mass and may have direct extension of tumor.  A soft fairly well-circumscribed yellow-tan nodule is present measuring 0.9 x 0.8 x 0.8 cm.  The cut surface is yellow-pink and fatty with no focal areas of firmness identified.  1A-sutured, proximal resection margin  1B and 1C-distal resection margin  1D-representative section of the mass and ragged circumferential/serosal surface  1E through 1H-representative sections of mass  1I-raised mucosal fold versus polyp  1J-representative section of remaining, unremarkable colon wall  1K-sectioned lymph node candidate   1L-sectioned lymph node candidate  1M-2 bisected lymph node candidates  1N-trisected cluster of 2 lymph node candidates  1O-2 bisected lymph node candidates  1P-trisected lymph node candidate  1Q-2 bisected lymph node candidates  1R-1 bisected lymph node candidate  1S-5 intact lymph node candidates  1T-4 intact lymph node candidates      2. Large Intestine, Appendix.  Specimen #2 received in the same formalin filled container labeled with the patient's name, date of birth, and \"appendix\".  The specimen consists of an appendix with attached mesoappendix.  The appendix measures 4.1 cm in length by 0.5 cm in diameter.  The serosal surface is yellow-tan and dusky.  The serosa at the stapled resection margin is inked black.  Sectioning reveals a narrowed lumen and patency cannot be definitively determined.  A representative section of distal tip, mid appendix, and the " stapled resection margin are submitted in block 2A.            Imaging Results (Last 24 Hours)     ** No results found for the last 24 hours. **            Assessment/Plan       POD# 5 sigmoidectomy, appy    Allow full bowel function return  Will hold lovenox as H/H continues to drift downward        Noreen Aragon MD  04/13/22  10:10 CDT        Electronically signed by Noreen Aragon MD at 04/13/22 1017

## 2022-04-14 LAB
ANION GAP SERPL CALCULATED.3IONS-SCNC: 15 MMOL/L (ref 5–15)
ANION GAP SERPL CALCULATED.3IONS-SCNC: NORMAL MMOL/L
BUN SERPL-MCNC: 4 MG/DL (ref 6–20)
BUN SERPL-MCNC: NORMAL MG/DL
BUN/CREAT SERPL: 4.9 (ref 7–25)
BUN/CREAT SERPL: NORMAL
CALCIUM SPEC-SCNC: 8.6 MG/DL (ref 8.6–10.5)
CALCIUM SPEC-SCNC: NORMAL MMOL/L
CHLORIDE SERPL-SCNC: 99 MMOL/L (ref 98–107)
CHLORIDE SERPL-SCNC: NORMAL MMOL/L
CO2 SERPL-SCNC: 23 MMOL/L (ref 22–29)
CO2 SERPL-SCNC: NORMAL MMOL/L
CREAT SERPL-MCNC: 0.82 MG/DL (ref 0.76–1.27)
CREAT SERPL-MCNC: NORMAL MG/DL
DEPRECATED RDW RBC AUTO: 46.2 FL (ref 37–54)
DEPRECATED RDW RBC AUTO: NORMAL FL
EGFRCR SERPLBLD CKD-EPI 2021: 108.4 ML/MIN/1.73
EGFRCR SERPLBLD CKD-EPI 2021: NORMAL ML/MIN/{1.73_M2}
ERYTHROCYTE [DISTWIDTH] IN BLOOD BY AUTOMATED COUNT: 17 % (ref 12.3–15.4)
ERYTHROCYTE [DISTWIDTH] IN BLOOD BY AUTOMATED COUNT: NORMAL %
GLUCOSE BLDC GLUCOMTR-MCNC: 100 MG/DL (ref 70–130)
GLUCOSE BLDC GLUCOMTR-MCNC: 102 MG/DL (ref 70–130)
GLUCOSE BLDC GLUCOMTR-MCNC: 110 MG/DL (ref 70–130)
GLUCOSE BLDC GLUCOMTR-MCNC: 119 MG/DL (ref 70–130)
GLUCOSE SERPL-MCNC: 93 MG/DL (ref 65–99)
GLUCOSE SERPL-MCNC: NORMAL MG/DL
HCT VFR BLD AUTO: 28.6 % (ref 37.5–51)
HCT VFR BLD AUTO: NORMAL %
HGB BLD-MCNC: 8.1 G/DL (ref 13–17.7)
HGB BLD-MCNC: NORMAL G/DL
MCH RBC QN AUTO: 21.7 PG (ref 26.6–33)
MCH RBC QN AUTO: NORMAL PG
MCHC RBC AUTO-ENTMCNC: 28.3 G/DL (ref 31.5–35.7)
MCHC RBC AUTO-ENTMCNC: NORMAL G/DL
MCV RBC AUTO: 76.5 FL (ref 79–97)
MCV RBC AUTO: NORMAL FL
PLATELET # BLD AUTO: 287 10*3/MM3 (ref 140–450)
PLATELET # BLD AUTO: NORMAL 10*3/UL
PMV BLD AUTO: 9.7 FL (ref 6–12)
PMV BLD AUTO: NORMAL FL
POTASSIUM SERPL-SCNC: 3.9 MMOL/L (ref 3.5–5.2)
POTASSIUM SERPL-SCNC: NORMAL MMOL/L
RBC # BLD AUTO: 3.74 10*6/MM3 (ref 4.14–5.8)
RBC # BLD AUTO: NORMAL 10*6/UL
SODIUM SERPL-SCNC: 137 MMOL/L (ref 136–145)
SODIUM SERPL-SCNC: NORMAL MMOL/L
WBC NRBC COR # BLD: 4.2 10*3/MM3 (ref 3.4–10.8)
WBC NRBC COR # BLD: NORMAL 10*3/UL

## 2022-04-14 PROCEDURE — 25010000002 NA FERRIC GLUC CPLX PER 12.5 MG: Performed by: SPECIALIST

## 2022-04-14 PROCEDURE — 80048 BASIC METABOLIC PNL TOTAL CA: CPT | Performed by: SPECIALIST

## 2022-04-14 PROCEDURE — 94799 UNLISTED PULMONARY SVC/PX: CPT

## 2022-04-14 PROCEDURE — 82962 GLUCOSE BLOOD TEST: CPT

## 2022-04-14 PROCEDURE — 94664 DEMO&/EVAL PT USE INHALER: CPT

## 2022-04-14 PROCEDURE — 85027 COMPLETE CBC AUTOMATED: CPT | Performed by: SPECIALIST

## 2022-04-14 RX ORDER — NICOTINE POLACRILEX 4 MG
15 LOZENGE BUCCAL
Status: DISCONTINUED | OUTPATIENT
Start: 2022-04-14 | End: 2022-04-15 | Stop reason: HOSPADM

## 2022-04-14 RX ORDER — DEXTROSE MONOHYDRATE 25 G/50ML
25 INJECTION, SOLUTION INTRAVENOUS
Status: DISCONTINUED | OUTPATIENT
Start: 2022-04-14 | End: 2022-04-15 | Stop reason: HOSPADM

## 2022-04-14 RX ADMIN — FAMOTIDINE 20 MG: 10 INJECTION INTRAVENOUS at 21:16

## 2022-04-14 RX ADMIN — IPRATROPIUM BROMIDE AND ALBUTEROL SULFATE 3 ML: .5; 3 SOLUTION RESPIRATORY (INHALATION) at 13:59

## 2022-04-14 RX ADMIN — POTASSIUM CHLORIDE, DEXTROSE MONOHYDRATE AND SODIUM CHLORIDE 75 ML/HR: 75; 5; 450 INJECTION, SOLUTION INTRAVENOUS at 06:58

## 2022-04-14 RX ADMIN — FAMOTIDINE 20 MG: 10 INJECTION INTRAVENOUS at 08:43

## 2022-04-14 RX ADMIN — SODIUM CHLORIDE 125 MG: 9 INJECTION, SOLUTION INTRAVENOUS at 08:43

## 2022-04-14 RX ADMIN — METOPROLOL TARTRATE 5 MG: 1 INJECTION, SOLUTION INTRAVENOUS at 15:09

## 2022-04-14 RX ADMIN — IPRATROPIUM BROMIDE AND ALBUTEROL SULFATE 3 ML: .5; 3 SOLUTION RESPIRATORY (INHALATION) at 20:00

## 2022-04-14 RX ADMIN — METOPROLOL TARTRATE 5 MG: 1 INJECTION, SOLUTION INTRAVENOUS at 08:47

## 2022-04-14 RX ADMIN — METOPROLOL TARTRATE 5 MG: 1 INJECTION, SOLUTION INTRAVENOUS at 04:26

## 2022-04-14 RX ADMIN — IPRATROPIUM BROMIDE AND ALBUTEROL SULFATE 3 ML: .5; 3 SOLUTION RESPIRATORY (INHALATION) at 06:03

## 2022-04-14 NOTE — PLAN OF CARE
Goal Outcome Evaluation:           Progress: improving  Outcome Evaluation: Pt's abd is softer and is passing flatus. No change in diet, continues on full liquid. Continues to use PCA pump for pain control. Ambulated in quick and up in chair throughout the shift.

## 2022-04-14 NOTE — PROGRESS NOTES
Noreen Aragon MD FACS  Progress Note     LOS: 6 days   Patient Care Team:  Cristian Burciaga MD as PCP - General (Sports Medicine)      Subjective     Interval History:      +flatus and BM's.  [place]ville fulls     Objective     Vital Signs  Temp:  [97.7 °F (36.5 °C)-98.7 °F (37.1 °C)] 98.6 °F (37 °C)  Heart Rate:  [84-99] 88  Resp:  [16-20] 16  BP: (118-136)/(64-77) 127/64    Physical Exam:  General appearance - alert, well appearing, and in no distress  Abdomen - softer, decreased distension, clean      Results Review:    Lab Results (last 24 hours)     Procedure Component Value Units Date/Time    POC Glucose Once [[id number]]  (Normal) Collected: 04/14/22 1700    Specimen: Blood Updated: 04/14/22 1710     Glucose 119 mg/dL      Comment: : [id number] [employer]  LMeter ID: [id number]       POC Glucose Once [[id number]]  (Normal) Collected: 04/14/22 1143    Specimen: Blood Updated: 04/14/22 1154     Glucose 102 mg/dL      Comment: : [id number] [employer]  LMeter ID: [id number]       CBC (No Diff) [[id number]] Collected: 04/14/22 0503    Specimen: Blood Updated: 04/14/22 0814     WBC --     Comment: Questionable result, new specimen requested.  Corrected result. Previous result was 3.88 10*3/mm3 on 4/14/2022 at 0557 CDT.        RBC --     Comment: Questionable result, new specimen requested.  Corrected result. Previous result was 3.33 10*6/mm3 on 4/14/2022 at 0557 CDT.        Hemoglobin --     Comment: Questionable result, new specimen requested.  Corrected result. Previous result was 7.1 g/dL on 4/14/2022 at 0557 CDT.        Hematocrit --     Comment: Questionable result, new specimen requested.  Corrected result. Previous result was 25.6 % on 4/14/2022 at 0557 CDT.        MCV --     Comment: Questionable result, new specimen requested.  Corrected result. Previous result was 76.9 fL on 4/14/2022 at 0557 CDT.        MCH --     Comment: Questionable result, new specimen requested.  Corrected result.  Previous result was 21.3 pg on 4/14/2022 at [address] [place] [address].        MCHC --     Comment: Questionable result, new specimen requested.  Corrected result. Previous result was 27.7 g/dL on 4/14/2022 at [address] [place] [address].        RDW --     Comment: Questionable result, new specimen requested.  Corrected result. Previous result was 17.2 % on 4/14/2022 at [address] [place] [address].        RDW-SD --     Comment: Questionable result, new specimen requested.  Corrected result. Previous result was 46.8 fl on 4/14/2022 at [address] [place] [address].        MPV --     Comment: Questionable result, new specimen requested.  Corrected result. Previous result was 10.2 fL on 4/14/2022 at [address] [place] [address].        Platelets --     Comment: Questionable result, new specimen requested.  Corrected result. Previous result was 280 10*3/mm3 on 4/14/2022 at [address] [place] [address].       Basic Metabolic Panel [[id number]] Collected: 04/14/22 0503    Specimen: Blood Updated: 04/14/22 0811     Glucose --     Comment: Questionable result, new specimen requested.  Corrected result. Previous result was 615 mg/dL on 4/14/2022 at [address] [place] [address].        BUN --     Comment: Questionable result, new specimen requested.  Corrected result. Previous result was 5 mg/dL on 4/14/2022 at [address] [place] [address].        Creatinine --     Comment: Questionable result, new specimen requested.  Corrected result. Previous result was 0.72 mg/dL on 4/14/2022 at [address] [place] [address].        Sodium --     Comment: Questionable result, new specimen requested.  Corrected result. Previous result was 130 mmol/L on 4/14/2022 at [address] [place] [address].        Potassium --     Comment: Questionable result, new specimen requested.  Corrected result. Previous result was 4.9 mmol/L on 4/14/2022 at [address] [place] [address].        Chloride --     Comment: Questionable result, new specimen requested.  Corrected result. Previous result was 98 mmol/L on 4/14/2022 at [address] [place] [address].        CO2 --     Comment: Questionable result, new specimen requested.  Corrected result. Previous result was 22.0 mmol/L on 4/14/2022 at  06 CDT.        Calcium --     Comment: Questionable result, new specimen requested.  Corrected result. Previous result was 7.4 mg/dL on 4/14/2022 at [facility] CDT.        BUN/Creatinine Ratio --     Comment: Questionable result, new specimen requested.  Corrected result. Previous result was 6.9 on 4/14/2022 at [address] [place], [place] [address]T.        Anion Gap --     Comment: Questionable result, new specimen requested.  Corrected result. Previous result was 10.0 mmol/L on 4/14/2022 at [address] [place], [place] [address]T.        eGFR --     Comment: National Kidney Foundation and American Society of Nephrology (ASN) Task Force recommended calculation based on the Chronic Kidney Disease Epidemiology Collaboration (CKD-EPI) equation refit without adjustment for race.  Corrected result. Previous result was 112.7 mL/min/1.73 on 4/14/2022 at [address] [place], [place] [address]T.       Narrative:      GFR Normal >60  Chronic Kidney Disease <60  Kidney Failure <15      Basic Metabolic Panel [[id number]]  (Abnormal) Collected: 04/14/22 0705    Specimen: Blood Updated: 04/14/22 0753     Glucose 93 mg/dL      BUN 4 mg/dL      Creatinine 0.82 mg/dL      Sodium 137 mmol/L      Potassium 3.9 mmol/L      Chloride 99 mmol/L      CO2 23.0 mmol/L      Calcium 8.6 mg/dL      BUN/Creatinine Ratio 4.9     Anion Gap 15.0 mmol/L      eGFR 108.4 mL/min/1.73      Comment: National Kidney Foundation and American Society of Nephrology (ASN) Task Force recommended calculation based on the Chronic Kidney Disease Epidemiology Collaboration (CKD-EPI) equation refit without adjustment for race.       Narrative:      GFR Normal >60  Chronic Kidney Disease <60  Kidney Failure <15      CBC (No Diff) [[id number]]  (Abnormal) Collected: 04/14/22 0705    Specimen: Blood Updated: 04/14/22 0733     WBC 4.20 10*3/mm3      RBC 3.74 10*6/mm3      Hemoglobin 8.1 g/dL      Hematocrit 28.6 %      MCV 76.5 fL      MCH 21.7 pg      MCHC 28.3 g/dL      RDW 17.0 %      RDW-SD 46.2 fl      MPV 9.7 fL      Platelets 287 10*3/mm3     POC Glucose  Once [[id number]]  (Normal) Collected: 04/14/22 0647    Specimen: Blood Updated: 04/14/22 0711     Glucose 100 mg/dL      Comment: : [id number] [address] Jeremíasnubia ID: [id number]           Imaging Results (Last 24 Hours)     ** No results found for the last 24 hours. **            Assessment/Plan       POD# 6 sigmoidectomy, appy    Allow bowel function to continue to return.      Noreen Aragon MD  04/14/22  17:23 CDT

## 2022-04-14 NOTE — PLAN OF CARE
Goal Outcome Evaluation:  Plan of Care Reviewed With: patient           Outcome Evaluation: Pt c/o pain in abdomen, pain pump in use and pt refused interventions. No c/o nausea. IVF. Pt on CPAP at night. Midline c/d/i with abdominal binder in use. SCDs on. Pt reported one BM this shift. VSS.

## 2022-04-14 NOTE — PAYOR COMM NOTE
"Alicia Macedo (46 y.o. Male) [id number]   Cont stay  Please review clinical  For additional days   [facility] phone   Fax                Date of Birth   1975    Social Security Number       Address   [address] [facility] [address]    Home Phone   [phone]    MRN   [id number]       Mandaeism   Other    Marital Status                               Admission Date   4/8/22    Admission Type   Elective    Admitting Provider   Noreen Aragon MD    Attending Provider   Noreen Aragon MD    Department, Room/Bed   [facility] 3C, 380/1       Discharge Date       Discharge Disposition       Discharge Destination                               Attending Provider: Noreen Aragon MD    Allergies: No Known Allergies    Isolation: None   Infection: None   Code Status: CPR   Advance Care Planning Activity    Ht: 153 cm (60.24\")   Wt: 98 kg (216 lb 0.8 oz)    Admission Cmt: None   Principal Problem: None                Active Insurance as of 4/8/2022     Primary Coverage     Payor Plan Insurance Group Employer/Plan Group    [employer] PPO [id number]     Payor Plan Address Payor Plan Phone Number Payor Plan Fax Number Effective Dates    [address] [phone]  1/1/2021 - None Entered    [place] [address]       Subscriber Name Subscriber Birth Date Member ID       ALICIA MACEDO 1975 [id number]                 Emergency Contacts      (Rel.) Home Phone Work Phone Mobile Phone    SHAHNAZ MACEDO (Spouse) -- -- [phone]              Current Facility-Administered Medications   Medication Dose Route Frequency Provider Last Rate Last Admin   • dextrose (D50W) (25 g/50 mL) IV injection 25 g  25 g Intravenous Q15 Min PRN Noreen Aragon MD       • dextrose (GLUTOSE) oral gel 15 g  15 g Oral Q15 Min PRN Noreen Aragon MD       • dextrose 5 % and sodium chloride 0.45 % with " KCl 10 mEq/L infusion  75 mL/hr Intravenous Continuous Noreen Aragon MD 75 mL/hr at 04/14/22 0658 75 mL/hr at 04/14/22 0658   • famotidine (PEPCID) injection 20 mg  20 mg Intravenous Q12H Noreen Aragon MD   20 mg at 04/14/22 0843   • glucagon (human recombinant) (GLUCAGEN DIAGNOSTIC) injection 1 mg  1 mg Intramuscular Q15 Min PRN Noreen Aragon MD       • HYDROmorphone (DILAUDID) injection 0.5 mg  0.5 mg Intravenous Q4H PRN Noreen Aragon MD       • insulin lispro (humaLOG) injection 0-9 Units  0-9 Units Subcutaneous Q6H Noreen Aragon MD       • ipratropium-albuterol (DUO-NEB) nebulizer solution 3 mL  3 mL Nebulization Q6H While Awake - RT Noreen Aragon MD   3 mL at 04/14/22 1359   • ketorolac (TORADOL) injection 30 mg  30 mg Intravenous Q6H PRN Noreen Aragon MD       • metoprolol tartrate (LOPRESSOR) injection 5 mg  5 mg Intravenous Q6H Noreen Aragon MD   5 mg at 04/14/22 0847   • Morphine sulfate PCA 1 mg/mL 30 mL syringe   Intravenous Continuous Noreen Aragon MD   New Syringe/Cartridge at 04/13/22 2006   • naloxone (NARCAN) injection 0.1 mg  0.1 mg Intravenous Q5 Min PRN Noreen Aragon MD       • ondansetron (ZOFRAN) injection 4 mg  4 mg Intravenous Q4H PRN Noreen Aragon MD            Physician Progress Notes (last 48 hours)      Noreen Aragon MD at 04/13/22 1010          Noreen MCGEE. MD Mahesh FACS  Progress Note     LOS: 5 days   Patient Care Team:  Cristian Burciaga MD as PCP - General (Sports Medicine)      Subjective     Interval History:      no nausea or vomiting.  +flatus +bm  Pain controlled     Objective     Vital Signs  Temp:  [97.7 °F (36.5 °C)-98.4 °F (36.9 °C)] 97.7 °F (36.5 °C)  Heart Rate:  [80-94] 94  Resp:  [16-20] 18  BP: (113-134)/(66-77) 117/66    Physical Exam:  General appearance - alert, well appearing, and in no distress  Abdomen - soft, appropriately tender, still distended, claen      Results Review:    Lab Results (last 24 hours)      Procedure Component Value Units Date/Time    Basic Metabolic Panel [[id number]]  (Abnormal) Collected: 04/13/22 0618    Specimen: Blood Updated: 04/13/22 0714     Glucose 74 mg/dL      BUN 8 mg/dL      Creatinine 0.79 mg/dL      Sodium 136 mmol/L      Potassium 4.1 mmol/L      Chloride 96 mmol/L      CO2 26.0 mmol/L      Calcium 8.7 mg/dL      BUN/Creatinine Ratio 10.1     Anion Gap 14.0 mmol/L      eGFR 109.6 mL/min/1.73      Comment: National Kidney Foundation and American Society of Nephrology (ASN) Task Force recommended calculation based on the Chronic Kidney Disease Epidemiology Collaboration (CKD-EPI) equation refit without adjustment for race.       Narrative:      GFR Normal >60  Chronic Kidney Disease <60  Kidney Failure <15      Magnesium [[id number]]  (Normal) Collected: 04/13/22 0618    Specimen: Blood Updated: 04/13/22 0714     Magnesium 1.6 mg/dL     CBC (No Diff) [[id number]]  (Abnormal) Collected: 04/13/22 0618    Specimen: Blood Updated: 04/13/22 0655     WBC 4.64 10*3/mm3      RBC 3.47 10*6/mm3      Hemoglobin 7.6 g/dL      Hematocrit 26.6 %      MCV 76.7 fL      MCH 21.9 pg      MCHC 28.6 g/dL      RDW 17.0 %      RDW-SD 46.9 fl      MPV 9.8 fL      Platelets 300 10*3/mm3     Tissue Pathology Exam [[id number]] Collected: 04/08/22 0953    Specimen: Tissue from Large Intestine, Sigmoid Colon; Tissue from Large Intestine, Appendix Updated: 04/12/22 1820     Case Report --     Surgical Pathology Report                         Case: [id number]                                  Authorizing Provider:  Noreen Aragon MD       Collected:           04/08/2022 09:53 AM          Ordering Location:     [facility] OR  Received:            04/08/2022 01:19 PM          Pathologist:           Aleena Kaplan MD                                                         Specimens:   1) - Large Intestine, Sigmoid Colon, Sigmoid colon -stitch proximal, intra-abdominal                nodule                                                                                               2) - Large Intestine, Appendix                                                              Final Diagnosis --     1.  Sigmoid colon, sigmoid resection:   -Well to moderately differentiated colorectal adenocarcinoma (6.3 cm), focally invasive into the visceral peritoneum through area of inflammation.   -Positive for focal lymphvascular invasion.   -Negative for perineural invasion.   -Twenty-two regional lymph nodes, negative for metastatic carcinoma (0/22).   -Incidental prominent mucosal fold with early superficial hyperplastic changes.   -Viable opposing surgical resection margins, negative for tumor.    pTNM CLASSIFICATION:  pT4a pN0 pM (NOT APPLICABLE)    2.  Appendix, appendectomy:   -Appendix with diffuse intraluminal fibrosis and no other significant pathologic abnormalities.   -Negative for malignancy.         Synoptic Checklist --     COLON AND RECTUM: Resection, Including Transanal Disk Excision of Rectal Neoplasms  COLON AND RECTUM: RESECTION - 1  8th Edition - Protocol posted: 12/17/2021    SPECIMEN     Procedure:    Sigmoidectomy     TUMOR     Tumor Site:    Sigmoid colon      Histologic Type:    Adenocarcinoma      Histologic Grade:    G2, moderately differentiated      Tumor Size:    Greatest dimension (Centimeters): 6.5 cm     Tumor Extent:    Invades visceral peritoneum      Macroscopic Tumor Perforation:    Not identified      Lymphovascular Invasion:    Present      Perineural Invasion:    Not identified      Type of Polyp in which Invasive Carcinoma Arose:    None identified      Treatment Effect:    No known presurgical therapy     MARGINS     Margin Status for Invasive Carcinoma:    All margins negative for invasive carcinoma        Closest Margin(s) to Invasive Carcinoma:    Proximal        Distance from Invasive Carcinoma to Closest Margin:    1.8 cm     Margin Status for Non-Invasive Tumor:    All margins negative  "for high-grade dysplasia / intramucosal carcinoma and low-grade dysplasia     REGIONAL LYMPH NODES     Regional Lymph Node Status:           :    All regional lymph nodes negative for tumor        Number of Lymph Nodes Examined:    22      Tumor Deposits:    Not identified     DISTANT METASTASIS    PATHOLOGIC STAGE CLASSIFICATION (pTNM, AJCC 8th Edition)     Reporting of pT, pN, and (when applicable) pM categories is based on information available to the pathologist at the time the report is issued. As per the AJCC (Chapter 1, 8th Ed.) it is the managing physician’s responsibility to establish the final pathologic stage based upon all pertinent information, including but potentially not limited to this pathology report.     pT Category:    pT4a      pN Category:    pN0     ADDITIONAL FINDINGS     Additional Findings:    Prominent mucosal fold with early superficial hyperplastic changes        Comment(s):    Additional studies can be performed upon request by the treating oncologist.  Block 1E would be appropriate.        Gross Description --     1. Large Intestine, Sigmoid Colon.  Specimen #1 received in a formalin filled container labeled with the patient's name, date of birth, and \"sigmoid colon\".  The specimen consists of a segment of colon measuring 19.4 cm in length by 5.6 cm in diameter.  The surface of the colon is tan-pink with black tattoo pigment centrally located.  An area of tightly adhered mesocolon is noted with roughened surface.  The resection margins are stapled and the proximal margin is marked with a suture.  The specimen is opened and allowed to fix over the weekend and 10% formalin.  Upon opening a circumferential yellow-tan mass is identified measuring 6.3 cm proximal distal by 5.4 cm in diameter.  The mass measures 1.8 cm from the proximal, sutured margin and 8.3 cm from the distal resection margin.  The soft tissue/serosa surrounding the mass is inked blue.  Sectioning shows extension deep " "into the surrounding soft tissue and to within less than 0.1 cm of the ragged surface on the circumferential serosal surface.  The remaining mucosa is examined and reveals a sessile polyp 0.3 cm distal to the mass and measuring 0.8 x 0.5 cm.  A section through the area shows a raised fold with possible tattoo pigment and no significant mucosal thickening.  No additional lesions are identified.     Sectioning through the surrounding soft tissue reveals multiple lymph node candidates measuring up to 0.9 cm.  Some of these lymph node candidates have gray-white, friable cut surfaces the cut surface of the remaining lymph node candidates are tan-pink and some black tattoo pigment.  Many of the lymph node candidates are noted on the edge of the mass and may have direct extension of tumor.  A soft fairly well-circumscribed yellow-tan nodule is present measuring 0.9 x 0.8 x 0.8 cm.  The cut surface is yellow-pink and fatty with no focal areas of firmness identified.  1A-sutured, proximal resection margin  1B and 1C-distal resection margin  1D-representative section of the mass and ragged circumferential/serosal surface  1E through 1H-representative sections of mass  1I-raised mucosal fold versus polyp  1J-representative section of remaining, unremarkable colon wall  1K-sectioned lymph node candidate   1L-sectioned lymph node candidate  1M-2 bisected lymph node candidates  1N-trisected cluster of 2 lymph node candidates  1O-2 bisected lymph node candidates  1P-trisected lymph node candidate  1Q-2 bisected lymph node candidates  1R-1 bisected lymph node candidate  1S-5 intact lymph node candidates  1T-4 intact lymph node candidates      2. Large Intestine, Appendix.  Specimen #2 received in the same formalin filled container labeled with the patient's name, date of birth, and \"appendix\".  The specimen consists of an appendix with attached mesoappendix.  The appendix measures 4.1 cm in length by 0.5 cm in diameter.  The " serosal surface is yellow-tan and dusky.  The serosa at the stapled resection margin is inked black.  Sectioning reveals a narrowed lumen and patency cannot be definitively determined.  A representative section of distal tip, mid appendix, and the stapled resection margin are submitted in block 2A.            Imaging Results (Last 24 Hours)     ** No results found for the last 24 hours. **            Assessment/Plan       POD# 5 sigmoidectomy, appy    Allow full bowel function return  Will hold lovenox as H/H continues to drift downward        Noreen Aragon MD  04/13/22  10:10 CDT        Electronically signed by Noreen Aragon MD at 04/13/22 1013          4/13 nurse note :   Pt on 2L NC. PCA pump   4/14 No change in diet, continues on full liquid. Continues to use PCA pump for pain control. Ambulated in quick and up in chair throughout the shift.    Cont on toradol iv q6    ivfl d5 1/2 with 10 kcl  At 75  Hr   o2 2lit n/c

## 2022-04-15 ENCOUNTER — READMISSION MANAGEMENT (OUTPATIENT)
Dept: CALL CENTER | Facility: HOSPITAL | Age: 47
End: 2022-04-15

## 2022-04-15 VITALS
HEIGHT: 60 IN | WEIGHT: 216.05 LBS | SYSTOLIC BLOOD PRESSURE: 123 MMHG | TEMPERATURE: 98.5 F | DIASTOLIC BLOOD PRESSURE: 76 MMHG | HEART RATE: 90 BPM | OXYGEN SATURATION: 94 % | RESPIRATION RATE: 16 BRPM | BODY MASS INDEX: 42.42 KG/M2

## 2022-04-15 LAB
ANION GAP SERPL CALCULATED.3IONS-SCNC: 10 MMOL/L (ref 5–15)
BUN SERPL-MCNC: 2 MG/DL (ref 6–20)
BUN/CREAT SERPL: 2.6 (ref 7–25)
CALCIUM SPEC-SCNC: 8.8 MG/DL (ref 8.6–10.5)
CHLORIDE SERPL-SCNC: 100 MMOL/L (ref 98–107)
CO2 SERPL-SCNC: 27 MMOL/L (ref 22–29)
CREAT SERPL-MCNC: 0.76 MG/DL (ref 0.76–1.27)
DEPRECATED RDW RBC AUTO: 46.4 FL (ref 37–54)
EGFRCR SERPLBLD CKD-EPI 2021: 110.9 ML/MIN/1.73
ERYTHROCYTE [DISTWIDTH] IN BLOOD BY AUTOMATED COUNT: 18.4 % (ref 12.3–15.4)
GLUCOSE BLDC GLUCOMTR-MCNC: 100 MG/DL (ref 70–130)
GLUCOSE BLDC GLUCOMTR-MCNC: 103 MG/DL (ref 70–130)
GLUCOSE SERPL-MCNC: 106 MG/DL (ref 65–99)
HCT VFR BLD AUTO: 28.5 % (ref 37.5–51)
HGB BLD-MCNC: 8.2 G/DL (ref 13–17.7)
MCH RBC QN AUTO: 21.9 PG (ref 26.6–33)
MCHC RBC AUTO-ENTMCNC: 28.8 G/DL (ref 31.5–35.7)
MCV RBC AUTO: 76.2 FL (ref 79–97)
PLATELET # BLD AUTO: 290 10*3/MM3 (ref 140–450)
PMV BLD AUTO: 10.2 FL (ref 6–12)
POTASSIUM SERPL-SCNC: 3.9 MMOL/L (ref 3.5–5.2)
RBC # BLD AUTO: 3.74 10*6/MM3 (ref 4.14–5.8)
SODIUM SERPL-SCNC: 137 MMOL/L (ref 136–145)
WBC NRBC COR # BLD: 4.9 10*3/MM3 (ref 3.4–10.8)

## 2022-04-15 PROCEDURE — 94799 UNLISTED PULMONARY SVC/PX: CPT

## 2022-04-15 PROCEDURE — 82962 GLUCOSE BLOOD TEST: CPT

## 2022-04-15 PROCEDURE — 25010000002 KETOROLAC TROMETHAMINE PER 15 MG: Performed by: SPECIALIST

## 2022-04-15 PROCEDURE — 94761 N-INVAS EAR/PLS OXIMETRY MLT: CPT

## 2022-04-15 PROCEDURE — 80048 BASIC METABOLIC PNL TOTAL CA: CPT | Performed by: SPECIALIST

## 2022-04-15 PROCEDURE — 85027 COMPLETE CBC AUTOMATED: CPT | Performed by: SPECIALIST

## 2022-04-15 RX ORDER — OXYCODONE HYDROCHLORIDE AND ACETAMINOPHEN 5; 325 MG/1; MG/1
1 TABLET ORAL EVERY 4 HOURS PRN
Status: DISCONTINUED | OUTPATIENT
Start: 2022-04-15 | End: 2022-04-15 | Stop reason: HOSPADM

## 2022-04-15 RX ORDER — OXYCODONE HYDROCHLORIDE AND ACETAMINOPHEN 5; 325 MG/1; MG/1
1 TABLET ORAL EVERY 4 HOURS PRN
Qty: 30 TABLET | Refills: 0 | Status: SHIPPED | OUTPATIENT
Start: 2022-04-15 | End: 2022-04-22

## 2022-04-15 RX ADMIN — POTASSIUM CHLORIDE, DEXTROSE MONOHYDRATE AND SODIUM CHLORIDE 75 ML/HR: 75; 5; 450 INJECTION, SOLUTION INTRAVENOUS at 10:47

## 2022-04-15 RX ADMIN — METOPROLOL TARTRATE 5 MG: 1 INJECTION, SOLUTION INTRAVENOUS at 03:03

## 2022-04-15 RX ADMIN — IPRATROPIUM BROMIDE AND ALBUTEROL SULFATE 3 ML: .5; 3 SOLUTION RESPIRATORY (INHALATION) at 06:58

## 2022-04-15 RX ADMIN — IPRATROPIUM BROMIDE AND ALBUTEROL SULFATE 3 ML: .5; 3 SOLUTION RESPIRATORY (INHALATION) at 13:02

## 2022-04-15 RX ADMIN — METOPROLOL TARTRATE 5 MG: 1 INJECTION, SOLUTION INTRAVENOUS at 09:46

## 2022-04-15 RX ADMIN — FAMOTIDINE 20 MG: 10 INJECTION INTRAVENOUS at 08:02

## 2022-04-15 RX ADMIN — KETOROLAC TROMETHAMINE 30 MG: 30 INJECTION, SOLUTION INTRAMUSCULAR; INTRAVENOUS at 08:02

## 2022-04-15 NOTE — DISCHARGE SUMMARY
Consults     Date and Time Order Name Status Description    4/12/2022  8:14 AM Inpatient Internal Medicine Consult        Noreen Aragon MD FACS Discharge Summary    Date of Discharge:  4/15/2022    Discharge Diagnosis: colon cancer    Presenting Problem/History of Present Illness  Colon cancer (HCC) [C18.9]     Hospital Course  Patient is a 46 y.o. male presented with known diagnosis of sigmoid adenocarcinoma.  He underwent open sigmoidectomy with appendectomy.  His diet was advanced as bowel function returned.  His prednisone as held and resumed at time of discharge.  He was given a script for percocet..      Procedures Performed  Procedure(s):  HAND ASSISTED LAPAROSCOPIC SIGMOID COLECTOMY       Consults:   Consults     Date and Time Order Name Status Description    4/12/2022  8:14 AM Inpatient Internal Medicine Consult            Condition on Discharge:  good    Vital Signs  Temp:  [97.5 °F (36.4 °C)-98.6 °F (37 °C)] 98.5 °F (36.9 °C)  Heart Rate:  [] 89  Resp:  [15-18] 16  BP: (123-139)/(64-76) 123/76    Physical Exam:   See History and Physical found in chart.    Discharge Disposition  Home or Self Care    Discharge Medications     Discharge Medications      New Medications      Instructions Start Date   oxyCODONE-acetaminophen 5-325 MG per tablet  Commonly known as: PERCOCET   1 tablet, Oral, Every 4 Hours PRN         Continue These Medications      Instructions Start Date   allopurinol 300 MG tablet  Commonly known as: ZYLOPRIM   1 tablet, Oral, Daily      atenolol 25 MG tablet  Commonly known as: TENORMIN   1 tablet, Oral, Daily      dapagliflozin-metformin HCl ER  MG tablet  Commonly known as: XIGDUO XR   1 tablet, Oral, Daily      folic acid 1 MG tablet  Commonly known as: FOLVITE   1,000 mcg, Oral, Daily      lisinopril-hydrochlorothiazide 20-25 MG per tablet  Commonly known as: PRINZIDE,ZESTORETIC   1 tablet, Oral, Daily      pantoprazole 40 MG EC tablet  Commonly known as: PROTONIX   40  mg, Oral, Daily      predniSONE 5 MG tablet  Commonly known as: DELTASONE   1 tablet, Oral, Daily      rosuvastatin 10 MG tablet  Commonly known as: CRESTOR   1 tablet, Oral, Daily         Stop These Medications    Enbrel 25 MG/0.5ML solution  Generic drug: Etanercept            Discharge Diet:     Activity at Discharge:     Follow-up Appointments  Future Appointments   Date Time Provider Department Center   9/29/2022  2:00 PM Deb Olson MD [facility]         Test Results Pending at Discharge       April L MD Mahesh  04/15/22  11:59 CDT

## 2022-04-15 NOTE — PROGRESS NOTES
Continued Stay Note  [facility]     Patient Name: Alicia Macedo  MRN: [id number]  Today's Date: 4/15/2022    Admit Date: 4/8/2022     Discharge Plan     Row Name 04/15/22 1230       Plan    Plan Home    Final Discharge Disposition Code 01 - home or self-care    Final Note Patient is discharged home today with no discharge planning needs / orders.                       Expected Discharge Date and Time     Expected Discharge Date Expected Discharge Time    Apr 15, 2022             LARON De La Cruz

## 2022-04-15 NOTE — PLAN OF CARE
Goal Outcome Evaluation:  Plan of Care Reviewed With: patient           Outcome Evaluation: Pt c/o mild to moderate abdominal pain at times, refused PRN pain meds. PCA pump in use. No c/o nausea. CPAP in use at night. IVF. SCDs on. Midline dressing c/d/i. Abdominal binder in use. VSS.

## 2022-04-15 NOTE — PROGRESS NOTES
Noreen Aragon MD FACS  Progress Note     LOS: 7 days   Patient Care Team:  Cristian Burciaga MD as PCP - General (Sports Medicine)      Subjective     Interval History:      [place] fulls  +flatus  +bm's  Pain controlled      Objective     Vital Signs  Temp:  [97.5 °F (36.4 °C)-98.6 °F (37 °C)] 98.5 °F (36.9 °C)  Heart Rate:  [] 89  Resp:  [15-18] 16  BP: (123-139)/(64-76) 123/76    Physical Exam:  General appearance - alert, well appearing, and in no distress  Abdomen - soft, still some distension, clean      Results Review:    Lab Results (last 24 hours)     Procedure Component Value Units Date/Time    Basic Metabolic Panel [[id number]]  (Abnormal) Collected: 04/15/22 0603    Specimen: Blood Updated: 04/15/22 0644     Glucose 106 mg/dL      BUN 2 mg/dL      Creatinine 0.76 mg/dL      Sodium 137 mmol/L      Potassium 3.9 mmol/L      Chloride 100 mmol/L      CO2 27.0 mmol/L      Calcium 8.8 mg/dL      BUN/Creatinine Ratio 2.6     Anion Gap 10.0 mmol/L      eGFR 110.9 mL/min/1.73      Comment: National Kidney Foundation and American Society of Nephrology (ASN) Task Force recommended calculation based on the Chronic Kidney Disease Epidemiology Collaboration (CKD-EPI) equation refit without adjustment for race.       Narrative:      GFR Normal >60  Chronic Kidney Disease <60  Kidney Failure <15      POC Glucose Once [[id number]]  (Normal) Collected: 04/15/22 0631    Specimen: Blood Updated: 04/15/22 0642     Glucose 100 mg/dL      Comment: : [id number] Clifford Patel ID: [id number]       CBC (No Diff) [[id number]]  (Abnormal) Collected: 04/15/22 0603    Specimen: Blood Updated: 04/15/22 0622     WBC 4.90 10*3/mm3      RBC 3.74 10*6/mm3      Hemoglobin 8.2 g/dL      Hematocrit 28.5 %      MCV 76.2 fL      MCH 21.9 pg      MCHC 28.8 g/dL      RDW 18.4 %      RDW-SD 46.4 fl      MPV 10.2 fL      Platelets 290 10*3/mm3     POC Glucose Once [[id number]]  (Normal) Collected: 04/14/22 1720    Specimen: Blood  Updated: 04/14/22 2311     Glucose 110 mg/dL      Comment: : [id number] Clifford DíazipMeter ID: [id number]       POC Glucose Once [[id number]]  (Normal) Collected: 04/14/22 1700    Specimen: Blood Updated: 04/14/22 1710     Glucose 119 mg/dL      Comment: : [id number] Reena Davis  LMeter ID: [id number]           Imaging Results (Last 24 Hours)     ** No results found for the last 24 hours. **            Assessment/Plan       POD# 7 sigmoidectomy, appy    home      Noreen Aragon MD  04/15/22  11:55 CDT

## 2022-04-15 NOTE — PAYOR COMM NOTE
"Alicia Dacosta (46 y.o. Male) [id number]    Additional clinical / 4/14 progress note   [facility] phone    Fax                Date of Birth   1975    Social Security Number       [address] [place] [address]    Home Phone   [phone]    MRN   [id number]       Mormonism   Other    Marital Status                               Admission Date   4/8/22    Admission Type   Elective    Admitting Provider   Noreen Aragon MD    Attending Provider   Noreen Aragon MD    Department, Room/Bed   [facility] 3C, 380/1       Discharge Date       Discharge Disposition       Discharge Destination                               Attending Provider: Noreen Aragon MD    Allergies: No Known Allergies    Isolation: None   Infection: None   Code Status: CPR   Advance Care Planning Activity    Ht: 153 cm (60.24\")   Wt: 98 kg (216 lb 0.8 oz)    Admission Cmt: None   Principal Problem: None                Active Insurance as of 4/8/2022     Primary Coverage     Payor Plan Insurance Group Employer/Plan Group    [employer]HE[employer] BLUE CROSS [employer] BLUE CROSS BLUE SHIELD PPO [id number]     Payor Plan Address Payor Plan Phone Number Payor Plan Fax Number Effective Dates    [address] [phone]  1/1/2021 - None Entered    [place] [address]       Subscriber Name Subscriber Birth Date Member ID       ALICIA DACOSTA 1975 [id number]                 Emergency Contacts      (Rel.) Home Phone Work Phone Mobile Phone    SHAHNAZ DACOSTA (Spouse) -- -- [phone]               Physician Progress Notes (last 24 hours)      Noreen Aragon MD at 04/14/22 1723          Noreen MCGEE. MD [facility]  Progress Note     LOS: 6 days   Patient Care Team:  Cristian Burciaga MD as PCP - General (Sports Medicine)      Subjective     Interval History:      +flatus and BM's.  [place]ville fulls     Objective     Vital Signs  Temp:  [97.7 °F (36.5 " °C)-98.7 °F (37.1 °C)] 98.6 °F (37 °C)  Heart Rate:  [84-99] 88  Resp:  [16-20] 16  BP: (118-136)/(64-77) 127/64    Physical Exam:  General appearance - alert, well appearing, and in no distress  Abdomen - softer, decreased distension, clean      Results Review:    Lab Results (last 24 hours)     Procedure Component Value Units Date/Time    POC Glucose Once [[id number]]  (Normal) Collected: 04/14/22 1700    Specimen: Blood Updated: 04/14/22 1710     Glucose 119 mg/dL      Comment: : [id number] Valles Susan  LMeter ID: [id number]       POC Glucose Once [[id number]]  (Normal) Collected: 04/14/22 1143    Specimen: Blood Updated: 04/14/22 1154     Glucose 102 mg/dL      Comment: : [id number] lettrs  LMeter ID: [id number]       CBC (No Diff) [[id number]] Collected: 04/14/22 0503    Specimen: Blood Updated: 04/14/22 0814     WBC --     Comment: Questionable result, new specimen requested.  Corrected result. Previous result was 3.88 10*3/mm3 on 4/14/2022 at 05 CDT.        RBC --     Comment: Questionable result, new specimen requested.  Corrected result. Previous result was 3.33 10*6/mm3 on 4/14/2022 at 0557 CDT.        Hemoglobin --     Comment: Questionable result, new specimen requested.  Corrected result. Previous result was 7.1 g/dL on 4/14/2022 at 05 CDT.        Hematocrit --     Comment: Questionable result, new specimen requested.  Corrected result. Previous result was 25.6 % on 4/14/2022 at 05 CDT.        MCV --     Comment: Questionable result, new specimen requested.  Corrected result. Previous result was 76.9 fL on 4/14/2022 at 05 CDT.        MCH --     Comment: Questionable result, new specimen requested.  Corrected result. Previous result was 21.3 pg on 4/14/2022 at 05 CDT.        MCHC --     Comment: Questionable result, new specimen requested.  Corrected result. Previous result was 27.7 g/dL on 4/14/2022 at 0557 CDT.        RDW --     Comment: Questionable result, new specimen  requested.  Corrected result. Previous result was 17.2 % on 4/14/2022 at [address] [place] [address].        RDW-SD --     Comment: Questionable result, new specimen requested.  Corrected result. Previous result was 46.8 fl on 4/14/2022 at [address] [place] [address].        MPV --     Comment: Questionable result, new specimen requested.  Corrected result. Previous result was 10.2 fL on 4/14/2022 at [address] [place] [address].        Platelets --     Comment: Questionable result, new specimen requested.  Corrected result. Previous result was 280 10*3/mm3 on 4/14/2022 at [address] [place] [address].       Basic Metabolic Panel [[id number]] Collected: 04/14/22 0503    Specimen: Blood Updated: 04/14/22 0811     Glucose --     Comment: Questionable result, new specimen requested.  Corrected result. Previous result was 615 mg/dL on 4/14/2022 at [address] [place] [address].        BUN --     Comment: Questionable result, new specimen requested.  Corrected result. Previous result was 5 mg/dL on 4/14/2022 at [address] [place] [address].        Creatinine --     Comment: Questionable result, new specimen requested.  Corrected result. Previous result was 0.72 mg/dL on 4/14/2022 at [address] [place] [address].        Sodium --     Comment: Questionable result, new specimen requested.  Corrected result. Previous result was 130 mmol/L on 4/14/2022 at [address] [place] [address].        Potassium --     Comment: Questionable result, new specimen requested.  Corrected result. Previous result was 4.9 mmol/L on 4/14/2022 at [address] [place] [address].        Chloride --     Comment: Questionable result, new specimen requested.  Corrected result. Previous result was 98 mmol/L on 4/14/2022 at [address] [place] [address].        CO2 --     Comment: Questionable result, new specimen requested.  Corrected result. Previous result was 22.0 mmol/L on 4/14/2022 at [address] [place] [address].        Calcium --     Comment: Questionable result, new specimen requested.  Corrected result. Previous result was 7.4 mg/dL on 4/14/2022 at [address] [place] [address].        BUN/Creatinine Ratio --     Comment: Questionable result, new specimen requested.  Corrected  result. Previous result was 6.9 on 4/14/2022 at 0639 CDT.        Anion Gap --     Comment: Questionable result, new specimen requested.  Corrected result. Previous result was 10.0 mmol/L on 4/14/2022 at 0639 CDT.        eGFR --     Comment: National Kidney Foundation and American Society of Nephrology (ASN) Task Force recommended calculation based on the Chronic Kidney Disease Epidemiology Collaboration (CKD-EPI) equation refit without adjustment for race.  Corrected result. Previous result was 112.7 mL/min/1.73 on 4/14/2022 at 0639 CDT.       Narrative:      GFR Normal >60  Chronic Kidney Disease <60  Kidney Failure <15      Basic Metabolic Panel [[id number]]  (Abnormal) Collected: 04/14/22 0705    Specimen: Blood Updated: 04/14/22 0753     Glucose 93 mg/dL      BUN 4 mg/dL      Creatinine 0.82 mg/dL      Sodium 137 mmol/L      Potassium 3.9 mmol/L      Chloride 99 mmol/L      CO2 23.0 mmol/L      Calcium 8.6 mg/dL      BUN/Creatinine Ratio 4.9     Anion Gap 15.0 mmol/L      eGFR 108.4 mL/min/1.73      Comment: National Kidney Foundation and American Society of Nephrology (ASN) Task Force recommended calculation based on the Chronic Kidney Disease Epidemiology Collaboration (CKD-EPI) equation refit without adjustment for race.       Narrative:      GFR Normal >60  Chronic Kidney Disease <60  Kidney Failure <15      CBC (No Diff) [[id number]]  (Abnormal) Collected: 04/14/22 0705    Specimen: Blood Updated: 04/14/22 0733     WBC 4.20 10*3/mm3      RBC 3.74 10*6/mm3      Hemoglobin 8.1 g/dL      Hematocrit 28.6 %      MCV 76.5 fL      MCH 21.7 pg      MCHC 28.3 g/dL      RDW 17.0 %      RDW-SD 46.2 fl      MPV 9.7 fL      Platelets 287 10*3/mm3     POC Glucose Once [[id number]]  (Normal) Collected: 04/14/22 0647    Specimen: Blood Updated: 04/14/22 0711     Glucose 100 mg/dL      Comment: : [id number] Malik Leon ID: [id number]           Imaging Results (Last 24 Hours)     ** No results found for the last 24  hours. **            Assessment/Plan       POD# 6 sigmoidectomy, appy    Allow bowel function to continue to return.      Noreen Aragon MD  04/14/22  17:23 CDT        Electronically signed by Noreen Aragon MD at 04/14/22 0527

## 2022-04-16 NOTE — OUTREACH NOTE
Prep Survey    Flowsheet Row Responses   [facility] facility patient discharged from? [place]   Is LACE score < 7 ? No   Emergency Room discharge w/ pulse ox? No   Eligibility Readm Mgmt   Discharge diagnosis Colon cancer   Does the patient have one of the following disease processes/diagnoses(primary or secondary)? General Surgery   Does the patient have Home health ordered? No   Is there a DME ordered? No   Comments regarding appointments see AVS   Prep survey completed? Yes          JOSÉ MANUEL ANSARI - Registered Nurse

## 2022-04-16 NOTE — PAYOR COMM NOTE
"[facility] 4-15-22  [id number]   [phone]    Mina Macedo (46 y.o. Male)             Date of Birth   1975    Social Security Number       Address   [address] [address]    Home Phone   [phone]    MRN   [id number]       [place]   Other    Marital Status                               Admission Date   4/8/22    Admission Type   Elective    Admitting Provider   Noreen Aragon MD    Attending Provider       Department, Room/Bed   [facility] 3C, 380/1       Discharge Date   4/15/2022    Discharge Disposition   Home or Self Care    Discharge Destination                               Attending Provider: (none)   Allergies: No Known Allergies    Isolation: None   Infection: None   Code Status: Prior   Advance Care Planning Activity    Ht: 153 cm (60.24\")   Wt: 98 kg (216 lb 0.8 oz)    Admission Cmt: None   Principal Problem: None                Active Insurance as of 4/8/2022     Primary Coverage     Payor Plan Insurance Group Employer/Plan Group    [employer]HEM BLUE CROSS ANTHEM BLUE CROSS BLUE SHIELD PPO [id number]     Payor Plan Address Payor Plan Phone Number Payor Plan Fax Number Effective Dates    [address] [phone]  1/1/2021 - None Entered    [place], [place] [address]       Subscriber Name Subscriber Birth Date Member ID       MINA MACEDO 1975 [id number]                 Emergency Contacts      (Rel.) Home Phone Work Phone Mobile Phone    SHAHNAZ MACEDO (Spouse) -- -- [phone]               Discharge Summary      Noreen Aragon MD at 04/15/22 1159          Consults     Date and Time Order Name Status Description    4/12/2022  8:14 AM Inpatient Internal Medicine Consult        Norene Argaon MD [facility] Discharge Summary    Date of Discharge:  4/15/2022    Discharge Diagnosis: colon cancer    Presenting Problem/History of Present Illness  Colon cancer (HCC) [C18.9]     Hospital Course  Patient is a 46 y.o. male presented with " known diagnosis of sigmoid adenocarcinoma.  He underwent open sigmoidectomy with appendectomy.  His diet was advanced as bowel function returned.  His prednisone as held and resumed at time of discharge.  He was given a script for percocet..      Procedures Performed  Procedure(s):  HAND ASSISTED LAPAROSCOPIC SIGMOID COLECTOMY       Consults:   Consults     Date and Time Order Name Status Description    4/12/2022  8:14 AM Inpatient Internal Medicine Consult            Condition on Discharge:  good    Vital Signs  Temp:  [97.5 °F (36.4 °C)-98.6 °F (37 °C)] 98.5 °F (36.9 °C)  Heart Rate:  [] 89  Resp:  [15-18] 16  BP: (123-139)/(64-76) 123/76    Physical Exam:   See History and Physical found in chart.    Discharge Disposition  Home or Self Care    Discharge Medications     Discharge Medications      New Medications      Instructions Start Date   oxyCODONE-acetaminophen 5-325 MG per tablet  Commonly known as: PERCOCET   1 tablet, Oral, Every 4 Hours PRN         Continue These Medications      Instructions Start Date   allopurinol 300 MG tablet  Commonly known as: ZYLOPRIM   1 tablet, Oral, Daily      atenolol 25 MG tablet  Commonly known as: TENORMIN   1 tablet, Oral, Daily      dapagliflozin-metformin HCl ER  MG tablet  Commonly known as: XIGDUO XR   1 tablet, Oral, Daily      folic acid 1 MG tablet  Commonly known as: FOLVITE   1,000 mcg, Oral, Daily      lisinopril-hydrochlorothiazide 20-25 MG per tablet  Commonly known as: PRINZIDE,ZESTORETIC   1 tablet, Oral, Daily      pantoprazole 40 MG EC tablet  Commonly known as: PROTONIX   40 mg, Oral, Daily      predniSONE 5 MG tablet  Commonly known as: DELTASONE   1 tablet, Oral, Daily      rosuvastatin 10 MG tablet  Commonly known as: CRESTOR   1 tablet, Oral, Daily         Stop These Medications    Enbrel 25 MG/0.5ML solution  Generic drug: Etanercept            Discharge Diet:     Activity at Discharge:     Follow-up Appointments  Future Appointments    Date Time Provider Department Center   9/29/2022  2:00 PM Deb Olson MD [facility]         Test Results Pending at Discharge       Noreen Aragon MD  04/15/22  11:59 CDT              Electronically signed by Noreen Aragon MD at 04/15/22 1200

## 2022-04-20 ENCOUNTER — READMISSION MANAGEMENT (OUTPATIENT)
Dept: CALL CENTER | Facility: HOSPITAL | Age: 47
End: 2022-04-20

## 2022-04-20 NOTE — OUTREACH NOTE
General Surgery Week 1 Survey    Flowsheet Row Responses   [facility] patient discharged from? [place]   Does the patient have one of the following disease processes/diagnoses(primary or secondary)? General Surgery   Week 1 attempt successful? Yes   Call start time 0938   Call end time [phone]   Discharge diagnosis Colon cancer, HAND ASSISTED LAPAROSCOPIC SIGMOID COLECTOMY   Is patient permission given to speak with other caregiver? No   Meds reviewed with patient/caregiver? Yes   Does the patient have all medications related to this admission filled (includes all antibiotics, pain medications, etc.) Yes   Is the patient taking all medications as directed (includes completed medication regime)? Yes   Medication comments Patient reports that he took dulcolax this am for constipation   Does the patient have a follow up appointment scheduled with their surgeon? Yes   Has the patient kept scheduled appointments due by today? N/A   Comments Patient reports that he is seeing PCP tomorrow    Has home health visited the patient within 72 hours of discharge? N/A   Psychosocial issues? No   Did the patient receive a copy of their discharge instructions? Yes   Nursing interventions Reviewed instructions with patient   What is the patient's perception of their health status since discharge? Improving   Nursing interventions Nurse provided patient education   Is the patient /caregiver able to teach back basic post-op care? No tub bath, swimming, or hot tub until instructed by MD, Continue use of incentive spirometry at least 1 week post discharge, Practice 'cough and deep breath', Keep incision areas clean,dry and protected, Lifting as instructed by MD in discharge instructions   Is the patient/caregiver able to teach back signs and symptoms of incisional infection? Increased redness, swelling or pain at the incisonal site, Increased drainage or bleeding, Pus or odor from incision, Fever   Is the patient/caregiver able to  teach back steps to recovery at home? Set small, achievable goals for return to baseline health, Rest and rebuild strength, gradually increase activity, Eat a well-balance diet   If the patient is a current smoker, are they able to teach back resources for cessation? Not a smoker   Is the patient/caregiver able to teach back the hierarchy of who to call/visit for symptoms/problems? PCP, Specialist, Home health nurse, Urgent Care, ED, 911 Yes   Week 1 call completed? Yes          ZACARIAS OLIVAS - Registered Nurse

## 2022-05-03 DIAGNOSIS — K63.89 MASS OF COLON: Primary | ICD-10-CM

## 2022-05-03 NOTE — PROGRESS NOTES
MEDICAL ONCOLOGY CONSULTATION    Pt Name: Morenita Baptiste  MRN: [id number]  YOB: 1975  Date of evaluation: 5/4/2022    REASON FOR CONSULTATION:  Colon cancer  REQUESTING PHYSICIAN: Dr Zahida Foy    History Obtained From:  patient and old medical records    HISTORY OF PRESENT ILLNESS:    Diagnosis  · Adenocarcinoma, sigmoid colon, April 2022  · Well to moderately differentiated  · pT4a pN0 cM0, stage II high risk  · High risk features: pT4, LVI  · IHC MMR-pending    Treatment Summary  · 4/8/22 Sigmoid colectomy with negative lymph node dissection by Dr. Carol Ann Quevedo  · To be determined-likely capecitabine/oxaliplatin x4 cycles (3 months)    Cancer History  Acosta Evans was first seen by me on 5/4/2022. He was referred by Dr. Carol Ann Quevedo. The patient was found to have anemia and further work-up with colonoscopy revealed that he had a large mass in the transverse colon. · 3/18/22 Colonoscopy/EGD by Dr. Frankie Flynn: COLONOSCOPY: An infiltrative partially obstructing large mass was found in the transverse colon--estimated. The mass was circumferential. I could not get the scope through as past this area. Oozing was present. This was biopsied with a cold forceps for histology. Area was tattooed with an injection of Spot (carbon black). No additional abnormalities were found on retroflexion. EGD: Normal esophagus. Non-erosive gastritis. Biopsied. Normal examined duodenum. Biopsied. · 3/18/22 CEA 0.78  · 3/18/22 Colon mass, site not otherwise specified, biopsy: Invasive moderately differentiated colonic adenocarcinoma with ulceration. Small bowel, biopsy:  Unremarkable small bowel mucosa. · 3/29/22  CT abd/pelvis [facility]): Sigmoid colon mass with extraluminal extension is consistent with primary colon malignancy. No CT evidence of distant metastatic disease in the abdomen or pelvis. No evidence of liver masses.   · 3/29/22 CT chest [facility]): No evidence of metastatic disease in the chest. Bridging anterior osteophytes throughout the thoracic spine, suggestive of ankylosing spondylitis. · 4/8/22 Sigmoid colon, sigmoid resection by Dr. Vera Diaz at [facility]: Well to moderately differentiated colorectal adenocarcinoma (6.3 cm), focally invasive into the visceral peritoneum through area of inflammation. Positive for focal lymphvascular invasion. Negative for perineural invasion. Twenty-two regional lymph nodes, negative for metastatic carcinoma (0/22). Incidental prominent mucosal fold with early superficial hyperplastic changes. Viable opposing surgical resection margins, negative for tumor. pTNM CLASSIFICATION:  pT4a pN0 cM0. Appendix, appendectomy: Appendix with diffuse intraluminal fibrosis and no other significant pathologic abnormalities. Negative for malignancy. · 5/4/2022-he was first seen by me. Essentially, stage II high risk pT4, LVI. IHC MMR was not performed and was ordered today. Past Medical History:    Diagnosis Date    [facility]ylosing [address]yl[address] ([facility] [address]    Family history of colon cancer    GERD (gastroesophageal reflux disease)    Gout    Hyperlipidemia    Hypertension    Osteoarthritis    Sleep apnea   Colon cancer, stage II      Past Surgical History:    CHOLECYSTECTOMY   Dr. Fleming Stage COLONOSCOPY N/A 03/18/2022   Likely malignant partially obstructing tumor in the transverse colon-biopsied-Tattooed; Repeat colonoscopy (date not yet determined) because the examination was incomplete    ENDOSCOPY    ENDOSCOPY N/A 03/18/2022   Normal esophagus; Non-erosive gastritis-biopsied;  Normal examined duodenum-biopsied     Social History:    Marital status:   Smoking status:No  ETOH status:No  Resides: [address] [place] [address]    Family History:   Family History   Problem Relation Age of Onset   [address] [facility] [address] Cancer Brother         Colon    Cancer Paternal Uncle         Colon   Problem Relation Age of Onset    Cirrhosis Father 52    Alcohol abuse Father    Colon cancer Maternal Aunt   In her 42's    Alcohol abuse Paternal [de-identified] Colon cancer Paternal Uncle   In his 63's    Alcohol abuse Paternal Uncle    Alcohol abuse Maternal Grandfather    Colon polyps Neg Hx    Esophageal cancer Neg Hx    Liver cancer Neg Hx    Liver disease Neg Hx    Rectal cancer Neg Hx    Stomach cancer Neg Hx       Current Hospital Medications:    Current Outpatient Medications   Medication Sig Dispense Refill    allopurinol (ZYLOPRIM) 300 MG tablet       lisinopril-hydroCHLOROthiazide (PRINZIDE;ZESTORETIC) 20-25 MG per tablet lisinopril 20 mg-hydrochlorothiazide 25 mg tablet      ENBREL SURECLICK 50 MG/ML SOAJ       rosuvastatin (CRESTOR) 10 MG tablet       folic acid (FOLVITE) 1 MG tablet TAKE 1 TABLET BY MOUTH ONCE DAILY      Dapagliflozin-metFORMIN HCl ER (XIGDUO XR)  MG TB24 Xigduo XR 10 mg-1,000 mg tablet,extended release      atenolol (TENORMIN) 25 MG tablet atenolol 25 mg tablet       No current facility-administered medications for this visit.        Allergies: No Known Allergies      Subjective   REVIEW OF SYSTEMS:   CONSTITUTIONAL: no fever, no night sweats, no fatigue;  HEENT: no blurring of vision, no double vision, no hearing difficulty, no tinnitus, no ulceration, no dysplasia, no epistaxis;  LUNGS: no cough, no hemoptysis, no wheeze,  no shortness of breath;  CARDIOVASCULAR: no palpitation, no chest pain, no shortness of breath;  GI: no abdominal pain, no nausea, no vomiting, no diarrhea,  constipation;  FAINA: no dysuria, no hematuria, no frequency or urgency, no nephrolithiasis;  MUSCULOSKELETAL:back pain, muscle pain, joint pain, joint swelling, no stiffness;  ENDOCRINE: no polyuria, no polydipsia, no cold or heat intolerance;  HEMATOLOGY: no easy bruising or bleeding, no history of clotting disorder;  DERMATOLOGY: no skin rash, no eczema, no pruritus;  PSYCHIATRY: no depression, no anxiety, no panic attacks, no suicidal ideation, no homicidal ideation;  NEUROLOGY: no syncope, no seizures, no numbness or tingling of hands, no numbness or tingling of feet, no paresis;    Objective   /72   Pulse 74   Ht 5' 4\" (1.626 m)   Wt 202 lb (91.6 kg)   SpO2 98%   BMI 34.67 kg/m²     PHYSICAL EXAM:  CONSTITUTIONAL: Alert, appropriate, no acute distress  EYES: Non icteric, EOM intact, pupils equal round   ENT: Mucus membranes moist, no oral pharyngeal lesions, external inspection of ears and nose are normal  NECK: Supple, no masses. No palpable thyroid mass  CHEST/LUNGS: CTA bilaterally, normal respiratory effort   CARDIOVASCULAR: RRR, no murmurs. No lower extremity edema  ABDOMEN: soft non-tender, active bowel sounds, no HSM. No palpable masses  EXTREMITIES: warm, full ROM in all 4 extremities, no focal weakness. SKIN: warm, dry with no rashes or lesions  LYMPH: No cervical, clavicular, axillary, or inguinal lymphadenopathy  NEUROLOGIC: follows commands, non focal   PSYCH: mood and affect appropriate. Alert and oriented to time, place, person      LABORATORY RESULTS REVIEWED/ANALYZED BY ME:  Lab Results   Component Value Date    WBC 10.60 (H) 05/04/2022    HGB 11.3 (L) 05/04/2022    HCT 40.1 05/04/2022    MCV 80.2 05/04/2022     05/04/2022     Lab Results   Component Value Date    NEUTROABS 6.77 (H) 05/04/2022     Preop CEA 0.78  RADIOLOGY STUDIES REVIEWED BY ME:  As above      ASSESSMENT:    No orders of the defined types were placed in this encounter. Kenny Griffiths was seen today for new patient. Diagnoses and all orders for this visit:    Adenocarcinoma of sigmoid colon [facility])    Care plan discussed with patient    Immunization counseling    Iron deficiency anemia, unspecified iron deficiency anemia type    Other orders  -     CBC with Auto Differential       Stage II, pT4 N0 M0, LVI, IHC MMR pending  The patient was counseled today about diagnosis, staging, prognosis, diagnostic tests, medications, side effects and disease management.    Essentially, stage II high risk colon cancer (high risk features: pT4, lymphovascular invasion). IHC MMR is pending. Ordered today. Lengthy discussion about NCCN guidelines, ASCO and risk factors. -I recommended capecitabine/oxaliplatin x3 months.  -Discussed side effects/logistics of treatment/disease management        Iron deficiency anemia-secondary to GI bleed  -Hemoglobin 8.2/MCV 76 on 4/15/2022  -Hemoglobin 11.3/MCV 80 on 5/4/2022  -Recommended iron sulfate 325 mg p.o. daily    PLAN:  · RTC with MD by phone visit 5/13/22  · Recommend IHC MMR on [facility] colon pathology  · Recommend CAPEOX-Capecitibine Oxaliplatin every 21 days x4 cycles after IHC MMR results reviewed   · Recommend OTC Ferrous sulfate 325mg daily  · Encouraged COVID vaccinations      KADIE, Gabby Holbrook am pre-charting as a registered nurse for Arlene Garcia MD. Electronically signed by Gabby Holbrook RN on 5/4/2022 at 5:33 PM CDT. Patel Parks am scribing for Arlene Garcia MD. Electronically signed by Gabby Holbrook RN on 5/4/2022 at 2:35 PM CDT. I, Dr Natalie Jack, personally performed the services described in this documentation as scribed by Gabby Holbrook RN in my presence and is both accurate and complete. I have seen, examined and reviewed this patient medication list, appropriate labs and imaging studies. I reviewed relevant medical records and others physicians notes. I discussed the plans of care with the patient. I answered all the questions to the patients satisfaction. I have also reviewed the chief complaint (CC) and part of the history (History of Present Illness (HPI), Past Family Social History [facility]), or Review of Systems (ROS) and made changes when appropriated.        (Please note that portions of this note were completed with a voice recognition program. Efforts were made to edit the dictations but occasionally words are mis-transcribed.)    Electronically signed by Arlene Garcia MD on 5/4/2022 at 4:44 PM      The total time (62 min) I spent to see the patient today includes at least one or more of the following: preparing to see the patient by reviewing prior tests, prior notes or other relevant information, performing appropriate independent examination and evaluation, counseling, ordering of medications, tests or procedures, referrals, communicating with other healthcare professionals when appropriated to coordinate care, documenting clinic information in the electronic medical record or other health records, independently interpreting results of tests, managing test results and communicating the results to the patient/family or caregiver.

## 2022-05-04 ENCOUNTER — HOSPITAL ENCOUNTER (OUTPATIENT)
Dept: INFUSION THERAPY | Age: 47
Discharge: HOME | End: 2022-05-04
Payer: COMMERCIAL

## 2022-05-04 ENCOUNTER — OFFICE VISIT (OUTPATIENT)
Dept: HEMATOLOGY | Age: 47
End: 2022-05-04
Payer: COMMERCIAL

## 2022-05-04 VITALS
HEART RATE: 74 BPM | SYSTOLIC BLOOD PRESSURE: 136 MMHG | WEIGHT: 202 LBS | HEIGHT: 64 IN | DIASTOLIC BLOOD PRESSURE: 72 MMHG | OXYGEN SATURATION: 98 % | BODY MASS INDEX: 34.49 KG/M2

## 2022-05-04 DIAGNOSIS — D50.9 IRON DEFICIENCY ANEMIA, UNSPECIFIED IRON DEFICIENCY ANEMIA TYPE: ICD-10-CM

## 2022-05-04 DIAGNOSIS — Z71.85 IMMUNIZATION COUNSELING: ICD-10-CM

## 2022-05-04 DIAGNOSIS — C18.7 ADENOCARCINOMA OF SIGMOID COLON (HCC): Primary | ICD-10-CM

## 2022-05-04 DIAGNOSIS — Z71.89 CARE PLAN DISCUSSED WITH PATIENT: ICD-10-CM

## 2022-05-04 DIAGNOSIS — K63.89 MASS OF COLON: ICD-10-CM

## 2022-05-04 LAB
BASOPHILS ABSOLUTE: 0.14 K/UL (ref 0.01–0.08)
BASOPHILS RELATIVE PERCENT: 1.3 % (ref 0.1–1.2)
EOSINOPHILS ABSOLUTE: 0.5 K/UL (ref 0.04–0.54)
EOSINOPHILS RELATIVE PERCENT: 4.7 % (ref 0.7–7)
HCT VFR BLD CALC: 40.1 % (ref 40.1–51)
HEMOGLOBIN: 11.3 G/DL (ref 13.7–17.5)
LYMPHOCYTES ABSOLUTE: 2.27 K/UL (ref 1.18–3.74)
LYMPHOCYTES RELATIVE PERCENT: 21.4 % (ref 19.3–53.1)
MCH RBC QN AUTO: 22.6 PG (ref 25.7–32.2)
MCHC RBC AUTO-ENTMCNC: 28.2 G/DL (ref 32.3–36.5)
MCV RBC AUTO: 80.2 FL (ref 79–92.2)
MONOCYTES ABSOLUTE: 0.89 K/UL (ref 0.24–0.82)
MONOCYTES RELATIVE PERCENT: 8.4 % (ref 4.7–12.5)
NEUTROPHILS ABSOLUTE: 6.77 K/UL (ref 1.56–6.13)
NEUTROPHILS RELATIVE PERCENT: 63.9 % (ref 34–71.1)
PDW BLD-RTO: 22.6 % (ref 11.6–14.4)
PLATELET # BLD: 311 K/UL (ref 163–337)
PMV BLD AUTO: 11 FL (ref 7.4–10.4)
RBC # BLD: 5 M/UL (ref 4.63–6.08)
WBC # BLD: 10.6 K/UL (ref 4.23–9.07)

## 2022-05-04 PROCEDURE — 99202 OFFICE O/P NEW SF 15 MIN: CPT

## 2022-05-04 PROCEDURE — 85025 COMPLETE CBC W/AUTO DIFF WBC: CPT

## 2022-05-04 PROCEDURE — 99205 OFFICE O/P NEW HI 60 MIN: CPT | Performed by: INTERNAL MEDICINE

## 2022-05-04 RX ORDER — ETANERCEPT 50 MG/ML
SOLUTION SUBCUTANEOUS
COMMUNITY
Start: 2022-03-07

## 2022-05-04 RX ORDER — FOLIC ACID 1 MG/1
TABLET ORAL
COMMUNITY
Start: 2022-03-21

## 2022-05-04 RX ORDER — ALLOPURINOL 300 MG/1
TABLET ORAL
COMMUNITY
Start: 2022-03-01

## 2022-05-04 RX ORDER — ATENOLOL 25 MG/1
TABLET ORAL
COMMUNITY

## 2022-05-04 RX ORDER — DAPAGLIFLOZIN AND METFORMIN HYDROCHLORIDE 10; 1000 MG/1; MG/1
TABLET, FILM COATED, EXTENDED RELEASE ORAL
COMMUNITY

## 2022-05-04 RX ORDER — ROSUVASTATIN CALCIUM 10 MG/1
TABLET, COATED ORAL
COMMUNITY
Start: 2022-03-01

## 2022-05-04 RX ORDER — LISINOPRIL AND HYDROCHLOROTHIAZIDE 25; 20 MG/1; MG/1
TABLET ORAL
COMMUNITY

## 2022-05-12 NOTE — PROGRESS NOTES
MEDICAL ONCOLOGY PROGRESS NOTE    Pt Name: Jemima Cross  MRN: [id number]  YOB: 1975  Date of evaluation: 5/13/2022  History Obtained From:  patient and old medical records    HISTORY OF PRESENT ILLNESS:   This is a telephone visit for further discussion regarding adjuvant chemotherapy for his high risk stage II colon cancer. Patient did some research and has come up with a decision regarding his treatment. He denies any new complaints. Diagnosis  · Adenocarcinoma, sigmoid colon, April 2022  · Well to moderately differentiated  · pT4a pN0 cM0, stage II high risk  · High risk features: pT4, LVI  · IHC MMR (MLH1 and PMS2) Intermediate for MMR; No loss of MSH2 and MSH6 by Immunochemistry    Treatment Summary  · 4/8/22 Sigmoid colectomy with negative lymph node dissection by Dr. Jeannie Clayton  · Anticipated adjuvant capecitabine x6-month    Cancer History  Bruno Jones was first seen by me on 5/4/2022. He was referred by Dr. Jeannie Clayton. The patient was found to have anemia and further work-up with colonoscopy revealed that he had a large mass in the transverse colon. · 3/18/22 Colonoscopy/EGD by Dr. Christ Craig: COLONOSCOPY: An infiltrative partially obstructing large mass was found in the transverse colon--estimated. The mass was circumferential. I could not get the scope through as past this area. Oozing was present. This was biopsied with a cold forceps for histology. Area was tattooed with an injection of Spot (carbon black). No additional abnormalities were found on retroflexion. EGD: Normal esophagus. Non-erosive gastritis. Biopsied. Normal examined duodenum. Biopsied. · 3/18/22 CEA 0.78  · 3/18/22 Colon mass, site not otherwise specified, biopsy: Invasive moderately differentiated colonic adenocarcinoma with ulceration. Small bowel, biopsy:  Unremarkable small bowel mucosa.   · 3/29/22  CT abd/pelvis [facility]): Sigmoid colon mass with extraluminal extension is consistent with primary colon malignancy. No CT evidence of distant metastatic disease in the abdomen or pelvis. No evidence of liver masses. · 3/29/22 CT chest [facility]): No evidence of metastatic disease in the chest. Bridging anterior osteophytes throughout the thoracic spine, suggestive of ankylosing spondylitis. · 4/8/22 Sigmoid colon, sigmoid resection by Dr. Dorothy Silva at [facility]: Well to moderately differentiated colorectal adenocarcinoma (6.3 cm), focally invasive into the visceral peritoneum through area of inflammation. Positive for focal lymphvascular invasion. Negative for perineural invasion. Twenty-two regional lymph nodes, negative for metastatic carcinoma (0/22). Incidental prominent mucosal fold with early superficial hyperplastic changes. Viable opposing surgical resection margins, negative for tumor. pTNM CLASSIFICATION:  pT4a pN0 cM0. Appendix, appendectomy: Appendix with diffuse intraluminal fibrosis and no other significant pathologic abnormalities. Negative for malignancy. · 5/4/2022-he was first seen by me. Essentially, stage II high risk pT4, LVI. IHC MMR was not performed and was ordered today. · 5/15/2022-anticipated adjuvant capecitabine x6-month    Past Medical History:    Diagnosis Date    [facility]yl[facility] sp[facility]ylitis ([facility] [address]    Family history of colon cancer    GERD (gastroesophageal reflux disease)    Gout    Hyperlipidemia    Hypertension    Osteoarthritis    Sleep apnea   Colon cancer, stage II      Past Surgical History:    CHOLECYSTECTOMY   Dr. Orlin Lombardo COLONOSCOPY N/A 03/18/2022   Likely malignant partially obstructing tumor in the transverse colon-biopsied-Tattooed; Repeat colonoscopy (date not yet determined) because the examination was incomplete    ENDOSCOPY    ENDOSCOPY N/A 03/18/2022   Normal esophagus; Non-erosive gastritis-biopsied;  Normal examined duodenum-biopsied     Social History:    Marital status:   Smoking status:No  ETOH status:No  Resides: [place], [place]    Family History: Family History   Problem Relation Age of Onset    Cancer Brother         Colon    Cancer Paternal Uncle         Colon   Problem Relation Age of Onset    Cirrhosis Father 52    Alcohol abuse Father    Colon cancer Maternal Aunt   In her 42's    Alcohol abuse Paternal [de-identified]    Colon cancer Paternal Uncle   In his 63's    Alcohol abuse Paternal Uncle    Alcohol abuse Maternal Grandfather    Colon polyps Neg Hx    Esophageal cancer Neg Hx    Liver cancer Neg Hx    Liver disease Neg Hx    Rectal cancer Neg Hx    Stomach cancer Neg Hx       Current Hospital Medications:    Current Outpatient Medications   Medication Sig Dispense Refill    allopurinol (ZYLOPRIM) 300 MG tablet       lisinopril-hydroCHLOROthiazide (PRINZIDE;ZESTORETIC) 20-25 MG per tablet lisinopril 20 mg-hydrochlorothiazide 25 mg tablet      ENBREL SURECLICK 50 MG/ML SOAJ       rosuvastatin (CRESTOR) 10 MG tablet       folic acid (FOLVITE) 1 MG tablet TAKE 1 TABLET BY MOUTH ONCE DAILY      Dapagliflozin-metFORMIN HCl ER (XIGDUO XR)  MG TB24 Xigduo XR 10 mg-1,000 mg tablet,extended release      atenolol (TENORMIN) 25 MG tablet atenolol 25 mg tablet       No current facility-administered medications for this visit.        Allergies: No Known Allergies      Subjective   REVIEW OF SYSTEMS:   CONSTITUTIONAL: no fever, no night sweats, no fatigue;  HEENT: no blurring of vision, no double vision, no hearing difficulty, no tinnitus, no ulceration, no dysplasia, no epistaxis;   LUNGS: no cough, no hemoptysis, no wheeze,  no shortness of breath;  CARDIOVASCULAR: no palpitation, no chest pain, no shortness of breath;  GI: no abdominal pain, no nausea, no vomiting, no diarrhea, no constipation;  FAINA: no dysuria, no hematuria, no frequency or urgency, no nephrolithiasis;  MUSCULOSKELETAL: no joint pain, no swelling, no stiffness;  ENDOCRINE: no polyuria, no polydipsia, no cold or heat intolerance;  HEMATOLOGY: no easy bruising or bleeding, no history of clotting disorder;  DERMATOLOGY: no skin rash, no eczema, no pruritus;  PSYCHIATRY: no depression, no anxiety, no panic attacks, no suicidal ideation, no homicidal ideation;  NEUROLOGY: no syncope, no seizures, no numbness or tingling of hands, no numbness or tingling of feet, no paresis;     Objective   There were no vitals taken for this visit. PHYSICAL EXAM:  Telephone visit. LABORATORY RESULTS REVIEWED/ANALYZED BY ME:      RADIOLOGY STUDIES REVIEWED BY ME:  None    ASSESSMENT:    No orders of the defined types were placed in this encounter. Bacilio Umaña was seen today for follow-up. Diagnoses and all orders for this visit:    Adenocarcinoma of sigmoid colon [facility])    Care plan discussed with patient    Mass of colon       Stage II, pT4 N0 M0, LVI, IHC MMR pending  Essentially, stage II high risk colon cancer (high risk features: pT4, lymphovascular invasion). IHC MMR is pending. Lengthy discussion about NCCN guidelines, ASCO and risk factors. Stage II high risk colon cancer  High risk features: Lymphovascular invasion, pT4     Discussed NCCN guidelines     Recommended:  Capecitabine 1000 mg/m2 PO twice daily 2 weeks on/1 week of  8 cycles equal 6-month    Iron deficiency anemia-secondary to GI bleed  -Hemoglobin 8.2/MCV 76 on 4/15/2022  -Hemoglobin 11.3/MCV 80 on 5/4/2022  -Recommended iron sulfate 325 mg p.o. daily    Dixon syndrome screening  IHC MMR-could not be determined. No loss of MSH2/MSH6 by IHC  -Indeterminate for MMR (MLH1 and PMS2) by IHC  -Will order [facility] genetic test during next visit. PLAN:  · RTC with MD by phone visit 5/13/22  · Recommend MSI P colon pathology-requested 5/12/2022  · Recommend Capecitabine after IHC MMR results reviewed   · Continue OTC Ferrous sulfate 325mg daily  · Discussed about adjuvant chemotherapy with capecitabine.   · Encouraged COVID vaccinations  · Order MSI, IHC MMR was not conclusive  · [facility] next Francis Guerrero am pre charting  as Medical Assistant for Jocelyn Chen MD. Electronically signed by Susan Rodríguez MA on 5/13/2022 at 8:39 AM CDT. Tish Raymond am scribing as Medical Assistant for Jocelyn Chen MD. Electronically signed by Susan Rodríguez MA on 5/13/2022 at 12:46 PM CDT. I, Dr Dana Portillo, personally performed the services described in this documentation as scribed by Susan Rodríguez MA in my presence and is both accurate and complete. Electronically signed by Jocelyn Chen MD on 5/13/2022 at 12:55 PM      Lorena Ruiz is a 48 y.o. male evaluated via telephone on 5/13/2022 for Follow-up (Adenocarcinoma of sigmoid colon ([facility] [address]))  . Documentation:  I communicated with the patient and/or health care decision maker about as above  Details of this discussion including any medical advice provided: As above    Total Time: minutes: 11-20 minutes    Lorena Ruiz was evaluated through a synchronous (real-time) audio encounter. Patient identification was verified at the start of the visit. He (or guardian if applicable) is aware that this is a billable service, which includes applicable co-pays. This visit was conducted with the patient's (and/or legal guardian's) verbal consent. He has not had a related appointment within my department in the past 7 days or scheduled within the next 24 hours. The patient was located in a state where the provider was licensed to provide care.     Note: not billable if this call serves to triage the patient into an appointment for the relevant concern    Jocelyn Chen MD

## 2022-05-13 ENCOUNTER — CLINICAL DOCUMENTATION (OUTPATIENT)
Dept: HEMATOLOGY | Age: 47
End: 2022-05-13

## 2022-05-13 ENCOUNTER — TELEMEDICINE (OUTPATIENT)
Dept: HEMATOLOGY | Age: 47
End: 2022-05-13
Payer: COMMERCIAL

## 2022-05-13 DIAGNOSIS — K63.89 MASS OF COLON: ICD-10-CM

## 2022-05-13 DIAGNOSIS — Z71.89 CARE PLAN DISCUSSED WITH PATIENT: ICD-10-CM

## 2022-05-13 DIAGNOSIS — T45.1X5A CHEMOTHERAPY-INDUCED NAUSEA: ICD-10-CM

## 2022-05-13 DIAGNOSIS — R11.0 CHEMOTHERAPY-INDUCED NAUSEA: ICD-10-CM

## 2022-05-13 DIAGNOSIS — C18.7 ADENOCARCINOMA OF SIGMOID COLON (HCC): Primary | ICD-10-CM

## 2022-05-13 PROCEDURE — 99442 PR PHYS/QHP TELEPHONE EVALUATION 11-20 MIN: CPT | Performed by: INTERNAL MEDICINE

## 2022-05-13 RX ORDER — CAPECITABINE 500 MG/1
TABLET, FILM COATED ORAL
Qty: 112 TABLET | Refills: 5 | Status: SHIPPED | OUTPATIENT
Start: 2022-05-13 | End: 2022-08-11 | Stop reason: SDUPTHER

## 2022-05-13 NOTE — PROGRESS NOTES
Stage II high risk colon cancer  High risk features: Lymphovascular invasion, pT4    Discussed NCCN guidelines    Recommended:  Capecitabine 1000 mg/m2 PO twice daily 2 weeks on/1 week of  8 cycles equal 6-month

## 2022-05-15 RX ORDER — PROMETHAZINE HYDROCHLORIDE 12.5 MG/1
12.5 TABLET ORAL EVERY 6 HOURS PRN
Qty: 90 TABLET | Refills: 3 | Status: SHIPPED | OUTPATIENT
Start: 2022-05-15 | End: 2022-06-14

## 2022-05-18 RX ORDER — CAPECITABINE 500 MG/1
TABLET, FILM COATED ORAL
Qty: 112 TABLET | Refills: 3 | Status: ON HOLD | OUTPATIENT
Start: 2022-05-18 | End: 2022-10-14

## 2022-05-23 LAB
LAB AP CASE REPORT: NORMAL
LAB AP SYNOPTIC CHECKLIST: NORMAL
PATH REPORT.FINAL DX SPEC: NORMAL
PATH REPORT.GROSS SPEC: NORMAL

## 2022-05-25 ENCOUNTER — TELEPHONE (OUTPATIENT)
Dept: INFUSION THERAPY | Age: 47
End: 2022-05-25

## 2022-05-25 NOTE — TELEPHONE ENCOUNTER
Left message for  Karrie to give me a call about assistance for his $615.13 copay for Xeloda.     [address] [place] [address] Oncology and Hematology  [phone]

## 2022-06-24 ENCOUNTER — APPOINTMENT (OUTPATIENT)
Dept: INFUSION THERAPY | Age: 47
End: 2022-06-24
Payer: COMMERCIAL

## 2022-06-28 NOTE — PROGRESS NOTES
MEDICAL ONCOLOGY PROGRESS NOTE    Pt Name: Keyon Butt  MRN: [id number]  YOB: 1975  Date of evaluation: 6/30/2022  History Obtained From:  patient and old medical records    HISTORY OF PRESENT ILLNESS:   This is a telephone visit for further discussion regarding adjuvant chemotherapy for his high risk stage II colon cancer. The patient has been tolerating adjuvant Xeloda well. He is he had 1 episode of nausea. He denies any diarrhea. Denies any mucositis. He also has a rash on the dorsum of his hands bilaterally. Diagnosis  · Adenocarcinoma, sigmoid colon, April 2022  · Well to moderately differentiated  · pT4a pN0 cM0, stage II high risk  · High risk features: pT4, LVI  · IHC MMR (MLH1 and PMS2) Intermediate for MMR; No loss of MSH2 and MSH6 by Immunochemistry  · [facility]-negative    Treatment Summary  · 4/8/22- Sigmoid colectomy with negative lymph node dissection by Dr. Raghu Pinzon  · 5/13/22- Initiated adjuvant capecitabine 2 weeks on/1 week off x6-month    Cancer History  Danilo Nicholson was first seen by me on 5/4/2022. He was referred by Dr. Raghu Pinzon. The patient was found to have anemia and further work-up with colonoscopy revealed that he had a large mass in the transverse colon. · 3/18/22 Colonoscopy/EGD by Dr. Kulwant Foy: COLONOSCOPY: An infiltrative partially obstructing large mass was found in the transverse colon--estimated. The mass was circumferential. I could not get the scope through as past this area. Oozing was present. This was biopsied with a cold forceps for histology. Area was tattooed with an injection of Spot (carbon black). No additional abnormalities were found on retroflexion. EGD: Normal esophagus. Non-erosive gastritis. Biopsied. Normal examined duodenum. Biopsied. · 3/18/22 CEA 0.78  · 3/18/22 Colon mass, site not otherwise specified, biopsy: Invasive moderately differentiated colonic adenocarcinoma with ulceration.  Small bowel, biopsy:  Unremarkable small bowel mucosa. · 3/29/22  CT abd/pelvis [facility]): Sigmoid colon mass with extraluminal extension is consistent with primary colon malignancy. No CT evidence of distant metastatic disease in the abdomen or pelvis. No evidence of liver masses. · 3/29/22 CT chest [facility]): No evidence of metastatic disease in the chest. Bridging anterior osteophytes throughout the thoracic spine, suggestive of ankylosing spondylitis. · 4/8/22 Sigmoid colon, sigmoid resection by Dr. Dorene Nichols at [facility]: Well to moderately differentiated colorectal adenocarcinoma (6.3 cm), focally invasive into the visceral peritoneum through area of inflammation. Positive for focal lymphvascular invasion. Negative for perineural invasion. Twenty-two regional lymph nodes, negative for metastatic carcinoma (0/22). Incidental prominent mucosal fold with early superficial hyperplastic changes. Viable opposing surgical resection margins, negative for tumor. pTNM CLASSIFICATION:  pT4a pN0 cM0. Appendix, appendectomy: Appendix with diffuse intraluminal fibrosis and no other significant pathologic abnormalities. Negative for malignancy. · 5/4/2022-he was first seen by me. Essentially, stage II high risk pT4, LVI. IHC MMR was not performed and was ordered today. · 5/15/2022-anticipated adjuvant capecitabine x6-month    Past Medical History:    Diagnosis Date    Ankylosing spondylitis ([facility] [address]    Family history of colon cancer    GERD (gastroesophageal reflux disease)    Gout    Hyperlipidemia    Hypertension    Osteoarthritis    Sleep apnea   Colon cancer, stage II      Past Surgical History:    CHOLECYSTECTOMY   Dr. Shereen Ansari COLONOSCOPY N/A 03/18/2022   Likely malignant partially obstructing tumor in the transverse colon-biopsied-Tattooed; Repeat colonoscopy (date not yet determined) because the examination was incomplete    ENDOSCOPY    ENDOSCOPY N/A 03/18/2022   Normal esophagus; Non-erosive gastritis-biopsied;  Normal examined duodenum-biopsied Social History:    Marital status:   Smoking status:No  ETOH status:No  Resides: [place], [place]    Family History:   Family History   Problem Relation Age of Onset   Butt Cancer Brother         Colon    Cancer Paternal Uncle         Colon   Problem Relation Age of Onset    Cirrhosis Father 52    Alcohol abuse Father    Colon cancer Maternal Aunt   In her 42's    Alcohol abuse Paternal [de-identified]    Colon cancer Paternal Uncle   In his 63's    Alcohol abuse Paternal Uncle    Alcohol abuse Maternal Grandfather    Colon polyps Neg Hx    Esophageal cancer Neg Hx    Liver cancer Neg Hx    Liver disease Neg Hx    Rectal cancer Neg Hx    Stomach cancer Neg Hx       Current Hospital Medications:    Current Outpatient Medications   Medication Sig Dispense Refill    capecitabine (XELODA) 500 MG chemo tablet Take 4 tablets by mouth 2 times a day for for 14 days of a 21 day cycle 112 tablet 5    allopurinol (ZYLOPRIM) 300 MG tablet       lisinopril-hydroCHLOROthiazide (PRINZIDE;ZESTORETIC) 20-25 MG per tablet lisinopril 20 mg-hydrochlorothiazide 25 mg tablet      ENBREL SURECLICK 50 MG/ML SOAJ       rosuvastatin (CRESTOR) 10 MG tablet       folic acid (FOLVITE) 1 MG tablet TAKE 1 TABLET BY MOUTH ONCE DAILY      Dapagliflozin-metFORMIN HCl ER (XIGDUO XR)  MG TB24 Xigduo XR 10 mg-1,000 mg tablet,extended release      atenolol (TENORMIN) 25 MG tablet atenolol 25 mg tablet      capecitabine (XELODA) 500 MG chemo tablet Take 4 tablets (2000 mg) 2 times a day for 14 days of a 21 day cycle 112 tablet 3     No current facility-administered medications for this visit.        Allergies: No Known Allergies      Subjective   REVIEW OF SYSTEMS:   CONSTITUTIONAL: no fever, no night sweats, no fatigue;  HEENT: no blurring of vision, no double vision, no hearing difficulty, no tinnitus, no ulceration, no dysplasia, no epistaxis;   LUNGS: no cough, no hemoptysis, no wheeze,  no shortness of breath;  CARDIOVASCULAR: no palpitation, no chest pain, no shortness of breath;  GI: no abdominal pain, no nausea, no vomiting, no diarrhea, no constipation;  FAINA: no dysuria, no hematuria, no frequency or urgency, no nephrolithiasis;  MUSCULOSKELETAL: no joint pain, no swelling, no stiffness;  ENDOCRINE: no polyuria, no polydipsia, no cold or heat intolerance;  HEMATOLOGY: no easy bruising or bleeding, no history of clotting disorder;  DERMATOLOGY:DISHA hand rash, no eczema, no pruritus;  PSYCHIATRY: no depression, no anxiety, no panic attacks, no suicidal ideation, no homicidal ideation;  NEUROLOGY: no syncope, no seizures, no numbness or tingling of hands, no numbness or tingling of feet, no paresis;     Objective   /74   Pulse 69   Ht 5' 4\" (1.626 m)   Wt 208 lb 6.4 oz (94.5 kg)   SpO2 99%   BMI 35.77 kg/m²      PHYSICAL EXAM:  CONSTITUTIONAL: Alert, appropriate, no acute distress  EYES: Non icteric, EOM intact, pupils equal round   ENT: Mucus membranes moist, no oral pharyngeal lesions, external inspection of ears and nose are normal.  NECK: Supple, no masses. No palpable thyroid mass  CHEST/LUNGS: CTA bilaterally, normal respiratory effort   CARDIOVASCULAR: RRR, no murmurs. No lower extremity edema  ABDOMEN: soft non-tender, active bowel sounds, no HSM. No palpable masses  EXTREMITIES: warm, full ROM in all 4 extremities, no focal weakness. SKIN: warm, dry with no rashes or lesions  LYMPH: No cervical, clavicular, axillary, or inguinal lymphadenopathy  NEUROLOGIC: follows commands, non focal   PSYCH: mood and affect appropriate.   Alert and oriented to time, place, person    LABORATORY RESULTS REVIEWED/ANALYZED BY ME:  6/30/2022 CBC  WBC 7.1  HGB 11.7      RADIOLOGY STUDIES REVIEWED BY ME:  None    ASSESSMENT:    Orders Placed This Encounter   Procedures    Miscellaneous Sendout     Standing Status:   Future     Number of Occurrences:   1     Standing Expiration Date:   6/30/2023     Order Specific Question: Specify Req. Test (1 Test/Order)     Answer: [facility] genetic testing    Comprehensive Metabolic Panel     Standing Status:   Future     Number of Occurrences:   1     Standing Expiration Date:   6/30/2023    CEA     Standing Status:   Future     Number of Occurrences:   1     Standing Expiration Date:   6/30/2023      Eliceo Maciel was seen today for follow-up. Diagnoses and all orders for this visit:    Adenocarcinoma of sigmoid colon ([facility] [address]  -     Miscellaneous Sendout; Future  -     Comprehensive Metabolic Panel; Future  -     CEA; Future    Care plan discussed with patient    Chemotherapy management, encounter for    Adverse effect of chemotherapy, subsequent encounter    Chemotherapy-induced nausea       Stage II, pT4 N0 M0, LVI, IHC MMR pending  Essentially, stage II high risk colon cancer (high risk features: pT4, lymphovascular invasion). IHC MMR is pending. Lengthy discussion about NCCN guidelines, ASCO and risk factors. Stage II high risk colon cancer  High risk features: Lymphovascular invasion, pT4   Discussed NCCN guidelines     Recommended:  Capecitabine 1000 mg/m2 PO twice daily 2 weeks on/1 week of  8 cycles equal 6-month    Iron deficiency anemia-secondary to GI bleed  -Hemoglobin 8.2/MCV 76 on 4/15/2022  -Hemoglobin 11.3/MCV 80 on 5/4/2022  -Hemoglobin 11.7/MCV 84.3 on 6/30/2022  -Recommended iron sulfate 325 mg p.o. daily      Dixon syndrome screening  IHC MMR-could not be determined. No loss of MSH2/MSH6 by IHC  -Indeterminate for MMR (MLH1 and PMS2) by IHC  -Will order [facility] genetic test during next visit.     PLAN:  · RTC with MD Aug 2022  · CBC CMP CEA today  · [facility] today  · Continue Capecitabine 2 weeks on/1 week off  · Continue OTC Ferrous sulfate 325mg daily  · Repeat CT scans every 6 months, Sept 2022  · Encouraged COVID vaccinations      I, Mike Laureano am pre charting  as Medical Assistant for Faye Doshi MD. Electronically signed by Mike Laureano MA on 6/30/2022 at 4:09 PM CDT. Lizy Noriega, am scribing for Marcelo Aceves MD. Electronically signed by Zoraida Posey RN on 6/30/2022 at 12:36 PM CDT. I, Dr Sury Jewell, personally performed the services described in this documentation as scribed by Zoraida Posey RN in my presence and is both accurate and complete. I have seen, examined and reviewed this patient medication list, appropriate labs and imaging studies. I reviewed relevant medical records and others physicians notes. I discussed the plans of care with the patient. I answered all the questions to the patients satisfaction. I have also reviewed the chief complaint (CC) and part of the history (History of Present Illness (HPI), Past Family Social History [facility]), or Review of Systems (ROS) and made changes when appropriated.        (Please note that portions of this note were completed with a voice recognition program. Efforts were made to edit the dictations but occasionally words are mis-transcribed.)    Electronically signed by Marcelo Aceves MD on 6/30/2022 at 4:30 PM

## 2022-06-30 ENCOUNTER — HOSPITAL ENCOUNTER (OUTPATIENT)
Dept: INFUSION THERAPY | Age: 47
Discharge: HOME | End: 2022-06-30
Payer: COMMERCIAL

## 2022-06-30 ENCOUNTER — OFFICE VISIT (OUTPATIENT)
Dept: HEMATOLOGY | Age: 47
End: 2022-06-30
Payer: COMMERCIAL

## 2022-06-30 VITALS
HEART RATE: 69 BPM | WEIGHT: 208.4 LBS | SYSTOLIC BLOOD PRESSURE: 132 MMHG | OXYGEN SATURATION: 99 % | DIASTOLIC BLOOD PRESSURE: 74 MMHG | HEIGHT: 64 IN | BODY MASS INDEX: 35.58 KG/M2

## 2022-06-30 DIAGNOSIS — C18.7 ADENOCARCINOMA OF SIGMOID COLON (HCC): ICD-10-CM

## 2022-06-30 DIAGNOSIS — Z51.11 CHEMOTHERAPY MANAGEMENT, ENCOUNTER FOR: ICD-10-CM

## 2022-06-30 DIAGNOSIS — Z71.89 CARE PLAN DISCUSSED WITH PATIENT: ICD-10-CM

## 2022-06-30 DIAGNOSIS — T45.1X5D ADVERSE EFFECT OF CHEMOTHERAPY, SUBSEQUENT ENCOUNTER: ICD-10-CM

## 2022-06-30 DIAGNOSIS — R11.0 CHEMOTHERAPY-INDUCED NAUSEA: ICD-10-CM

## 2022-06-30 DIAGNOSIS — C18.7 ADENOCARCINOMA OF SIGMOID COLON (HCC): Primary | ICD-10-CM

## 2022-06-30 DIAGNOSIS — T45.1X5A CHEMOTHERAPY-INDUCED NAUSEA: ICD-10-CM

## 2022-06-30 LAB
ALBUMIN SERPL-MCNC: 5 G/DL (ref 3.5–5.2)
ALP BLD-CCNC: 113 U/L (ref 40–130)
ALT SERPL-CCNC: 24 U/L (ref 21–72)
ANION GAP SERPL CALCULATED.3IONS-SCNC: 14 MMOL/L (ref 7–19)
AST SERPL-CCNC: 32 U/L (ref 17–59)
BILIRUB SERPL-MCNC: 0.7 MG/DL (ref 0.2–1.3)
BUN BLDV-MCNC: 25 MG/DL (ref 9–20)
CALCIUM SERPL-MCNC: 10.5 MG/DL (ref 8.4–10.2)
CEA: 1.2 NG/ML (ref 0–4.7)
CHLORIDE BLD-SCNC: 98 MMOL/L (ref 98–111)
CO2: 29 MMOL/L (ref 22–29)
CREAT SERPL-MCNC: 1 MG/DL (ref 0.6–1.2)
GFR NON-AFRICAN AMERICAN: >60
GLUCOSE BLD-MCNC: 93 MG/DL (ref 74–106)
HCT VFR BLD CALC: 39.7 % (ref 40.1–51)
HEMOGLOBIN: 11.7 G/DL (ref 13.7–17.5)
LYMPHOCYTES ABSOLUTE: 1.9 K/UL (ref 1.18–3.74)
LYMPHOCYTES RELATIVE PERCENT: 27.1 % (ref 19.3–53.1)
MCH RBC QN AUTO: 24.8 PG (ref 25.7–32.2)
MCHC RBC AUTO-ENTMCNC: 29.5 G/DL (ref 32.3–36.5)
MCV RBC AUTO: 84.3 FL (ref 79–92.2)
MONOCYTES ABSOLUTE: ABNORMAL K/UL (ref 0.24–0.82)
MONOCYTES RELATIVE PERCENT: ABNORMAL % (ref 4.7–12.5)
NEUTROPHILS ABSOLUTE: ABNORMAL K/UL (ref 1.56–6.13)
NEUTROPHILS RELATIVE PERCENT: ABNORMAL % (ref 34–71.1)
PDW BLD-RTO: 22.9 % (ref 11.6–14.4)
PLATELET # BLD: 221 K/UL (ref 163–337)
PMV BLD AUTO: 9.7 FL (ref 7.4–10.4)
POTASSIUM SERPL-SCNC: 4.3 MMOL/L (ref 3.5–5.1)
RBC # BLD: 4.71 M/UL (ref 4.63–6.08)
SODIUM BLD-SCNC: 141 MMOL/L (ref 137–145)
TOTAL PROTEIN: 9.1 G/DL (ref 6.3–8.2)
WBC # BLD: 7.1 K/UL (ref 4.23–9.07)

## 2022-06-30 PROCEDURE — 99214 OFFICE O/P EST MOD 30 MIN: CPT | Performed by: INTERNAL MEDICINE

## 2022-06-30 PROCEDURE — 85025 COMPLETE CBC W/AUTO DIFF WBC: CPT

## 2022-06-30 PROCEDURE — 99212 OFFICE O/P EST SF 10 MIN: CPT

## 2022-06-30 PROCEDURE — 80053 COMPREHEN METABOLIC PANEL: CPT

## 2022-06-30 PROCEDURE — 36415 COLL VENOUS BLD VENIPUNCTURE: CPT | Performed by: INTERNAL MEDICINE

## 2022-06-30 PROCEDURE — 36415 COLL VENOUS BLD VENIPUNCTURE: CPT

## 2022-08-06 DIAGNOSIS — U07.1 COVID-19 VIRUS INFECTION: Primary | ICD-10-CM

## 2022-08-06 NOTE — PROGRESS NOTES
Patient tested + Covid 19. He is symptomatic with cough and sore throat. Recommended Paxlovid 2 tab q 12 x 5 days.

## 2022-08-11 DIAGNOSIS — C18.7 ADENOCARCINOMA OF SIGMOID COLON (HCC): Primary | ICD-10-CM

## 2022-08-11 RX ORDER — CAPECITABINE 500 MG/1
TABLET, FILM COATED ORAL
Qty: 112 TABLET | Refills: 5 | Status: ACTIVE | OUTPATIENT
Start: 2022-08-11

## 2022-08-12 NOTE — PROGRESS NOTES
[facility] Update    Date: 08/12/22    We do not have access to this patient's insurance plan. This RX has been routed to the previous specialty pharmacy [id number] Prior authorization effective until 5-17-25. Patient was provided with the specialty pharmacy's phone number. Please call us with any questions at [phone] opt.  2.

## 2022-08-29 NOTE — PROGRESS NOTES
MEDICAL ONCOLOGY PROGRESS NOTE    Pt Name: Steve Bowser  MRN: [id number]  YOB: 1975  Date of evaluation: 8/31/2022  History Obtained From:  patient and old medical records    HISTORY OF PRESENT ILLNESS:   The patient presents today for a follow-up visit. 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 He is currently on adjuvant treatment with Xeloda. He is tolerating treatment quite well except for grade 1 diarrhea. Denies any hand-foot syndrome. He was recently diagnosed with COVID. He was prescribed Paxlovid with significant improvement of his symptoms in 2 days. He is now asymptomatic. He is taking Imodium for his diarrhea. He also had a genetic test performed that was negative. Diagnosis  Adenocarcinoma, sigmoid colon, April 2022  Well to moderately differentiated  pT4a pN0 cM0, stage II high risk  High risk features: pT4, LVI  IHC MMR (MLH1 and PMS2) Intermediate for MMR; No loss of MSH2 and MSH6 by Immunochemistry  [facility] 84 gene panel-VUS MuTHY    Treatment Summary  4/8/22- Sigmoid colectomy with negative lymph node dissection by Dr. Blessing Mckenna  5/13/22- Initiated adjuvant capecitabine 2 weeks on/1 week off x6-month    Cancer History  Jose Maria East was first seen by me on 5/4/2022. He was referred by Dr. Blessing Mckenna. The patient was found to have anemia and further work-up with colonoscopy revealed that he had a large mass in the transverse colon. 3/18/22 Colonoscopy/EGD by Dr. So: COLONOSCOPY: An infiltrative partially obstructing large mass was found in the transverse colon--estimated. The mass was circumferential. I could not get the scope through as past this area. Oozing was present. This was biopsied with a cold forceps for histology. Area was tattooed with an injection of Spot (carbon black). No additional abnormalities were found on retroflexion. EGD: Normal esophagus. Non-erosive gastritis. Biopsied. Normal examined duodenum. Biopsied.   3/18/22 CEA 0.78  3/18/22 Colon mass, site not otherwise specified, biopsy: Invasive moderately differentiated colonic adenocarcinoma with ulceration. Small bowel, biopsy:  Unremarkable small bowel mucosa. 3/29/22  CT abd/pelvis [facility]): Sigmoid colon mass with extraluminal extension is consistent with primary colon malignancy. No CT evidence of distant metastatic disease in the abdomen or pelvis. No evidence of liver masses. 3/29/22 CT chest [facility]): No evidence of metastatic disease in the chest. Bridging anterior osteophytes throughout the thoracic spine, suggestive of ankylosing spondylitis. 4/8/22 Sigmoid colon, sigmoid resection by Dr. Liana Boast at [facility]: Well to moderately differentiated colorectal adenocarcinoma (6.3 cm), focally invasive into the visceral peritoneum through area of inflammation. Positive for focal lymphvascular invasion. Negative for perineural invasion. Twenty-two regional lymph nodes, negative for metastatic carcinoma (0/22). Incidental prominent mucosal fold with early superficial hyperplastic changes. Viable opposing surgical resection margins, negative for tumor. pTNM CLASSIFICATION:  pT4a pN0 cM0. Appendix, appendectomy: Appendix with diffuse intraluminal fibrosis and no other significant pathologic abnormalities. Negative for malignancy. 5/4/2022-he was first seen by me. Essentially, stage II high risk pT4, LVI. IHC MMR was not performed and was ordered today. 5/15/2022-anticipated adjuvant capecitabine x6-month  6/30/2022 CEA 1.2  7/30/2022 [facility] 84 Genetic Panel:VUS. No pathogenic mutation.     Past Medical History:    Diagnosis Date    Ankylosing spondylitis ([facility] [address]    Family history of colon cancer    GERD (gastroesophageal reflux disease)    Gout    Hyperlipidemia    Hypertension    Osteoarthritis    Sleep apnea   Colon cancer, stage II      Past Surgical History:    CHOLECYSTECTOMY   Dr. Alejandra Smiley    COLONOSCOPY N/A 03/18/2022   Likely malignant partially obstructing tumor in the transverse colon-biopsied-Tattooed; Repeat colonoscopy (date not yet determined) because the examination was incomplete    ENDOSCOPY    ENDOSCOPY N/A 03/18/2022   Normal esophagus; Non-erosive gastritis-biopsied; Normal examined duodenum-biopsied     Social History:    Marital status:   Smoking status:No  ETOH status:No  Resides: [address] [place] [id number]    Family History:   Family History   Problem Relation Age of Onset    Cancer Brother         Colon    Cancer Paternal Uncle         Colon   Problem Relation Age of Onset    Cirrhosis Father 52    Alcohol abuse Father    Colon cancer Maternal Aunt   In her 42's    Alcohol abuse Paternal Aunt    Colon cancer Paternal Uncle   In his 63's    Alcohol abuse Paternal Uncle    Alcohol abuse Maternal Grandfather    Colon polyps Neg Hx    Esophageal cancer Neg Hx    Liver cancer Neg Hx    Liver disease Neg Hx    Rectal cancer Neg Hx    Stomach cancer Neg Hx       Current Hospital Medications:    Current Outpatient Medications   Medication Sig Dispense Refill    capecitabine (XELODA) 500 MG chemo tablet Take 4 tablets by mouth 2 times a day for for 14 days of a 21 day cycle 112 tablet 5    capecitabine (XELODA) 500 MG chemo tablet Take 4 tablets (2000 mg) 2 times a day for 14 days of a 21 day cycle 112 tablet 3    allopurinol (ZYLOPRIM) 300 MG tablet       lisinopril-hydroCHLOROthiazide (PRINZIDE;ZESTORETIC) 20-25 MG per tablet lisinopril 20 mg-hydrochlorothiazide 25 mg tablet      ENBREL SURECLICK 50 MG/ML SOAJ       rosuvastatin (CRESTOR) 10 MG tablet       folic acid (FOLVITE) 1 MG tablet TAKE 1 TABLET BY MOUTH ONCE DAILY      Dapagliflozin-metFORMIN HCl ER (XIGDUO XR)  MG TB24 Xigduo XR 10 mg-1,000 mg tablet,extended release      atenolol (TENORMIN) 25 MG tablet atenolol 25 mg tablet       No current facility-administered medications for this visit.        Allergies: No Known Allergies      Subjective   REVIEW OF SYSTEMS:   CONSTITUTIONAL: no fever, no night sweats, fatigue;  HEENT: no blurring of vision, no double vision, no hearing difficulty, no tinnitus, no ulceration, no dysplasia, no epistaxis;   LUNGS: no cough, no hemoptysis, no wheeze,  no shortness of breath;  CARDIOVASCULAR: no palpitation, no chest pain, no shortness of breath;  GI: no abdominal pain, no nausea, no vomiting, mild diarrhea (grade 1), no constipation;  FAINA: no dysuria, no hematuria, no frequency or urgency, no nephrolithiasis;  MUSCULOSKELETAL: no joint pain, no swelling, no stiffness;  ENDOCRINE: no polyuria, no polydipsia, no cold or heat intolerance;  HEMATOLOGY: no easy bruising or bleeding, no history of clotting disorder;  DERMATOLOGY:DISHA hand rash, no eczema, no pruritus;  PSYCHIATRY: no depression, no anxiety, no panic attacks, no suicidal ideation, no homicidal ideation;  NEUROLOGY: no syncope, no seizures, no numbness or tingling of hands, no numbness or tingling of feet, no paresis;     Objective   /74   Pulse 76   Wt 217 lb (98.4 kg)   SpO2 98%   BMI 37.25 kg/m²      PHYSICAL EXAM:  CONSTITUTIONAL: Alert, appropriate, no acute distress  EYES: Non icteric, EOM intact, pupils equal round   ENT: Mucus membranes moist, no oral pharyngeal lesions, external inspection of ears and nose are normal.  NECK: Supple, no masses. No palpable thyroid mass  CHEST/LUNGS: CTA bilaterally, normal respiratory effort   CARDIOVASCULAR: RRR, no murmurs. No lower extremity edema  ABDOMEN: soft non-tender, active bowel sounds, no HSM. No palpable masses  EXTREMITIES: warm, full ROM in all 4 extremities, no focal weakness. SKIN: warm, dry with no rashes or lesions  LYMPH: No cervical, clavicular, axillary, or inguinal lymphadenopathy  NEUROLOGIC: follows commands, non focal   PSYCH: mood and affect appropriate.   Alert and oriented to time, place, person    LABORATORY RESULTS REVIEWED/ANALYZED BY ME:  6/30/2022 CEA 1.2    7/30/2022 [facility] 84 Genetic Panel:VUS    Lab Results   Component Value Date WBC 10.19 (H) 08/31/2022    HGB 12.7 (L) 08/31/2022    HCT 40.2 08/31/2022    MCV 98.5 (H) 08/31/2022     08/31/2022     Lab Results   Component Value Date    NEUTROABS 7.37 (H) 08/31/2022       RADIOLOGY STUDIES REVIEWED BY ME:  None    ASSESSMENT:    Orders Placed This Encounter   Procedures    CT CHEST W CONTRAST     Please compare to prior scans     Standing Status:   Future     Standing Expiration Date:   10/31/2022     Order Specific Question:   STAT Creatinine as needed:     Answer:   Yes     Order Specific Question:   Reason for exam:     Answer:   Assess response to treatment    CT ABDOMEN PELVIS W IV CONTRAST Additional Contrast? Oral     Please compare to prior scans     Standing Status:   Future     Standing Expiration Date:   8/31/2023     Order Specific Question:   Additional Contrast?     Answer:   Oral     Order Specific Question:   STAT Creatinine as needed:     Answer:   Yes     Order Specific Question:   Reason for exam:     Answer:   Assess response to treatment    Comprehensive Metabolic Panel     Standing Status:   Future     Standing Expiration Date:   8/31/2023        Oriana Klein was seen today for follow-up. Diagnoses and all orders for this visit:    Adenocarcinoma of sigmoid colon [facility])  -     [address]; Future  -     CT ABDOMEN PELVIS W IV CONTRAST Additional Contrast? Oral; Future  -     Comprehensive Metabolic Panel; Future       Stage II, pT4 N0 M0, LVI, IHC MMR pending  Essentially, stage II high risk colon cancer (high risk features: pT4, lymphovascular invasion). IHC MMR is pending. Lengthy discussion about NCCN guidelines, ASCO and risk factors. Stage II high risk colon cancer  High risk features: Lymphovascular invasion, pT4   Discussed NCCN guidelines     Recommended:  Capecitabine 1000 mg/m2 PO twice daily 2 weeks on/1 week of  8 cycles equal 6-month    -Continue adjuvant capecitabine.   No change in the dosage.  -Repeat CT chest/abdomen/pelvis in 4 mis-transcribed.)  Electronically signed by Nereyda Salazar MD on 8/31/2022 at 9:06 AM

## 2022-08-31 ENCOUNTER — HOSPITAL ENCOUNTER (OUTPATIENT)
Dept: INFUSION THERAPY | Age: 47
Discharge: HOME | End: 2022-08-31
Payer: COMMERCIAL

## 2022-08-31 ENCOUNTER — OFFICE VISIT (OUTPATIENT)
Dept: HEMATOLOGY | Age: 47
End: 2022-08-31
Payer: COMMERCIAL

## 2022-08-31 VITALS
BODY MASS INDEX: 37.25 KG/M2 | SYSTOLIC BLOOD PRESSURE: 110 MMHG | DIASTOLIC BLOOD PRESSURE: 74 MMHG | WEIGHT: 217 LBS | OXYGEN SATURATION: 98 % | HEART RATE: 76 BPM

## 2022-08-31 DIAGNOSIS — T45.1X5A CHEMOTHERAPY INDUCED DIARRHEA: ICD-10-CM

## 2022-08-31 DIAGNOSIS — C18.7 ADENOCARCINOMA OF SIGMOID COLON (HCC): Primary | ICD-10-CM

## 2022-08-31 DIAGNOSIS — D64.9 NORMOCYTIC ANEMIA: ICD-10-CM

## 2022-08-31 DIAGNOSIS — T45.1X5D ADVERSE EFFECT OF CHEMOTHERAPY, SUBSEQUENT ENCOUNTER: ICD-10-CM

## 2022-08-31 DIAGNOSIS — Z51.11 CHEMOTHERAPY MANAGEMENT, ENCOUNTER FOR: ICD-10-CM

## 2022-08-31 DIAGNOSIS — C18.7 ADENOCARCINOMA OF SIGMOID COLON (HCC): ICD-10-CM

## 2022-08-31 DIAGNOSIS — K52.1 CHEMOTHERAPY INDUCED DIARRHEA: ICD-10-CM

## 2022-08-31 LAB
ALBUMIN SERPL-MCNC: 4.9 G/DL (ref 3.5–5.2)
ALP BLD-CCNC: 96 U/L (ref 40–130)
ALT SERPL-CCNC: 23 U/L (ref 21–72)
ANION GAP SERPL CALCULATED.3IONS-SCNC: 12 MMOL/L (ref 7–19)
AST SERPL-CCNC: 23 U/L (ref 17–59)
BASOPHILS ABSOLUTE: 0.08 K/UL (ref 0.01–0.08)
BASOPHILS RELATIVE PERCENT: 0.8 % (ref 0.1–1.2)
BILIRUB SERPL-MCNC: 0.8 MG/DL (ref 0.2–1.3)
BUN BLDV-MCNC: 25 MG/DL (ref 9–20)
CALCIUM SERPL-MCNC: 9.5 MG/DL (ref 8.4–10.2)
CHLORIDE BLD-SCNC: 96 MMOL/L (ref 98–111)
CO2: 30 MMOL/L (ref 22–29)
CREAT SERPL-MCNC: 1 MG/DL (ref 0.6–1.2)
EOSINOPHILS ABSOLUTE: 0.28 K/UL (ref 0.04–0.54)
EOSINOPHILS RELATIVE PERCENT: 2.7 % (ref 0.7–7)
GFR NON-AFRICAN AMERICAN: >60
GLOBULIN: 3.9 G/DL
GLUCOSE BLD-MCNC: 183 MG/DL (ref 74–106)
HCT VFR BLD CALC: 40.2 % (ref 40.1–51)
HEMOGLOBIN: 12.7 G/DL (ref 13.7–17.5)
LYMPHOCYTES ABSOLUTE: 1.54 K/UL (ref 1.18–3.74)
LYMPHOCYTES RELATIVE PERCENT: 15.1 % (ref 19.3–53.1)
MCH RBC QN AUTO: 31.1 PG (ref 25.7–32.2)
MCHC RBC AUTO-ENTMCNC: 31.6 G/DL (ref 32.3–36.5)
MCV RBC AUTO: 98.5 FL (ref 79–92.2)
MONOCYTES ABSOLUTE: 0.88 K/UL (ref 0.24–0.82)
MONOCYTES RELATIVE PERCENT: 8.6 % (ref 4.7–12.5)
NEUTROPHILS ABSOLUTE: 7.37 K/UL (ref 1.56–6.13)
NEUTROPHILS RELATIVE PERCENT: 72.4 % (ref 34–71.1)
PDW BLD-RTO: 23.2 % (ref 11.6–14.4)
PLATELET # BLD: 278 K/UL (ref 163–337)
PMV BLD AUTO: 10.7 FL (ref 7.4–10.4)
POTASSIUM SERPL-SCNC: 4.4 MMOL/L (ref 3.5–5.1)
RBC # BLD: 4.08 M/UL (ref 4.63–6.08)
SODIUM BLD-SCNC: 138 MMOL/L (ref 137–145)
TOTAL PROTEIN: 8.8 G/DL (ref 6.3–8.2)
WBC # BLD: 10.19 K/UL (ref 4.23–9.07)

## 2022-08-31 PROCEDURE — 80053 COMPREHEN METABOLIC PANEL: CPT

## 2022-08-31 PROCEDURE — 36415 COLL VENOUS BLD VENIPUNCTURE: CPT

## 2022-08-31 PROCEDURE — 99214 OFFICE O/P EST MOD 30 MIN: CPT | Performed by: INTERNAL MEDICINE

## 2022-08-31 PROCEDURE — 85025 COMPLETE CBC W/AUTO DIFF WBC: CPT

## 2022-08-31 PROCEDURE — 99212 OFFICE O/P EST SF 10 MIN: CPT

## 2022-09-29 ENCOUNTER — OFFICE VISIT (OUTPATIENT)
Dept: GASTROENTEROLOGY | Facility: CLINIC | Age: 47
End: 2022-09-29

## 2022-09-29 VITALS
TEMPERATURE: 97.3 F | HEIGHT: 64 IN | WEIGHT: 228 LBS | OXYGEN SATURATION: 97 % | HEART RATE: 81 BPM | DIASTOLIC BLOOD PRESSURE: 72 MMHG | BODY MASS INDEX: 38.93 KG/M2 | SYSTOLIC BLOOD PRESSURE: 126 MMHG

## 2022-09-29 DIAGNOSIS — C18.9 ADENOCARCINOMA, COLON: Primary | ICD-10-CM

## 2022-09-29 DIAGNOSIS — D50.0 IRON DEFICIENCY ANEMIA DUE TO CHRONIC BLOOD LOSS: ICD-10-CM

## 2022-09-29 PROCEDURE — 99214 OFFICE O/P EST MOD 30 MIN: CPT | Performed by: INTERNAL MEDICINE

## 2022-09-29 RX ORDER — CAPECITABINE 500 MG/1
4 TABLET, FILM COATED ORAL 2 TIMES DAILY
COMMUNITY
Start: 2022-05-18

## 2022-09-29 RX ORDER — MEDROXYPROGESTERONE ACETATE 150 MG/ML
1 INJECTION, SUSPENSION INTRAMUSCULAR WEEKLY
COMMUNITY
Start: 2022-09-20

## 2022-09-29 RX ORDER — FERROUS SULFATE 325(65) MG
1 TABLET ORAL DAILY
COMMUNITY

## 2022-09-29 NOTE — PROGRESS NOTES
Primary Physician: Cristian Burciaga MD    Chief Complaint   Patient presents with   • Follow-up     Pt presents today for colon cancer follow up-had colon resection by Dr. Aragon 4/8/2022-pt states he is currently still undergoing chemo and seems to be doing well       Subjective     Alicia Macedo is a 47 y.o. male.    HPI   Colon cancer-adenocarcinoma of the sigmoid colon  First colonoscopy 3/2022 for evaluation of iron deficiency anemia showed near obstructing colon mass was seen in what was estimated to be in the transverse colon, but CT shows to be sigmoid colon.  Unable to traverse to cecum for landmarks.  Site marked with ink distally.  Pathology confirms adenocarcinoma.  CT TAP doesn't show metastatic disease.  LFTs normal.  CEA normal.  Family history negative for colon cancer or colon polyps.    Left colon resection done by Dr. Aragon 4/2022.  Final path shows stage 2 disease.  He was since undergone ~5 cycles of 8 chemo cycles planned through Dr. Castellanos.     Iron deficiency anemia  Due to colon adenocarcinoma found on colonoscopy 3/2022.  Hb 11.3 in 5/2022.  Hb improved to 12.7 8/31/2022      Past Medical History:   Diagnosis Date   • Ankylosing spondylitis (HCC)    • Anxiety    • Diabetes mellitus (HCC)    • Family history of colon cancer    • GERD (gastroesophageal reflux disease)    • Gout    • History of colon cancer    • Hyperlipidemia    • Hypertension    • Kidney stone    • Osteoarthritis    • Sleep apnea        Past Surgical History:   Procedure Laterality Date   • CHOLECYSTECTOMY      Dr. Aragon   • COLON RESECTION N/A 04/08/2022    Procedure: HAND ASSISTED LAPAROSCOPIC SIGMOID COLECTOMY;  Surgeon: Noreen Aragon MD;  Location: [facility];  Service: General;  Laterality: N/A;   • COLONOSCOPY N/A 03/18/2022    Likely malignant partially obstructing tumor in the transverse colon-biopsied-Tattooed; Repeat colonoscopy (date not yet determined) because the examination was incomplete   •  ENDOSCOPY N/A 03/18/2022    Normal esophagus; Non-erosive gastritis-biopsied; Normal examined duodenum-biopsied        Current Outpatient Medications:   •  allopurinol (ZYLOPRIM) 300 MG tablet, Take 1 tablet by mouth Daily., Disp: , Rfl:   •  atenolol (TENORMIN) 25 MG tablet, Take 1 tablet by mouth Daily., Disp: , Rfl:   •  capecitabine (XELODA) 500 MG chemo tablet, Take 4 tablets by mouth 2 (Two) Times a Day., Disp: , Rfl:   •  dapagliflozin-metformin HCl ER (XIGDUO XR)  MG tablet, Take 1 tablet by mouth Daily., Disp: , Rfl:   •  Enbrel SureClick 50 MG/ML solution auto-injector, Inject 1 mL under the skin into the appropriate area as directed 1 (One) Time Per Week., Disp: , Rfl:   •  ferrous sulfate 325 (65 FE) MG tablet, Take 1 tablet by mouth Daily., Disp: , Rfl:   •  folic acid (FOLVITE) 1 MG tablet, Take 1,000 mcg by mouth Daily., Disp: , Rfl:   •  lisinopril-hydrochlorothiazide (PRINZIDE,ZESTORETIC) 20-25 MG per tablet, Take 1 tablet by mouth Daily., Disp: , Rfl:   •  predniSONE (DELTASONE) 5 MG tablet, Take 1 tablet by mouth Daily., Disp: , Rfl:   •  rosuvastatin (CRESTOR) 10 MG tablet, Take 1 tablet by mouth Daily., Disp: , Rfl:   •  Sodium Sulfate-Mag Sulfate-KCl 9490-378-605 MG tablet, Take 12 tablets by mouth Take As Directed for 1 day., Disp: 24 tablet, Rfl: 0    No Known Allergies    Social History     Socioeconomic History   • Marital status:    Tobacco Use   • Smoking status: Never Smoker   • Smokeless tobacco: Never Used   Vaping Use   • Vaping Use: Never used   Substance and Sexual Activity   • Alcohol use: Never   • Drug use: Never   • Sexual activity: Yes     Partners: Female     Birth control/protection: None       Family History   Problem Relation Age of Onset   • Cirrhosis Father 47   • Alcohol abuse Father    • Colon cancer Maternal Aunt         In her 40's   • Alcohol abuse Paternal Aunt    • Colon cancer Paternal Uncle         In his 60's   • Alcohol abuse Paternal Uncle    •  "Alcohol abuse Maternal Grandfather    • Colon polyps Neg Hx    • Esophageal cancer Neg Hx    • Liver cancer Neg Hx    • Liver disease Neg Hx    • Rectal cancer Neg Hx    • Stomach cancer Neg Hx        Review of Systems   Respiratory: Negative for shortness of breath.    Cardiovascular: Negative for chest pain.       Objective     /72 (BP Location: Left arm, Patient Position: Sitting, Cuff Size: Adult)   Pulse 81   Temp 97.3 °F (36.3 °C) (Infrared)   Ht 162.6 cm (64\")   Wt 103 kg (228 lb)   SpO2 97%   BMI 39.14 kg/m²     Physical Exam  Constitutional:       Appearance: He is well-developed.   Pulmonary:      Effort: Pulmonary effort is normal.   Musculoskeletal:         General: Normal range of motion.   Skin:     General: Skin is warm.   Neurological:      Mental Status: He is alert and oriented to person, place, and time.   Psychiatric:         Behavior: Behavior normal.         Lab Results - Last 18 Months   Lab Units 08/31/22  0947 06/30/22  1323 04/15/22  0603 04/14/22  0705 04/13/22  0618 04/12/22  0630 04/09/22  0650 04/06/22  1541 03/18/22  1042   GLUCOSE mg/dL 183* 93 106* 93 74 80   < > 95 103*   BUN mg/dL 25* 25* 2* 4* 8 11   < > 24* 19   CREATININE mg/dL 1 1 0.76 0.82 0.79 0.95   < > 1.02 0.93   SODIUM mmol/L 138 141 137 137 136 136   < > 136 136   POTASSIUM mmol/L 4.4 4.3 3.9 3.9 4.1 4.1   < > 4.4 4.1   CHLORIDE mmol/L 96* 98 100 99 96* 95*   < > 97* 95*   TOTAL CO2 mmol/L 30* 29  --   --   --   --   --   --   --    CO2 mmol/L  --   --  27.0 23.0 26.0 25.0   < > 26.0 29.0   TOTAL PROTEIN g/dL 8.8* 9.1*  --   --   --   --   --  8.4 8.2   ALBUMIN g/dL 4.9 5  --   --   --   --   --  4.20 4.20   ALT (SGPT) U/L 23 24  --   --   --   --   --  10 9   AST (SGOT) U/L 23 32  --   --   --   --   --  19 14   ALK PHOS U/L 96 113  --   --   --   --   --  85 93   BILIRUBIN mg/dL 0.8 0.7  --   --   --   --   --  0.2 0.4   GLOBULIN g/dL 3.9  --   --   --   --   --   --  4.2 4.0    < > = values in this " interval not displayed.       Lab Results - Last 18 Months   Lab Units 08/31/22  0835 06/30/22  1202 05/04/22  1331 04/15/22  0603 04/14/22  0705 04/13/22  0618   HEMOGLOBIN g/dL 12.7* 11.7* 11.3* 8.2* 8.1* 7.6*   HEMATOCRIT % 40.2 39.7* 40.1 28.5* 28.6* 26.6*   MCV fL 98.5* 84.3 80.2 76.2* 76.5* 76.7*   WBC K/uL 10.19* 7.10 10.60* 4.90 4.20 4.64   RDW % 23.2* 22.9* 22.6* 18.4* 17.0* 17.0*   MPV fL 10.7* 9.7 11.0* 10.2 9.7 9.8   PLATELETS K/uL 278 221 311 290 287 300           IMPRESSION/PLAN:    Assessment & Plan      Problem List Items Addressed This Visit        Hematology and Neoplasia    Iron deficiency anemia due to chronic blood loss    Overview     Due to colon adenocarcinoma found on colonoscopy 3/2022.  Hb 11.3 in 5/2022.  Hb improved to 12.7 8/31/2022         Relevant Medications    ferrous sulfate 325 (65 FE) MG tablet    Adenocarcinoma, colon (HCC) - Primary    Overview     First colonoscopy 3/2022 for evaluation of iron deficiency anemia showed ear obstructing colon mass was seen in what was estimated to be in the transverse colon, but CT shows to be sigmoid colon.  Unable to traverse to cecum for landmarks.  Site marked with ink distally.  Pathology confirms adenocarcinoma. LFTs normal.  CEA normal.     Final path:  Final Diagnosis   1.  Sigmoid colon, sigmoid resection:               -Well to moderately differentiated colorectal adenocarcinoma (6.3 cm), focally invasive into the visceral peritoneum through area of inflammation.               -Positive for focal lymphvascular invasion.               -Negative for perineural invasion.               -Twenty-two regional lymph nodes, negative for metastatic carcinoma (0/22).               -Incidental prominent mucosal fold with early superficial hyperplastic changes.               -Viable opposing surgical resection margins, negative for tumor.    pTNM CLASSIFICATION:  pT4a pN0 pM (NOT APPLICABLE)     He didn't have a complete colonoscopy due to mass.   Now surgically corrected.  He will be finishing chemo in Nov.  Plan colonoscopy in December.          Current Assessment & Plan     The risks, benefits, and alternatives of colonoscopy were reviewed with the patient today.  Risks including perforation of the colon possibly requiring surgery or colostomy.  Additional risks include risk of bleeding from biopsies or removal of colon tissue.  There is also the risk of a drug reaction or problems with anesthesia.  This will be discussed with the further by the anesthesia team on the day of the procedure.  Lastly there is a possibility of missing a colon polyp or cancer.  The benefits include the diagnosis and management of disease of the colon and rectum.  Alternatives to colonoscopy include barium enema, laboratory testing, radiographic evaluation, or no intervention.  The patient verbalizes understanding and agrees.    In accordance with requirements under the Affordable Care Act, [facility] has provided pricing for all hospital services and items on each of its websites. However, a patient's actual cost may differ based on the services the patient receives to meet individual healthcare needs and based on the benefits provided under the patient’s insurance coverage.           Relevant Medications    capecitabine (XELODA) 500 MG chemo tablet    ferrous sulfate 325 (65 FE) MG tablet    Sodium Sulfate-Mag Sulfate-KCl 5241-995-391 MG tablet    Other Relevant Orders    Case Request (Completed)    Obtain Informed Consent        MEDICAL COMPLEXITY must have 2 out of 3   Moderate Complexity Level 4                                                                                                              1 of the following medical problems:                                                                                               []One chronic illness with mild exacerbation                                                                                [x]Two or more  stable chronic illness                                                                                              []One new problem  []One acute illness with systemic symptoms     Complexity of Data  Reviewed (1 out of the 3 following categories)                                             Category 1 tests, documents, historian (must have 3 points)                                                      []Review of prior external records  [x]Review of results of unique tests  []Ordering unique tests   []Assessment requires an independent historian   Category 2 Interpretation of tests     []Independent interpretation of test read by another doc   Category 3 Discuss Management/tests  []Discussion with external physician     Risk of complications and/or morbidity                                                                                           []Prescription Drug Management     [x]Decision for elective endoscopic procedure                      Deb Olson MD  09/29/22  14:51 CDT    Much of this encounter note is an electronic transcription/translation of spoken language to printed text. The electronic translation of spoken language may permit erroneous, or at times, nonsensical words or phrases to be inadvertently transcribed; although I have reviewed the note for such errors, some may still exist.

## 2022-09-29 NOTE — ASSESSMENT & PLAN NOTE
The risks, benefits, and alternatives of colonoscopy were reviewed with the patient today.  Risks including perforation of the colon possibly requiring surgery or colostomy.  Additional risks include risk of bleeding from biopsies or removal of colon tissue.  There is also the risk of a drug reaction or problems with anesthesia.  This will be discussed with the further by the anesthesia team on the day of the procedure.  Lastly there is a possibility of missing a colon polyp or cancer.  The benefits include the diagnosis and management of disease of the colon and rectum.  Alternatives to colonoscopy include barium enema, laboratory testing, radiographic evaluation, or no intervention.  The patient verbalizes understanding and agrees.    In accordance with requirements under the Affordable Care Act, [facility] has provided pricing for all hospital services and items on each of its websites. However, a patient's actual cost may differ based on the services the patient receives to meet individual healthcare needs and based on the benefits provided under the patient’s insurance coverage.

## 2022-10-11 ENCOUNTER — HOSPITAL ENCOUNTER (OUTPATIENT)
Dept: CT IMAGING | Age: 47
Discharge: HOME | DRG: 176 | End: 2022-10-11
Payer: COMMERCIAL

## 2022-10-11 DIAGNOSIS — C18.7 ADENOCARCINOMA OF SIGMOID COLON (HCC): ICD-10-CM

## 2022-10-11 PROCEDURE — 71260 CT THORAX DX C+: CPT

## 2022-10-11 PROCEDURE — 6360000004 HC RX CONTRAST MEDICATION: Performed by: INTERNAL MEDICINE

## 2022-10-11 PROCEDURE — 74177 CT ABD & PELVIS W/CONTRAST: CPT

## 2022-10-11 RX ADMIN — IOPAMIDOL 75 ML: 755 INJECTION, SOLUTION INTRAVENOUS at 08:52

## 2022-10-13 ENCOUNTER — HOSPITAL ENCOUNTER (INPATIENT)
Age: 47
LOS: 2 days | Discharge: HOME | DRG: 176 | End: 2022-10-15
Attending: EMERGENCY MEDICINE | Admitting: FAMILY MEDICINE
Payer: COMMERCIAL

## 2022-10-13 ENCOUNTER — TELEPHONE (OUTPATIENT)
Dept: HEMATOLOGY | Age: 47
End: 2022-10-13

## 2022-10-13 DIAGNOSIS — I26.99 BILATERAL PULMONARY EMBOLISM (HCC): Primary | ICD-10-CM

## 2022-10-13 DIAGNOSIS — Z85.038 HISTORY OF COLON CANCER: ICD-10-CM

## 2022-10-13 LAB
ALBUMIN SERPL-MCNC: 5 G/DL (ref 3.5–5.2)
ALP BLD-CCNC: 101 U/L (ref 40–130)
ALT SERPL-CCNC: 33 U/L (ref 5–41)
ANION GAP SERPL CALCULATED.3IONS-SCNC: 13 MMOL/L (ref 7–19)
APTT: 26.3 SEC (ref 26–36.2)
APTT: 80.1 SEC (ref 26–36.2)
AST SERPL-CCNC: 34 U/L (ref 5–40)
BASOPHILS ABSOLUTE: 0.1 K/UL (ref 0–0.2)
BASOPHILS RELATIVE PERCENT: 1.1 % (ref 0–1)
BILIRUB SERPL-MCNC: 0.6 MG/DL (ref 0.2–1.2)
BUN BLDV-MCNC: 16 MG/DL (ref 6–20)
CALCIUM SERPL-MCNC: 10.1 MG/DL (ref 8.6–10)
CHLORIDE BLD-SCNC: 99 MMOL/L (ref 98–111)
CO2: 31 MMOL/L (ref 22–29)
CREAT SERPL-MCNC: 0.8 MG/DL (ref 0.5–1.2)
EOSINOPHILS ABSOLUTE: 0.4 K/UL (ref 0–0.6)
EOSINOPHILS RELATIVE PERCENT: 3.7 % (ref 0–5)
GFR AFRICAN AMERICAN: >59
GFR NON-AFRICAN AMERICAN: >60
GLUCOSE BLD-MCNC: 110 MG/DL (ref 70–99)
GLUCOSE BLD-MCNC: 125 MG/DL (ref 74–109)
GLUCOSE BLD-MCNC: 135 MG/DL (ref 70–99)
HCT VFR BLD CALC: 42 % (ref 42–52)
HEMOGLOBIN: 14 G/DL (ref 14–18)
IMMATURE GRANULOCYTES #: 0.1 K/UL
INR BLD: 0.89 (ref 0.88–1.18)
LYMPHOCYTES ABSOLUTE: 2.2 K/UL (ref 1.1–4.5)
LYMPHOCYTES RELATIVE PERCENT: 20.5 % (ref 20–40)
MCH RBC QN AUTO: 34.5 PG (ref 27–31)
MCHC RBC AUTO-ENTMCNC: 33.3 G/DL (ref 33–37)
MCV RBC AUTO: 103.4 FL (ref 80–94)
MONOCYTES ABSOLUTE: 1.2 K/UL (ref 0–0.9)
MONOCYTES RELATIVE PERCENT: 10.8 % (ref 0–10)
NEUTROPHILS ABSOLUTE: 6.9 K/UL (ref 1.5–7.5)
NEUTROPHILS RELATIVE PERCENT: 62.9 % (ref 50–65)
PDW BLD-RTO: 21.7 % (ref 11.5–14.5)
PERFORMED ON: ABNORMAL
PERFORMED ON: ABNORMAL
PLATELET # BLD: 275 K/UL (ref 130–400)
PMV BLD AUTO: 10.5 FL (ref 9.4–12.4)
POTASSIUM SERPL-SCNC: 3.9 MMOL/L (ref 3.5–5)
PRO-BNP: 25 PG/ML (ref 0–450)
PROTHROMBIN TIME: 11.9 SEC (ref 12–14.6)
RBC # BLD: 4.06 M/UL (ref 4.7–6.1)
SARS-COV-2, NAAT: NOT DETECTED
SODIUM BLD-SCNC: 143 MMOL/L (ref 136–145)
TOTAL PROTEIN: 8.3 G/DL (ref 6.6–8.7)
TROPONIN: <0.01 NG/ML (ref 0–0.03)
WBC # BLD: 10.9 K/UL (ref 4.8–10.8)

## 2022-10-13 PROCEDURE — 99223 1ST HOSP IP/OBS HIGH 75: CPT | Performed by: INTERNAL MEDICINE

## 2022-10-13 PROCEDURE — 84484 ASSAY OF TROPONIN QUANT: CPT

## 2022-10-13 PROCEDURE — 80053 COMPREHEN METABOLIC PANEL: CPT

## 2022-10-13 PROCEDURE — 96374 THER/PROPH/DIAG INJ IV PUSH: CPT

## 2022-10-13 PROCEDURE — 6360000002 HC RX W HCPCS: Performed by: EMERGENCY MEDICINE

## 2022-10-13 PROCEDURE — 83880 ASSAY OF NATRIURETIC PEPTIDE: CPT

## 2022-10-13 PROCEDURE — 85730 THROMBOPLASTIN TIME PARTIAL: CPT

## 2022-10-13 PROCEDURE — 93005 ELECTROCARDIOGRAM TRACING: CPT | Performed by: EMERGENCY MEDICINE

## 2022-10-13 PROCEDURE — 85025 COMPLETE CBC W/AUTO DIFF WBC: CPT

## 2022-10-13 PROCEDURE — 85610 PROTHROMBIN TIME: CPT

## 2022-10-13 PROCEDURE — 93970 EXTREMITY STUDY: CPT

## 2022-10-13 PROCEDURE — 82947 ASSAY GLUCOSE BLOOD QUANT: CPT

## 2022-10-13 PROCEDURE — 36415 COLL VENOUS BLD VENIPUNCTURE: CPT

## 2022-10-13 PROCEDURE — 99285 EMERGENCY DEPT VISIT HI MDM: CPT

## 2022-10-13 PROCEDURE — 87635 SARS-COV-2 COVID-19 AMP PRB: CPT

## 2022-10-13 PROCEDURE — 2140000000 HC CCU INTERMEDIATE R&B

## 2022-10-13 RX ORDER — SODIUM CHLORIDE 0.9 % (FLUSH) 0.9 %
5-40 SYRINGE (ML) INJECTION EVERY 12 HOURS SCHEDULED
Status: DISCONTINUED | OUTPATIENT
Start: 2022-10-13 | End: 2022-10-15 | Stop reason: HOSPADM

## 2022-10-13 RX ORDER — ACETAMINOPHEN 650 MG/1
650 SUPPOSITORY RECTAL EVERY 6 HOURS PRN
Status: DISCONTINUED | OUTPATIENT
Start: 2022-10-13 | End: 2022-10-15 | Stop reason: HOSPADM

## 2022-10-13 RX ORDER — POLYETHYLENE GLYCOL 3350 17 G/17G
17 POWDER, FOR SOLUTION ORAL DAILY PRN
Status: DISCONTINUED | OUTPATIENT
Start: 2022-10-13 | End: 2022-10-15 | Stop reason: HOSPADM

## 2022-10-13 RX ORDER — HEPARIN SODIUM 1000 [USP'U]/ML
80 INJECTION, SOLUTION INTRAVENOUS; SUBCUTANEOUS PRN
Status: DISCONTINUED | OUTPATIENT
Start: 2022-10-13 | End: 2022-10-14 | Stop reason: ALTCHOICE

## 2022-10-13 RX ORDER — ACETAMINOPHEN 325 MG/1
650 TABLET ORAL EVERY 4 HOURS PRN
Status: DISCONTINUED | OUTPATIENT
Start: 2022-10-13 | End: 2022-10-13

## 2022-10-13 RX ORDER — FAMOTIDINE 20 MG/1
20 TABLET, FILM COATED ORAL 2 TIMES DAILY
Status: DISCONTINUED | OUTPATIENT
Start: 2022-10-13 | End: 2022-10-15 | Stop reason: HOSPADM

## 2022-10-13 RX ORDER — SODIUM CHLORIDE 0.9 % (FLUSH) 0.9 %
5-40 SYRINGE (ML) INJECTION PRN
Status: DISCONTINUED | OUTPATIENT
Start: 2022-10-13 | End: 2022-10-15 | Stop reason: HOSPADM

## 2022-10-13 RX ORDER — ONDANSETRON 4 MG/1
4 TABLET, ORALLY DISINTEGRATING ORAL EVERY 8 HOURS PRN
Status: DISCONTINUED | OUTPATIENT
Start: 2022-10-13 | End: 2022-10-13

## 2022-10-13 RX ORDER — SODIUM CHLORIDE 9 MG/ML
INJECTION, SOLUTION INTRAVENOUS PRN
Status: DISCONTINUED | OUTPATIENT
Start: 2022-10-13 | End: 2022-10-15 | Stop reason: HOSPADM

## 2022-10-13 RX ORDER — ONDANSETRON 2 MG/ML
4 INJECTION INTRAMUSCULAR; INTRAVENOUS EVERY 6 HOURS PRN
Status: DISCONTINUED | OUTPATIENT
Start: 2022-10-13 | End: 2022-10-15 | Stop reason: HOSPADM

## 2022-10-13 RX ORDER — HEPARIN SODIUM 10000 [USP'U]/100ML
5-30 INJECTION, SOLUTION INTRAVENOUS CONTINUOUS
Status: DISCONTINUED | OUTPATIENT
Start: 2022-10-13 | End: 2022-10-14

## 2022-10-13 RX ORDER — ACETAMINOPHEN 325 MG/1
650 TABLET ORAL EVERY 6 HOURS PRN
Status: DISCONTINUED | OUTPATIENT
Start: 2022-10-13 | End: 2022-10-15 | Stop reason: HOSPADM

## 2022-10-13 RX ORDER — ONDANSETRON 4 MG/1
4 TABLET, ORALLY DISINTEGRATING ORAL EVERY 8 HOURS PRN
Status: DISCONTINUED | OUTPATIENT
Start: 2022-10-13 | End: 2022-10-15 | Stop reason: HOSPADM

## 2022-10-13 RX ORDER — ONDANSETRON 2 MG/ML
4 INJECTION INTRAMUSCULAR; INTRAVENOUS EVERY 6 HOURS PRN
Status: DISCONTINUED | OUTPATIENT
Start: 2022-10-13 | End: 2022-10-13

## 2022-10-13 RX ORDER — ENOXAPARIN SODIUM 100 MG/ML
1 INJECTION SUBCUTANEOUS ONCE
Status: DISCONTINUED | OUTPATIENT
Start: 2022-10-13 | End: 2022-10-13

## 2022-10-13 RX ORDER — HEPARIN SODIUM 1000 [USP'U]/ML
40 INJECTION, SOLUTION INTRAVENOUS; SUBCUTANEOUS PRN
Status: DISCONTINUED | OUTPATIENT
Start: 2022-10-13 | End: 2022-10-14 | Stop reason: ALTCHOICE

## 2022-10-13 RX ORDER — HEPARIN SODIUM 1000 [USP'U]/ML
80 INJECTION, SOLUTION INTRAVENOUS; SUBCUTANEOUS ONCE
Status: COMPLETED | OUTPATIENT
Start: 2022-10-13 | End: 2022-10-13

## 2022-10-13 RX ADMIN — HEPARIN SODIUM 18 UNITS/KG/HR: 10000 INJECTION, SOLUTION INTRAVENOUS at 16:08

## 2022-10-13 RX ADMIN — HEPARIN SODIUM 8170 UNITS: 1000 INJECTION INTRAVENOUS; SUBCUTANEOUS at 14:46

## 2022-10-13 RX ADMIN — HEPARIN SODIUM 18 UNITS/KG/HR: 10000 INJECTION, SOLUTION INTRAVENOUS at 14:50

## 2022-10-13 ASSESSMENT — ENCOUNTER SYMPTOMS
COUGH: 0
VOMITING: 0
RHINORRHEA: 0
VOICE CHANGE: 0
DIARRHEA: 0
SHORTNESS OF BREATH: 0
EYE REDNESS: 0
EYE PAIN: 0
ABDOMINAL PAIN: 0

## 2022-10-13 ASSESSMENT — LIFESTYLE VARIABLES
HOW OFTEN DO YOU HAVE A DRINK CONTAINING ALCOHOL: NEVER
HOW MANY STANDARD DRINKS CONTAINING ALCOHOL DO YOU HAVE ON A TYPICAL DAY: PATIENT DOES NOT DRINK

## 2022-10-13 ASSESSMENT — PAIN - FUNCTIONAL ASSESSMENT: PAIN_FUNCTIONAL_ASSESSMENT: NONE - DENIES PAIN

## 2022-10-13 NOTE — ED PROVIDER NOTES
[facility] [address]      Pt Name: Nabeel Arenas  MRN: [id number]  [place] 1975  Date of evaluation: 10/13/2022  Provider: Isamar Salinas MD    CHIEF COMPLAINT       Chief Complaint   Patient presents with    Pulmonary Embolism     Dx from [facility] results today         HISTORY OF PRESENT ILLNESS   (Location/Symptom, Timing/Onset,Context/Setting, Quality, Duration, Modifying Factors, Severity)  Note limiting factors. Nabeel Arenas is a 50 y.o. male who presents to the emergency department for evaluation after outpatient CT performed earlier this week incidentally showed bilateral pulmonary emboli. Patient denies any recent shortness of breath, chest pain, lightheadedness/weakness, or other symptoms. No leg swelling recently. No known personal or family history of DVT/PE. Has been traveling for work. Was in [facility] when he received to the results of the CT and was advised to come back home to be further evaluated by his oncologist.  [facility] he was traveling about a month ago and had several flights at that time but does not recall having any symptoms then. He is currently under care of Dr. Sheridan Butler with oncology for colon adenocarcinoma. Seems to be responding well to treatment. Has 2 rounds of chemotherapy left. HPI    NursingNotes were reviewed. REVIEW OF SYSTEMS    (2-9 systems for level 4, 10 or more for level 5)     Review of Systems   Constitutional:  Negative for fatigue and fever. HENT:  Negative for congestion, rhinorrhea and voice change. Eyes:  Negative for pain and redness. Respiratory:  Negative for cough and shortness of breath. Cardiovascular:  Negative for chest pain. Gastrointestinal:  Negative for abdominal pain, diarrhea and vomiting. Endocrine: Negative. Genitourinary: Negative. Musculoskeletal:  Negative for arthralgias and gait problem. Skin:  Negative for rash and wound.    Neurological:  Negative for weakness and headaches. Hematological: Negative. Psychiatric/Behavioral: Negative. All other systems reviewed and are negative. A complete review of systems was performed and is negative except as noted above in the HPI. PAST MEDICAL HISTORY     Past Medical History:   Diagnosis Date    Ankylosing spondylitis ([facility]     Cancer ([facility]     Colon cancer ([facility]     Diabetes mellitus ([facility]     Gout     History of blood transfusion     Hyperlipidemia     Hypertension          SURGICAL HISTORY       Past Surgical History:   Procedure Laterality Date    CHOLECYSTECTOMY      COLECTOMY           CURRENT MEDICATIONS       Current Discharge Medication List        CONTINUE these medications which have NOT CHANGED    Details   !! capecitabine (XELODA) 500 MG chemo tablet Take 4 tablets by mouth 2 times a day for for 14 days of a 21 day cycle  Qty: 112 tablet, Refills: 5    Associated Diagnoses: Adenocarcinoma of sigmoid colon ([facility]      ! ! capecitabine (XELODA) 500 MG chemo tablet Take 4 tablets (2000 mg) 2 times a day for 14 days of a 21 day cycle  Qty: 112 tablet, Refills: 3      allopurinol (ZYLOPRIM) 300 MG tablet       lisinopril-hydroCHLOROthiazide (PRINZIDE;ZESTORETIC) 20-25 MG per tablet lisinopril 20 mg-hydrochlorothiazide 25 mg tablet      ENBREL SURECLICK 50 MG/ML SOAJ       rosuvastatin (CRESTOR) 10 MG tablet       folic acid (FOLVITE) 1 MG tablet TAKE 1 TABLET BY MOUTH ONCE DAILY      Dapagliflozin-metFORMIN HCl ER (XIGDUO XR)  MG TB24 Xigduo XR 10 mg-1,000 mg tablet,extended release      atenolol (TENORMIN) 25 MG tablet atenolol 25 mg tablet       !! - Potential duplicate medications found. Please discuss with provider. ALLERGIES     Patient has no known allergies.     FAMILY HISTORY       Family History   Problem Relation Age of Onset    Cancer Brother         Colon    Cancer Paternal Uncle         Colon          SOCIAL HISTORY       Social History     Socioeconomic History    Marital status: rebound. Musculoskeletal:         General: No deformity. Normal range of motion. Right lower leg: No edema. Left lower leg: No edema. Skin:     General: Skin is warm and dry. Coloration: Skin is not pale. Findings: No erythema. Neurological:      Mental Status: He is alert and oriented to person, place, and time. GCS: GCS eye subscore is 4. GCS verbal subscore is 5. GCS motor subscore is 6. Cranial Nerves: No cranial nerve deficit. Motor: No abnormal muscle tone.       Coordination: Coordination normal.   Psychiatric:         Behavior: Behavior normal.         Judgment: Judgment normal.       DIAGNOSTIC RESULTS     EKG: All EKG's are interpreted by the Emergency Department Physician who either signs or Co-signs this chart in the absence of a cardiologist.        RADIOLOGY:   Non-plain film images such as CT, Ultrasound and MRI are read by the radiologist. Mario Notice images are visualized and preliminarily interpreted by the emergency physician with the below findings:        Interpretation per the Radiologist below, if available at the time of this note:    VL DUP LOWER EXTREMITY VENOUS BILATERAL   Final Result            ED BEDSIDE ULTRASOUND:   Performed by ED Physician - none    LABS:  Labs Reviewed   CBC WITH AUTO DIFFERENTIAL - Abnormal; Notable for the following components:       Result Value    WBC 10.9 (*)     RBC 4.06 (*)     .4 (*)     MCH 34.5 (*)     RDW 21.7 (*)     Monocytes % 10.8 (*)     Basophils % 1.1 (*)     Monocytes Absolute 1.20 (*)     All other components within normal limits   COMPREHENSIVE METABOLIC PANEL - Abnormal; Notable for the following components:    CO2 31 (*)     Glucose 125 (*)     Calcium 10.1 (*)     All other components within normal limits   PROTIME-INR - Abnormal; Notable for the following components:    Protime 11.9 (*)     All other components within normal limits   APTT - Abnormal; Notable for the following components: aPTT 80.1 (*)     All other components within normal limits    Narrative:     CALL  [place]  [facility] tel. ,  Coag results called to and read back by [facility] RN/PCU, 10/13/2022 20:08, by  Cierra Simon   POCT GLUCOSE - Abnormal; Notable for the following components:    POC Glucose 110 (*)     All other components within normal limits   POCT GLUCOSE - Abnormal; Notable for the following components:    POC Glucose 135 (*)     All other components within normal limits   COVID-19, RAPID   TROPONIN   BRAIN NATRIURETIC PEPTIDE   APTT   CBC WITH AUTO DIFFERENTIAL   BASIC METABOLIC PANEL W/ REFLEX TO MG FOR LOW K   APTT   APTT       All other labs were within normal range or not returned as of this dictation.     Medications   heparin (porcine) injection 8,170 Units (has no administration in time range)   heparin (porcine) injection 4,080 Units (has no administration in time range)   heparin 25,000 units in dextrose 5% 250 mL (premix) infusion (1,700 Units/hr IntraVENous Rate/Dose Change 10/13/22 2011)   sodium chloride flush 0.9 % injection 5-40 mL (5 mLs IntraVENous Not Given 10/13/22 2025)   sodium chloride flush 0.9 % injection 5-40 mL (has no administration in time range)   0.9 % sodium chloride infusion (has no administration in time range)   sodium chloride flush 0.9 % injection 5-40 mL (5 mLs IntraVENous Not Given 10/13/22 2026)   sodium chloride flush 0.9 % injection 5-40 mL (has no administration in time range)   0.9 % sodium chloride infusion (has no administration in time range)   ondansetron (ZOFRAN-ODT) disintegrating tablet 4 mg (has no administration in time range)     Or   ondansetron (ZOFRAN) injection 4 mg (has no administration in time range)   polyethylene glycol (GLYCOLAX) packet 17 g (has no administration in time range)   acetaminophen (TYLENOL) tablet 650 mg (has no administration in time range)     Or   acetaminophen (TYLENOL) suppository 650 mg (has no administration in time range)   famotidine (PEPCID) tablet 20 mg (20 mg Oral Not Given 10/13/22 2022)   heparin (porcine) injection 8,170 Units (8,170 Units IntraVENous Given 10/13/22 1446)       EMERGENCY DEPARTMENT COURSE and DIFFERENTIALDIAGNOSIS/MDM:   Vitals:    Vitals:    10/13/22 1509 10/13/22 1635 10/13/22 1824 10/13/22 1900   BP: 107/78 122/81 97/74 103/77   Pulse: 93 88 98 94   Resp: 18 24 20 18   Temp:  97.4 °F (36.3 °C) 96.8 °F (36 °C) (!) 96.1 °F (35.6 °C)   TempSrc:  Temporal Temporal Temporal   SpO2: 93% 94% 93% 93%   Weight:       Height:           MDM      ED Course as of 10/13/22 2148   Thu Oct 13, 2022   1303 Recent CT does show bilateral sizable pulmonary emboli that are rather proximal in that location. Patient is presently asymptomatic and these were incidentally found. Heart rate in the low 100s, otherwise hemodynamically stable with no respiratory difficulty. [LAXMI]   1304 EKG shows sinus rhythm with a rate of 94. Nonspecific interventricular conduction delay with a QRS borderline at 115. There is S1Q3T3 pattern present, otherwise no signs of acute right heart strain. [LAXMI]   4788 Troponin: <0.01 [LAXMI]   1343 Pro-BNP: 25 [LAXMI]   6882 Work-up largely reassuring here with no signs of acute right heart strain. Patient overall asymptomatic. Initial plan was for Lovenox injection and hopefully discharge with initiation of oral anticoagulant. Discussed case with Dr. Nataly Hou and given the size and severity of the clots recommends admission to family medicine service with vascular surgery consult and initiation of heparin infusion. [LAXMI]      ED Course User Index  [LAXMI] Leann Hawley MD     Based on the evaluation and work-up here patient is felt to require further monitoring, work-up, or treatment that is available in the emergency department. Case was discussed with Dr. Audra Munroe who agrees for observation or admission for further management.   Treatment and stabilization as necessary were provided in the emergency department prior to transfer of care to the family medicine service. CONSULTS:  IP CONSULT TO VASCULAR SURGERY  IP CONSULT TO HEM/ONC  IP CONSULT TO RESPIRATORY CARE    PROCEDURES:  Unless otherwise notedbelow, none     Procedures  CRITICAL CARE TIME   Total Critical Care time was 45 minutes, excluding separately reportable procedures. There was a high probability of clinically significant/life threatening deterioration in the patient's condition which required my urgent intervention. FINAL IMPRESSION     1. Bilateral pulmonary embolism ([address]    2. History of colon cancer          DISPOSITION/PLAN   DISPOSITION Decision To Admit 10/13/2022 01:54:47 PM      No notes of EC Admission Criteria type on file. PATIENT REFERRED TO:  No follow-up provider specified.     DISCHARGE MEDICATIONS:  Current Discharge Medication List             (Please note that portions of this note were completed with a voice recognition program.  Efforts were made to edit the dictations butoccasionally words are mis-transcribed.)    Valdo Perkins MD (electronically signed)  AttendingEmergency Physician         Valdo Perkins., MD  10/13/22 5013

## 2022-10-13 NOTE — CONSULTS
MEDICAL ONCOLOGY CONSULTATION    Pt Name: Davi Alvarado  MRN: [id number]  YOB: 1975  Date of evaluation: 10/13/2022    REASON FOR CONSULTATION: Pulmonary embolism  REQUESTING PHYSICIAN: Hospitalist    History Obtained From:    patient, electronic medical record    HISTORY OF PRESENT ILLNESS:  The patient is a 50years old male who is status post sigmoid colectomy by Dr. Pedro Orta in April 2022. He is currently receiving adjuvant chemotherapy with capecitabine x6 months. The patient underwent a CT chest abdomen pelvis for surveillance 2 days ago. I received a call from radiology today reporting high burden of pulmonary embolism on the CT scan. The patient was contacted and requested to come to the emergency department. He was started on IV heparin. Vascular has been consulted for consideration of chemical thrombolysis treatment for  10/13/22- CTA showed bilateral large pulmonary embolus in both pulmonary arteries. Prior oncological history  The patient presents today for a follow-up visit. 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 He is currently on adjuvant treatment with Xeloda. He is tolerating treatment quite well except for grade 1 diarrhea. Denies any hand-foot syndrome. He was recently diagnosed with COVID. He was prescribed Paxlovid with significant improvement of his symptoms in 2 days. He is now asymptomatic. He is taking Imodium for his diarrhea. He also had a genetic test performed that was negative.      Diagnosis  Adenocarcinoma, sigmoid colon, April 2022  Well to moderately differentiated  pT4a pN0 cM0, stage II high risk  High risk features: pT4, LVI  IHC MMR (MLH1 and PMS2) Intermediate for MMR; No loss of MSH2 and MSH6 by Immunochemistry  Invitae 84 gene panel-VUS MuTHY     Treatment Summary  4/8/22- Sigmoid colectomy with negative lymph node dissection by Dr. Pedro Orta  5/13/22- Initiated adjuvant capecitabine 2 weeks on/1 week off x6-month     Cancer History  Lina Isidro was first seen by me on 5/4/2022. He was referred by Dr. Patel Ortiz. The patient was found to have anemia and further work-up with colonoscopy revealed that he had a large mass in the transverse colon. 3/18/22 Colonoscopy/EGD by Dr. Maricruz Jarrell: COLONOSCOPY: An infiltrative partially obstructing large mass was found in the transverse colon--estimated. The mass was circumferential. I could not get the scope through as past this area. Oozing was present. This was biopsied with a cold forceps for histology. Area was tattooed with an injection of Spot (carbon black). No additional abnormalities were found on retroflexion. EGD: Normal esophagus. Non-erosive gastritis. Biopsied. Normal examined duodenum. Biopsied. 3/18/22 CEA 0.78  3/18/22 Colon mass, site not otherwise specified, biopsy: Invasive moderately differentiated colonic adenocarcinoma with ulceration. Small bowel, biopsy:  Unremarkable small bowel mucosa. 3/29/22  CT abd/pelvis [facility]): Sigmoid colon mass with extraluminal extension is consistent with primary colon malignancy. No CT evidence of distant metastatic disease in the abdomen or pelvis. No evidence of liver masses. 3/29/22 CT chest [facility]): No evidence of metastatic disease in the chest. Bridging anterior osteophytes throughout the thoracic spine, suggestive of ankylosing spondylitis. 4/8/22 Sigmoid colon, sigmoid resection by Dr. Patel Ortiz at [facility]: Well to moderately differentiated colorectal adenocarcinoma (6.3 cm), focally invasive into the visceral peritoneum through area of inflammation. Positive for focal lymphvascular invasion. Negative for perineural invasion. Twenty-two regional lymph nodes, negative for metastatic carcinoma (0/22). Incidental prominent mucosal fold with early superficial hyperplastic changes. Viable opposing surgical resection margins, negative for tumor. pTNM CLASSIFICATION:  pT4a pN0 cM0.  Appendix, appendectomy: Appendix with diffuse intraluminal fibrosis and no other significant pathologic abnormalities. Negative for malignancy. 5/4/2022-he was first seen by me. Essentially, stage II high risk pT4, LVI. IHC MMR was not performed and was ordered today. 5/15/2022-anticipated adjuvant capecitabine x6-month  6/30/2022 CEA 1.2  7/30/2022 Invitae 84 Genetic Panel:VUS. No pathogenic mutation.     Past Medical History:    Past Medical History:   Diagnosis Date    Ankylosing spondylitis ([facility]     Cancer ([facility]     Colon cancer ([facility]     [facility]     Gout     History of blood transfusion     Hyperlipidemia     Hypertension        Past Surgical History:    Past Surgical History:   Procedure Laterality Date    CHOLECYSTECTOMY      COLECTOMY         Social History:    Marital status:   Smoking status:No  ETOH status:No  Resides: [address]    Family History:   Family History   Problem Relation Age of Onset    Cancer Brother         Colon    Cancer Paternal Uncle         Colon       Current Hospital Medications:    Current Facility-Administered Medications   Medication Dose Route Frequency Provider Last Rate Last Admin    heparin (porcine) injection 8,170 Units  80 Units/kg IntraVENous PRN Deb Cardoza MD        heparin (porcine) injection 4,080 Units  40 Units/kg IntraVENous PRN Deb Cardoza MD        heparin 25,000 units in dextrose 5% 250 mL (premix) infusion  5-30 Units/kg/hr IntraVENous Continuous Deb Cardoza MD 18.4 mL/hr at 10/13/22 1608 18 Units/kg/hr at 10/13/22 1608    sodium chloride flush 0.9 % injection 5-40 mL  5-40 mL IntraVENous 2 times per day Hector Walker MD        sodium chloride flush 0.9 % injection 5-40 mL  5-40 mL IntraVENous PRN Hector Walker MD        0.9 % sodium chloride infusion   IntraVENous PRN Hector Walker MD        sodium chloride flush 0.9 % injection 5-40 mL  5-40 mL IntraVENous 2 times per day RIDGE Ribera - CNP        sodium chloride flush 0.9 % injection 5-40 mL  5-40 mL IntraVENous PRN Deborahyn Dove, APRN - CNP        0.9 % sodium chloride infusion   IntraVENous PRN Deborahyn ve, APRN - CNP        ondansetron (ZOFRAN-ODT) disintegrating tablet 4 mg  4 mg Oral Q8H PRN Deborahyn ve, APRN - CNP        Or    ondansetron (ZOFRAN) injection 4 mg  4 mg IntraVENous Q6H PRN Deborahyn ve, APRN - CNP        polyethylene glycol (GLYCOLAX) packet 17 g  17 g Oral Daily PRN Deborahyn ve, APRN - CNP        acetaminophen (TYLENOL) tablet 650 mg  650 mg Oral Q6H PRN Deborahyn Dove, APRN - CNP        Or    acetaminophen (TYLENOL) suppository 650 mg  650 mg Rectal Q6H PRN Deborahyn ve, APRN - CNP        famotidine (PEPCID) tablet 20 mg  20 mg Oral BID Lico Oliveira, APRN - CNP           Allergies: No Known Allergies      Subjective   REVIEW OF SYSTEMS:   CONSTITUTIONAL: no fever, no night sweats,  fatigue;  HEENT: no blurring of vision, no double vision, no hearing difficulty, no tinnitus, no ulceration, no epistaxis;  LUNGS: no cough, no hemoptysis, no wheeze,  no shortness of breath;  CARDIOVASCULAR: no palpitation, no chest pain, no shortness of breath;  GI: no abdominal pain, no nausea, no vomiting, no diarrhea, no constipation;  FAINA: no dysuria, no hematuria, no frequency or urgency, no nephrolithiasis;  MUSCULOSKELETAL: no joint pain, no swelling, no stiffness;  ENDOCRINE: no polyuria, no polydipsia, no cold or heat intolerance;  HEMATOLOGY: no easy bruising or bleeding, no history of clotting disorder;  DERMATOLOGY: no skin rash, no eczema, no pruritus;  PSYCHIATRY: no depression, no anxiety  NEUROLOGY: no syncope, no seizures, no numbness or tingling of hands, no numbness or tingling of feet, no paresis;    Objective   /81   Pulse 88   Temp 97.4 °F (36.3 °C) (Temporal)   Resp 24   Ht 5' 4\" (1.626 m)   Wt 225 lb (102.1 kg)   SpO2 94%   BMI 38.62 kg/m²     PHYSICAL EXAM:  CONSTITUTIONAL: Alert, appropriate, no acute distress  EYES: Non icteric, EOM intact, pupils equal round   ENT: Mucus membranes moist,external inspection of ears and nose are normal  NECK: Supple, no masses. No palpable thyroid mass  CHEST/LUNGS: CTA bilaterally, normal respiratory effort   CARDIOVASCULAR: RRR, no murmurs. No lower extremity edema  ABDOMEN: soft non-tender, active bowel sounds, no HSM. No palpable masses  EXTREMITIES: warm, full ROM in all 4 extremities, no focal weakness. SKIN: warm, dry with no rashes or lesions  LYMPH: No cervical, clavicular, axillary, or inguinal lymphadenopathy  NEUROLOGIC: follows commands, non focal   PSYCH: mood and affect appropriate. Alert and oriented to time, place, person        LABORATORY RESULTS REVIEWED/ANALYZED BY ME:  Recent Labs     10/13/22  1254   WBC 10.9*   HGB 14.0   HCT 42.0   .4*          Lab Results   Component Value Date     10/13/2022    K 3.9 10/13/2022    CL 99 10/13/2022    CO2 31 (H) 10/13/2022    BUN 16 10/13/2022    CREATININE 0.8 10/13/2022    GLUCOSE 125 (H) 10/13/2022    CALCIUM 10.1 (H) 10/13/2022    PROT 8.3 10/13/2022    LABALBU 5.0 10/13/2022    BILITOT 0.6 10/13/2022    ALKPHOS 101 10/13/2022    AST 34 10/13/2022    ALT 33 10/13/2022    LABGLOM >60 10/13/2022    GFRAA >59 10/13/2022    GLOB 3.9 08/31/2022       Lab Results   Component Value Date    INR 0.89 10/13/2022    PROTIME 11.9 (L) 10/13/2022       RADIOLOGY STUDIES REPORT/REVIEWED AND INTERPRETED BY ME:  CT CHEST W CONTRAST    Result Date: 10/13/2022  Bilateral large pulmonary embolus in both pulmonary arteries. This is amenable for thrombolytic therapy which can be performed by me Dr. Luiza Weinberg was notified of this finding Signed by Dr Savanah Davidsonneth Primus Additional Contrast? Oral    Result Date: 10/13/2022  NO ACUTE PATHOLOGY SEEN IN ABDOMEN OR PELVIS Unchanged from prior CT.  Signed by Dr Savanah Peace MD     ASSESSMENT:  #Pulmonary embolism  Risk factors-chemotherapy, recent history of cancer  -Vascular consultation for consideration of intra-arterial thrombolytic therapy  -IV heparin    Stage II, pT4 N0 M0, LVI, IHC MMR pending  Essentially, stage II high risk colon cancer (high risk features: pT4, lymphovascular invasion). IHC MMR is pending. Lengthy discussion about NCCN guidelines, ASCO and risk factors  High risk features: Lymphovascular invasion, pT4 Discussed NCCN guidelines     Patient is currently on adjuvant treatment:  Capecitabine 1000 mg/m2 PO twice daily 2 weeks on/1 week of  8 cycles equal 6-month       PLAN:  IV heparin  US lower extremity  Consult vascular for consideration intra-arterial thrombolytic  Hold chemotherapy    I have seen, examined and reviewed this patient medication list, appropriate labs and imaging studies. I reviewed relevant medical records and others physicians notes. I discussed the plans of care with the patient. I answered all the questions to the patients satisfaction. I have also reviewed the chief complaint (CC) and part of the history (History of Present Illness (HPI), Past Family Social History [facility]), or Review of Systems (ROS) and made changes when appropriated.        (Please note that portions of this note were completed with a voice recognition program. Efforts were made to edit the dictations but occasionally words are mis-transcribed.)      Jimy Berry MD    10/13/22  4:50 PM

## 2022-10-13 NOTE — CARE COORDINATION
10/13/22 1511   Service Assessment   Patient Orientation Alert and Oriented   Cognition Alert   History Provided By Patient   Primary Caregiver Self   Accompanied By/Relationship Spouse   Support Systems Spouse/Significant Other;Family Members   Patient's Healthcare Decision Maker is: Legal Next of Kin   PCP Verified by CM Yes   Last Visit to PCP Within last 3 months   Prior Functional Level Independent in ADLs/IADLs   Current Functional Level Independent in ADLs/IADLs   Can patient return to prior living arrangement Yes   Ability to make needs known: Good   Family able to assist with home care needs: Yes   Would you like for me to discuss the discharge plan with any other family members/significant others, and if so, who? Yes  (spouse)   Financial Resources   (denied financial needs)   [employer]   (denied needs)   CM/SW Referral   (denied needs)   Social/Functional History   Lives With Spouse   Type of [address] [place] [address] Drive Shower/Tub Tub/Shower unit   Bathroom Toilet Standard   Bathroom Equipment   (denied use/need)   [address] Responsibilities Yes   Ambulation Assistance Independent   Transfer Assistance Independent   Active  Yes   Mode of Transportation Car   Occupation Full time employment   Discharge Planning   Type of [address] Prior To Admission C-pap   Potential Assistance Needed   (denied needs)   DME Ordered? No   Potential Assistance Purchasing Medications No   Type of Home Care Services None   Patient expects to be discharged to: [facility]     Patient Contact Information:    [address] [facility]  [phone] (home) [phone] (work)  Telephone Information:   Mobile [phone]     Above information verified?     [x]   Yes  []   No      Emergency Contacts:    Extended Emergency Contact Information  Primary Emergency Contact: Mallory Green Phone: [phone]  Mobile Phone: [phone]  Relation: Spouse  Preferred language: English   needed? No  Secondary Emergency Contact: jose brown  Home Phone: [phone]  Relation: Parent   needed? No      Have you been vaccinated for COVID-19 (SARS-CoV-2)? []   Yes  [x]   No                   If so, when?     Which :         []   Pfizer-BioNTech  []   Moderna  []   Macel Salvia  []   Other:         Pharmacy:    [address] - F [phone]  [address] [place] [address]  Phone: [phone] Fax: [phone]    [address] [place], [address] [place] [address] Road [address] [place] [address] Close [address] [place] [address]  [id number] [place] [address] Drive [address]  Phone: [phone] Fax: [phone]    [facility] [address] [place] [address], [facility] [address] [place] [address] [address] [facility] [phone]  [address]  [address] [facility] [address]  Phone: [phone] Fax: [facility], [address] [place] [address] [phone] [facility] [phone]  [place] [address]  Phone: [phone] Fax: [phone]          Patient Deficits:    []   Yes   [x]   No    If yes:    []   Confusion/Memory  []   Visual  []   Motor/Sensory         []   Right arm         []   Right leg         []   Left arm         []   Left leg  []   Language/Speech         []   Aphasia         []   Dysarthria         []   Swallow         Mariah Coma Scale  Eye Opening: Spontaneous  Best Verbal Response: Oriented  Best Motor Response: Obeys commands  Mariah Coma Scale Score: 15    Patient Deficit Notes:

## 2022-10-13 NOTE — TELEPHONE ENCOUNTER
Just received phone call from Radiology patient currently has Bilateral pulmonary embolus in both pulmonary arteries. Have attempted to call patient and phone numbers on file to get in touch with him,but was unsuccessful.

## 2022-10-14 PROBLEM — K21.9 GASTROESOPHAGEAL REFLUX DISEASE WITHOUT ESOPHAGITIS: Status: ACTIVE | Noted: 2019-03-15

## 2022-10-14 PROBLEM — C18.9 ADENOCARCINOMA, COLON (HCC): Status: ACTIVE | Noted: 2022-03-28

## 2022-10-14 PROBLEM — D50.0 IRON DEFICIENCY ANEMIA DUE TO CHRONIC BLOOD LOSS: Status: ACTIVE | Noted: 2022-03-02

## 2022-10-14 PROBLEM — E78.2 MIXED HYPERLIPIDEMIA: Status: ACTIVE | Noted: 2019-03-15

## 2022-10-14 PROBLEM — E11.65 HYPERGLYCEMIA DUE TO TYPE 2 DIABETES MELLITUS (HCC): Status: ACTIVE | Noted: 2021-02-08

## 2022-10-14 PROBLEM — M10.9 GOUT: Status: ACTIVE | Noted: 2019-03-15

## 2022-10-14 LAB
ANION GAP SERPL CALCULATED.3IONS-SCNC: 8 MMOL/L (ref 7–19)
APTT: 50.1 SEC (ref 26–36.2)
APTT: 52.3 SEC (ref 26–36.2)
APTT: 60.9 SEC (ref 26–36.2)
BASOPHILS ABSOLUTE: 0.1 K/UL (ref 0–0.2)
BASOPHILS RELATIVE PERCENT: 1.2 % (ref 0–1)
BUN BLDV-MCNC: 18 MG/DL (ref 6–20)
CALCIUM SERPL-MCNC: 9.5 MG/DL (ref 8.6–10)
CHLORIDE BLD-SCNC: 97 MMOL/L (ref 98–111)
CO2: 32 MMOL/L (ref 22–29)
CREAT SERPL-MCNC: 1 MG/DL (ref 0.5–1.2)
EKG P AXIS: 33 DEGREES
EKG P-R INTERVAL: 138 MS
EKG Q-T INTERVAL: 342 MS
EKG QRS DURATION: 106 MS
EKG QTC CALCULATION (BAZETT): 399 MS
EKG T AXIS: 13 DEGREES
EOSINOPHILS ABSOLUTE: 0.4 K/UL (ref 0–0.6)
EOSINOPHILS RELATIVE PERCENT: 3.8 % (ref 0–5)
GFR AFRICAN AMERICAN: >59
GFR NON-AFRICAN AMERICAN: >60
GLUCOSE BLD-MCNC: 133 MG/DL (ref 70–99)
GLUCOSE BLD-MCNC: 135 MG/DL (ref 70–99)
GLUCOSE BLD-MCNC: 153 MG/DL (ref 70–99)
GLUCOSE BLD-MCNC: 191 MG/DL (ref 74–109)
HCT VFR BLD CALC: 39.3 % (ref 42–52)
HEMOGLOBIN: 13.1 G/DL (ref 14–18)
IMMATURE GRANULOCYTES #: 0.1 K/UL
LV EF: 60 %
LVEF MODALITY: NORMAL
LYMPHOCYTES ABSOLUTE: 1.7 K/UL (ref 1.1–4.5)
LYMPHOCYTES RELATIVE PERCENT: 16.1 % (ref 20–40)
MCH RBC QN AUTO: 35.1 PG (ref 27–31)
MCHC RBC AUTO-ENTMCNC: 33.3 G/DL (ref 33–37)
MCV RBC AUTO: 105.4 FL (ref 80–94)
MONOCYTES ABSOLUTE: 1.3 K/UL (ref 0–0.9)
MONOCYTES RELATIVE PERCENT: 11.9 % (ref 0–10)
NEUTROPHILS ABSOLUTE: 7.1 K/UL (ref 1.5–7.5)
NEUTROPHILS RELATIVE PERCENT: 65.9 % (ref 50–65)
PDW BLD-RTO: 21.8 % (ref 11.5–14.5)
PERFORMED ON: ABNORMAL
PLATELET # BLD: 234 K/UL (ref 130–400)
PMV BLD AUTO: 10.5 FL (ref 9.4–12.4)
POTASSIUM REFLEX MAGNESIUM: 4.3 MMOL/L (ref 3.5–5)
RBC # BLD: 3.73 M/UL (ref 4.7–6.1)
SODIUM BLD-SCNC: 137 MMOL/L (ref 136–145)
WBC # BLD: 10.7 K/UL (ref 4.8–10.8)

## 2022-10-14 PROCEDURE — 2580000003 HC RX 258: Performed by: NURSE PRACTITIONER

## 2022-10-14 PROCEDURE — 85730 THROMBOPLASTIN TIME PARTIAL: CPT

## 2022-10-14 PROCEDURE — 6360000004 HC RX CONTRAST MEDICATION: Performed by: FAMILY MEDICINE

## 2022-10-14 PROCEDURE — 6360000002 HC RX W HCPCS: Performed by: INTERNAL MEDICINE

## 2022-10-14 PROCEDURE — 6360000002 HC RX W HCPCS: Performed by: EMERGENCY MEDICINE

## 2022-10-14 PROCEDURE — 36415 COLL VENOUS BLD VENIPUNCTURE: CPT

## 2022-10-14 PROCEDURE — 80048 BASIC METABOLIC PNL TOTAL CA: CPT

## 2022-10-14 PROCEDURE — 6370000000 HC RX 637 (ALT 250 FOR IP): Performed by: NURSE PRACTITIONER

## 2022-10-14 PROCEDURE — 6370000000 HC RX 637 (ALT 250 FOR IP): Performed by: FAMILY MEDICINE

## 2022-10-14 PROCEDURE — C8929 TTE W OR WO FOL WCON,DOPPLER: HCPCS

## 2022-10-14 PROCEDURE — 99232 SBSQ HOSP IP/OBS MODERATE 35: CPT | Performed by: INTERNAL MEDICINE

## 2022-10-14 PROCEDURE — 2140000000 HC CCU INTERMEDIATE R&B

## 2022-10-14 PROCEDURE — 85025 COMPLETE CBC W/AUTO DIFF WBC: CPT

## 2022-10-14 PROCEDURE — 82947 ASSAY GLUCOSE BLOOD QUANT: CPT

## 2022-10-14 PROCEDURE — 83516 IMMUNOASSAY NONANTIBODY: CPT

## 2022-10-14 RX ORDER — PREDNISONE 1 MG/1
5 TABLET ORAL DAILY
COMMUNITY

## 2022-10-14 RX ORDER — HYDROCHLOROTHIAZIDE 25 MG/1
25 TABLET ORAL DAILY
Status: DISCONTINUED | OUTPATIENT
Start: 2022-10-14 | End: 2022-10-15 | Stop reason: HOSPADM

## 2022-10-14 RX ORDER — ATENOLOL 50 MG/1
25 TABLET ORAL DAILY
Status: DISCONTINUED | OUTPATIENT
Start: 2022-10-14 | End: 2022-10-15 | Stop reason: HOSPADM

## 2022-10-14 RX ORDER — DEXTROSE MONOHYDRATE 100 MG/ML
INJECTION, SOLUTION INTRAVENOUS CONTINUOUS PRN
Status: DISCONTINUED | OUTPATIENT
Start: 2022-10-14 | End: 2022-10-15 | Stop reason: HOSPADM

## 2022-10-14 RX ORDER — FOLIC ACID 1 MG/1
1 TABLET ORAL DAILY
Status: DISCONTINUED | OUTPATIENT
Start: 2022-10-14 | End: 2022-10-15 | Stop reason: HOSPADM

## 2022-10-14 RX ORDER — LISINOPRIL 20 MG/1
20 TABLET ORAL DAILY
Status: DISCONTINUED | OUTPATIENT
Start: 2022-10-14 | End: 2022-10-15 | Stop reason: HOSPADM

## 2022-10-14 RX ORDER — ROSUVASTATIN CALCIUM 10 MG/1
10 TABLET, COATED ORAL NIGHTLY
Status: DISCONTINUED | OUTPATIENT
Start: 2022-10-14 | End: 2022-10-15 | Stop reason: HOSPADM

## 2022-10-14 RX ORDER — ALLOPURINOL 300 MG/1
300 TABLET ORAL DAILY
Status: DISCONTINUED | OUTPATIENT
Start: 2022-10-14 | End: 2022-10-15 | Stop reason: HOSPADM

## 2022-10-14 RX ORDER — INSULIN LISPRO 100 [IU]/ML
0-4 INJECTION, SOLUTION INTRAVENOUS; SUBCUTANEOUS
Status: DISCONTINUED | OUTPATIENT
Start: 2022-10-14 | End: 2022-10-15 | Stop reason: HOSPADM

## 2022-10-14 RX ORDER — LISINOPRIL AND HYDROCHLOROTHIAZIDE 25; 20 MG/1; MG/1
1 TABLET ORAL DAILY
Status: DISCONTINUED | OUTPATIENT
Start: 2022-10-14 | End: 2022-10-14

## 2022-10-14 RX ORDER — METFORMIN HYDROCHLORIDE 500 MG/1
1000 TABLET, EXTENDED RELEASE ORAL
Status: DISCONTINUED | OUTPATIENT
Start: 2022-10-14 | End: 2022-10-15 | Stop reason: HOSPADM

## 2022-10-14 RX ORDER — INSULIN LISPRO 100 [IU]/ML
0-4 INJECTION, SOLUTION INTRAVENOUS; SUBCUTANEOUS NIGHTLY
Status: DISCONTINUED | OUTPATIENT
Start: 2022-10-14 | End: 2022-10-15 | Stop reason: HOSPADM

## 2022-10-14 RX ORDER — ENOXAPARIN SODIUM 100 MG/ML
1 INJECTION SUBCUTANEOUS 2 TIMES DAILY
Status: DISCONTINUED | OUTPATIENT
Start: 2022-10-14 | End: 2022-10-15 | Stop reason: HOSPADM

## 2022-10-14 RX ADMIN — ENOXAPARIN SODIUM 100 MG: 100 INJECTION SUBCUTANEOUS at 17:51

## 2022-10-14 RX ADMIN — PERFLUTREN 1.5 ML: 6.52 INJECTION, SUSPENSION INTRAVENOUS at 07:52

## 2022-10-14 RX ADMIN — HEPARIN SODIUM 16.7 UNITS/KG/HR: 10000 INJECTION, SOLUTION INTRAVENOUS at 16:36

## 2022-10-14 RX ADMIN — HEPARIN SODIUM 1700 UNITS/HR: 10000 INJECTION, SOLUTION INTRAVENOUS at 05:11

## 2022-10-14 RX ADMIN — ATENOLOL 25 MG: 50 TABLET ORAL at 21:50

## 2022-10-14 RX ADMIN — SODIUM CHLORIDE, PRESERVATIVE FREE 10 ML: 5 INJECTION INTRAVENOUS at 21:38

## 2022-10-14 RX ADMIN — FAMOTIDINE 20 MG: 20 TABLET ORAL at 21:38

## 2022-10-14 RX ADMIN — ROSUVASTATIN CALCIUM 10 MG: 10 TABLET, FILM COATED ORAL at 21:38

## 2022-10-14 NOTE — PROGRESS NOTES
CLINICAL PHARMACY NOTE: MEDS TO BEDS    Total # of Prescriptions Filled: 2   The following medications were delivered to the patient:  Eliquis Starter Pack   Enoxaparin 100mg/ml     Additional Documentation:    Handed scripts to patient at bedside

## 2022-10-14 NOTE — PROGRESS NOTES
MEDICAL ONCOLOGY PROGRESS NOTE     Pt Name: Pilar Pham  MRN: [id number]  YOB: 1975  Date of evaluation: 10/14/2022    Subjective-denies any chest pain or short of breath. HISTORY OF PRESENT ILLNESS:  The patient is a 50years old male who is status post sigmoid colectomy by Dr. Art Evans in April 2022. He is currently receiving adjuvant chemotherapy with capecitabine x6 months. The patient underwent a CT chest abdomen pelvis for surveillance 2 days ago. I received a call from radiology today reporting high burden of pulmonary embolism on the CT scan. The patient was contacted and requested to come to the emergency department. He was started on IV heparin. Vascular has been consulted for consideration of chemical thrombolysis treatment for  10/13/22- CTA showed bilateral large pulmonary embolus in both pulmonary arteries. 10/13/2022-US bilateral lower extremity showed no evidence of acute DVT  10/14/2022-2D echo-pending    Prior oncological history  The patient presents today for a follow-up visit. 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 He is currently on adjuvant treatment with Xeloda. He is tolerating treatment quite well except for grade 1 diarrhea. Denies any hand-foot syndrome. He was recently diagnosed with COVID. He was prescribed Paxlovid with significant improvement of his symptoms in 2 days. He is now asymptomatic. He is taking Imodium for his diarrhea. He also had a genetic test performed that was negative.      Diagnosis  Adenocarcinoma, sigmoid colon, April 2022  Well to moderately differentiated  pT4a pN0 cM0, stage II high risk  High risk features: pT4, LVI  IHC MMR (MLH1 and PMS2) Intermediate for MMR; No loss of MSH2 and MSH6 by Immunochemistry  Invitae 84 gene panel-VUS MuTHY     Treatment Summary  4/8/22- Sigmoid colectomy with negative lymph node dissection by Dr. Art Evans  5/13/22- Initiated adjuvant capecitabine 2 weeks on/1 week off x6-month Cancer History  Griselda Rios was first seen by me on 5/4/2022. He was referred by Dr. Estelita Flynn. The patient was found to have anemia and further work-up with colonoscopy revealed that he had a large mass in the transverse colon. 3/18/22 Colonoscopy/EGD by Dr. Helen Saeed: COLONOSCOPY: An infiltrative partially obstructing large mass was found in the transverse colon--estimated. The mass was circumferential. I could not get the scope through as past this area. Oozing was present. This was biopsied with a cold forceps for histology. Area was tattooed with an injection of Spot (carbon black). No additional abnormalities were found on retroflexion. EGD: Normal esophagus. Non-erosive gastritis. Biopsied. Normal examined duodenum. Biopsied. 3/18/22 CEA 0.78  3/18/22 Colon mass, site not otherwise specified, biopsy: Invasive moderately differentiated colonic adenocarcinoma with ulceration. Small bowel, biopsy:  Unremarkable small bowel mucosa. 3/29/22  CT abd/pelvis [facility]): Sigmoid colon mass with extraluminal extension is consistent with primary colon malignancy. No CT evidence of distant metastatic disease in the abdomen or pelvis. No evidence of liver masses. 3/29/22 CT chest [facility]): No evidence of metastatic disease in the chest. Bridging anterior osteophytes throughout the thoracic spine, suggestive of ankylosing spondylitis. 4/8/22 Sigmoid colon, sigmoid resection by Dr. Estelita Flynn at [facility]: Well to moderately differentiated colorectal adenocarcinoma (6.3 cm), focally invasive into the visceral peritoneum through area of inflammation. Positive for focal lymphvascular invasion. Negative for perineural invasion. Twenty-two regional lymph nodes, negative for metastatic carcinoma (0/22). Incidental prominent mucosal fold with early superficial hyperplastic changes. Viable opposing surgical resection margins, negative for tumor. pTNM CLASSIFICATION:  pT4a pN0 cM0.  Appendix, appendectomy: Appendix with diffuse intraluminal fibrosis and no other significant pathologic abnormalities. Negative for malignancy. 5/4/2022-he was first seen by me. Essentially, stage II high risk pT4, LVI. IHC MMR was not performed and was ordered today. 5/15/2022-anticipated adjuvant capecitabine x6-month  6/30/2022 CEA 1.2  7/30/2022 Invitae 84 Genetic Panel:VUS. No pathogenic mutation.     Past Medical History:    Past Medical History:   Diagnosis Date    Ankylosing spondylitis ([facility]     Cancer ([facility]     Colon cancer ([facility]     [facility]     Gout     History of blood transfusion     Hyperlipidemia     Hypertension        Past Surgical History:    Past Surgical History:   Procedure Laterality Date    CHOLECYSTECTOMY      COLECTOMY         Social History:    Marital status:   Smoking status:No  ETOH status:No  Resides: [place], [place]    Family History:   Family History   Problem Relation Age of Onset    Cancer Brother         Colon    Cancer Paternal Uncle         Colon       Current Hospital Medications:    Current Facility-Administered Medications   Medication Dose Route Frequency Provider Last Rate Last Admin    heparin (porcine) injection 8,170 Units  80 Units/kg IntraVENous PRN Herlinda Duval MD        heparin (porcine) injection 4,080 Units  40 Units/kg IntraVENous PRN Herlinda Duval MD        heparin 25,000 units in dextrose 5% 250 mL (premix) infusion  5-30 Units/kg/hr IntraVENous Continuous Herlinda Duval MD 17 mL/hr at 10/14/22 0511 1,700 Units/hr at 10/14/22 0511    sodium chloride flush 0.9 % injection 5-40 mL  5-40 mL IntraVENous 2 times per day Marlan Councilman, MD        sodium chloride flush 0.9 % injection 5-40 mL  5-40 mL IntraVENous PRN Marlan Councilman, MD        0.9 % sodium chloride infusion   IntraVENous PRN Marlan Councilman, MD        sodium chloride flush 0.9 % injection 5-40 mL  5-40 mL IntraVENous 2 times per day RIDGE Washington - CNP        sodium chloride flush 0.9 % injection 5-40 mL

## 2022-10-14 NOTE — CARE COORDINATION
Received consult for medication cost for Lovenox and Eliquis. Patient cost is $0.00 for both medications.   Scripts have been called in to [facility] to [facility] Electronically signed by Nirali John RN on 10/14/2022 at 11:34 AM

## 2022-10-14 NOTE — H&P
CHIEF COMPLAINT: Pulmonary emboli    History Obtained From:  patient     HISTORY OF PRESENT ILLNESS:      The patient is a 50 y.o. male with significant past medical history of ankylosing spondylitis and adenocarcinoma of the colon as well as type 2 diabetes mellitus and hypertension who presents with bilateral pulmonary emboli found on CT scan done for cancer staging. He was asymptomatic and actually traveling for work. He was in the airport at [facility] when he got the call of the abnormal CT scan. He was instructed to if feeling okay and safe to do so return home immediately and present to the hospital for further evaluation. He is not short of breath and has no chest pain. No pain in his legs. He is currently undergoing cancer treatment. Seen on this admission by oncology as well as vascular surgery.     Past Medical History:   Diagnosis Date    Ankylosing spondylitis ([facility]     Cancer ([facility]     [facility]     Diabetes mellitus ([facility]     Gout     History of blood transfusion     Hyperlipidemia     Hypertension        Past Surgical History:   Procedure Laterality Date    CHOLECYSTECTOMY      COLECTOMY         Medications Prior to Admission:    Medications Prior to Admission: capecitabine (XELODA) 500 MG chemo tablet, Take 4 tablets by mouth 2 times a day for for 14 days of a 21 day cycle  [DISCONTINUED] capecitabine (XELODA) 500 MG chemo tablet, Take 4 tablets (2000 mg) 2 times a day for 14 days of a 21 day cycle  allopurinol (ZYLOPRIM) 300 MG tablet,   lisinopril-hydroCHLOROthiazide (PRINZIDE;ZESTORETIC) 20-25 MG per tablet, lisinopril 20 mg-hydrochlorothiazide 25 mg tablet  ENBREL SURECLICK 50 MG/ML SOAJ,   rosuvastatin (CRESTOR) 10 MG tablet,   folic acid (FOLVITE) 1 MG tablet, TAKE 1 TABLET BY MOUTH ONCE DAILY  Dapagliflozin-metFORMIN HCl ER (XIGDUO XR)  MG TB24, Xigduo XR 10 mg-1,000 mg tablet,extended release  atenolol (TENORMIN) 25 MG tablet, atenolol 25 mg tablet    Allergies:    Patient has no known allergies. Social History:    reports that he has never smoked. He has never used smokeless tobacco. He reports that he does not currently use alcohol. He reports that he does not use drugs. Family History:   family history includes Cancer in his brother and paternal uncle. REVIEW OF SYSTEMS:  As above in the HPI, otherwise negative    PHYSICAL EXAM:    Vitals:  /80   Pulse 77   Temp 97.2 °F (36.2 °C) (Temporal)   Resp 18   Ht 5' 4\" (1.626 m)   Wt 225 lb (102.1 kg)   SpO2 94%   BMI 38.62 kg/m²     General Appearance:  awake, alert, oriented, in no acute distress and obese  Skin:  Skin color, texture, turgor normal. No rashes or lesions. Head/face:  NCAT  Eyes:  No gross abnormalities. Neck:  neck- supple, no mass, non-tender  Lungs:  Normal expansion. Clear to auscultation. No rales, rhonchi, or wheezing. Heart:  Heart sounds are normal.  Regular rate and rhythm without murmur, gallop or rub. Abdomen:  Soft, non-tender, normal bowel sounds. No bruits, organomegaly or masses. Extremities: Extremities warm to touch, pink, with no edema. Musculoskeletal:  negative  Neurologic:  negative    DATA:  Radiology Review: CT scan of the chest showing bilateral pulmonary emboli    ASSESSMENT:      Patient Active Problem List   Diagnosis    Bilateral pulmonary embolism (HCC)    Ankylosing spondylitis (HCC)    Adenocarcinoma, colon (HCC)    Abnormal results of liver function studies    Benign essential hypertension    Gastroesophageal reflux disease without esophagitis    Gout    Hyperglycemia due to type 2 diabetes mellitus (HCC)    Iron deficiency anemia due to chronic blood loss    Mixed hyperlipidemia       PLAN:    After evaluation from vascular surgery no plan for thrombolytic therapy. Echocardiogram performed and awaiting results.   As per hematology/oncology if that appears normal we will plan to transition to low molecular weight heparin with eventual plan to transition to oral anticoagulation. Remains stable likely discharge home as early as tomorrow.     Electronically signed by Jason Graff MD on 10/14/2022 at 8:54 AM

## 2022-10-14 NOTE — CONSULTS
Vascular Surgery Consultation    I was consulted to see the patient for bilateral pulmonary embolism. HPI    This is a 42-year-old  man with history of colon cancer status post sigmoid colectomy April 2022 by Dr. Chloe Richards. He did well after that surgery without any complication. He is undergoing chemotherapy and has 2 treatments to go. He has been feeling well without any shortness of breath nor chest pain. However, a screening CT of the chest, abdomen, and pelvis revealed bilateral pulmonary embolus. He does not have symtoms of pulmonary embolism. In fact, the patient states that he was recently in the airport in [facility] and had to run for a plane and had no shortness of breath nor chest pain. The patient does not have a known history of pulmonary embolism. He also does not have any symptoms of deep vein thrombosis (pain, swelling, heaviness in the legs or arms), nor any history of deep vein thrombosis. He does have some right hip pain which she says is from an old [employer] injury that has caught up with him (osteoarthritis). He has been told he needs a hip replacement. His current treatment includes heparin. He does not have an IVC filter. He does not have a personal history of hypercoagulability. He does not have a family history of hypercoagulability. Risk factors for hypercoagulable state include: cancer and sigmoid colectomy in April 2022 .     Current Medical History    Jonathan Man is a 50 y.o. male with the following history reviewed and [facility]:  Patient Active Problem List    Diagnosis Date Noted    Bilateral pulmonary embolism ([facility] [address] 10/13/2022     Priority: Medium     Current Facility-Administered Medications   Medication Dose Route Frequency Provider Last Rate Last Admin    heparin (porcine) injection 8,170 Units  80 Units/kg IntraVENous PRN Rosy Echeverria MD        heparin (porcine) injection 4,080 Units  40 Units/kg IntraVENous PRN Rk Peters Shagufta Bardales MD        heparin 25,000 units in dextrose 5% 250 mL (premix) infusion  5-30 Units/kg/hr IntraVENous Continuous Wilver Lazar MD 17 mL/hr at 10/14/22 0511 1,700 Units/hr at 10/14/22 0511    sodium chloride flush 0.9 % injection 5-40 mL  5-40 mL IntraVENous 2 times per day Christianne Balbuena MD        sodium chloride flush 0.9 % injection 5-40 mL  5-40 mL IntraVENous PRN Christianne Balbunea MD        0.9 % sodium chloride infusion   IntraVENous PRN Christianne Balbuena MD        sodium chloride flush 0.9 % injection 5-40 mL  5-40 mL IntraVENous 2 times per day Raeann Jan, APRN - CNP        sodium chloride flush 0.9 % injection 5-40 mL  5-40 mL IntraVENous PRN Raeann Jan, APRN - CNP        0.9 % sodium chloride infusion   IntraVENous PRN Raeann Jan, APRN - CNP        ondansetron (ZOFRAN-ODT) disintegrating tablet 4 mg  4 mg Oral Q8H PRN Raeann Jan, APRN - CNP        Or    ondansetron (ZOFRAN) injection 4 mg  4 mg IntraVENous Q6H PRN Raeann Jan, APRN - CNP        polyethylene glycol (GLYCOLAX) packet 17 g  17 g Oral Daily PRN Raeann Jan, APRN - CNP        acetaminophen (TYLENOL) tablet 650 mg  650 mg Oral Q6H PRN Raeann Jan, APRN - CNP        Or    acetaminophen (TYLENOL) suppository 650 mg  650 mg Rectal Q6H PRN Raeann Jan, APRN - CNP        famotidine (PEPCID) tablet 20 mg  20 mg Oral BID Raeann Jan, APRN - CNP         Allergies: Patient has no known allergies.   Past Medical History:   Diagnosis Date    Ankylosing spondylitis ([facility] [address]     Cancer ([facility] [address]     Colon cancer ([facility] [address] [facility]     Diabetes mellitus ([facility] [address]     Gout     History of blood transfusion     Hyperlipidemia     Hypertension      Past Surgical History:   Procedure Laterality Date    CHOLECYSTECTOMY      COLECTOMY       Family History   Problem Relation Age of Onset    Cancer Brother         Colon    Cancer Paternal Uncle         Colon     Social History     Tobacco Use    Smoking status: Never    Smokeless tobacco: Never   Substance Use Topics    Alcohol use: Not Currently       Review of Systems    See HPI and H and P  All other review of systems are negative. Physical Exam    Constitutional - well developed, well nourished. No diaphoresis or acute distress. Cardiovascular - Regular rate and rhythm. Heart sounds are normal.  No murmur, rub, or gallop. Carotid pulses are 2+ to palpation bilaterally without bruit. Extremities - No cyanosis, clubbing, or edema. No signs atheroembolic event. Pulmonary - no labored breathing. no respiratory distress. GI - Abdomen - soft, non tender, bowel sounds X 4 quadrants. No guarding or rebound tenderness. No distension or palpable mass. Neurologic - alert and oriented X 3. Physiologic. Psychiatric - mood, affect, and behavior appear normal.  Judgment and thought processes appear normal.    Radiology/Vascular lab tests:    Vascular Lower Extremities DVT Study Procedure        Demographics        Patient Name     Cheryl Thomas Age                    50        Patient Number   [id number]         Gender                 Male        Visit Number     [id number]      Interpreting Physician Akin Morrissey MD        Date of Birth    1975     Referring Physician    Pritesh Craig        Accession Number [id number]     [id number] [facility]       Procedure   Type of Study:        Veins:Lower Extremities DVT Study, VL LOWER EXTREMITY BILATERAL VENOUS    DUPLEX . Indications for Study:Pulmonary embolism. Impression        Normal venous duplex study of the bilateral lower extremities. There is no    evidence of deep or superficial venous thrombosis. Patient MRN: [id number]   : 1975   Age:  50 years   Gender: Male   Order Date: 10/11/2022 8:22 AM   Exam: CT ABDOMEN PELVIS W IV CONTRAST   Number of Images: 696 views   Indication:  C18.7 adenocarcinoma of the sigmoid colon   Comparison: Prior CT from an outside center from 2022 is   available. .   Technique:  The sequential axial CT of the scan of the abdomen and   pelvis was obtained from base of the diaphragm to the pubic symphysis   with multiplanar reformat. The study was obtained with intravenous   contrast. Study was also obtained with oral contrast.   Radiation Output: CTDIvol 21.29/18.28 (mGy); DLP 1606.41 (mGy-cm)    Finding: The lung bases are clear. The heart and pericardium appear   normal. There is coronary calcification suggesting of coronary artery   disease   The liver demonstrates no evidence of parenchymal lesions or   intrahepatic biliary dilatation. The gallbladder is surgically absent   with clips in the gallbladder fossa. The spleen, pancreas and adrenal glands do not show any appreciable   abnormality. Both kidneys have normal nephrogram with normal excretion of contrast.   There is no stone, mass or hydronephrosis. No evidence of intestinal obstruction is seen. The bowels are   well-opacified with oral contrast. There is fecal stasis seen in the   rectal vault. The appendix is not visualized; however, no evidence of   appendicitis is seen. No retroperitoneal or mesenteric lymphadenopathy   is detected. The abdominal aorta and iliac arteries demonstrate diffuse   atherosclerotic changes with no evidence of aneurysmal dilatation. The   osseous structures of the abdomen and pelvis appear to be normal.   There is osteoarthritic changes of the lumbar spine. The contrast   study demonstrates no enhancing lesion identified. The urinary bladder appears to be normal. There is no mass or debris   seen. There is no mass or free fluid seen in pelvis. Prominent prostate       Patient MRN: [id number]   : 1975   Age:  50 years   Gender: Male   Order Date: 10/11/2022 8:19 AM   Exam: CT CHEST W CONTRAST   Number of Images: 199 views   Indication:  C18.7    Comparison: Prior study from 2022 is available.    Technique: Contiguous transaxial cuts were obtained from the thoracic   inlet to the base of the diaphragm with IV contrast enhancement. Multiplanar reformats were obtained. Radiation Output: CTDIvol 21.29/18.28 (mGy); -6906 (mGy-cm)    Findings: The study demonstrates no evidence of parenchymal mass or   infiltrate. There is no pleural effusion. The cardiovascular   demonstrate heart and pericardium appears to be normal. There is large   embolus seen in both main pulmonary arteries. . No mediastinal or hilar   lymphadenopathy is seen. The chest wall demonstrate severe kyphosis of   thoracic spine with calcification of the anterior longitudinal   ligament. . Axillary lymphadenopathy is not identified. The visualized   portion of the upper abdomen demonstrates no abnormality. Impression   Bilateral large pulmonary embolus in both pulmonary arteries. Assessment    1. Bilateral pulmonary embolism ([facility] [address]    2. History of colon cancer        [facility] basically has asymptomatic bilateral fairly large pulmonary emboli. There is no lower extremity deep vein thrombosis. He is in no respiratory distress. In fact, he states that he recently had to run in the airport in [facility] to catch a plane and he had no shortness of breath. He denies any chest pain. His risk factors for pulmonary embolism are his surgery in April 2022, sigmoid colectomy, for colon cancer. Plan    I agree with full anticoagulation until we are sure he is cancer free. Madelyn can decide on a DOAC versus Lovenox. No indication for tPA thrombolytics or suction thrombectomy at this time. I discussed all of the above with the patient. He seems to understand and agrees.

## 2022-10-15 VITALS
SYSTOLIC BLOOD PRESSURE: 96 MMHG | OXYGEN SATURATION: 96 % | BODY MASS INDEX: 38.41 KG/M2 | RESPIRATION RATE: 16 BRPM | DIASTOLIC BLOOD PRESSURE: 58 MMHG | HEART RATE: 68 BPM | HEIGHT: 64 IN | WEIGHT: 225 LBS | TEMPERATURE: 98.6 F

## 2022-10-15 LAB
GLUCOSE BLD-MCNC: 141 MG/DL (ref 70–99)
GLUCOSE BLD-MCNC: 159 MG/DL (ref 70–99)
PERFORMED ON: ABNORMAL
PERFORMED ON: ABNORMAL

## 2022-10-15 PROCEDURE — 2580000003 HC RX 258: Performed by: NURSE PRACTITIONER

## 2022-10-15 PROCEDURE — 6370000000 HC RX 637 (ALT 250 FOR IP): Performed by: FAMILY MEDICINE

## 2022-10-15 PROCEDURE — 6370000000 HC RX 637 (ALT 250 FOR IP): Performed by: NURSE PRACTITIONER

## 2022-10-15 PROCEDURE — 6360000002 HC RX W HCPCS: Performed by: INTERNAL MEDICINE

## 2022-10-15 PROCEDURE — 99232 SBSQ HOSP IP/OBS MODERATE 35: CPT | Performed by: INTERNAL MEDICINE

## 2022-10-15 PROCEDURE — 82947 ASSAY GLUCOSE BLOOD QUANT: CPT

## 2022-10-15 RX ADMIN — SODIUM CHLORIDE, PRESERVATIVE FREE 10 ML: 5 INJECTION INTRAVENOUS at 08:58

## 2022-10-15 RX ADMIN — LISINOPRIL 20 MG: 20 TABLET ORAL at 08:58

## 2022-10-15 RX ADMIN — FAMOTIDINE 20 MG: 20 TABLET ORAL at 08:58

## 2022-10-15 RX ADMIN — HYDROCHLOROTHIAZIDE 25 MG: 25 TABLET ORAL at 08:58

## 2022-10-15 RX ADMIN — ALLOPURINOL 300 MG: 300 TABLET ORAL at 08:58

## 2022-10-15 RX ADMIN — METFORMIN HYDROCHLORIDE 1000 MG: 500 TABLET, EXTENDED RELEASE ORAL at 08:58

## 2022-10-15 RX ADMIN — FOLIC ACID 1 MG: 1 TABLET ORAL at 08:58

## 2022-10-15 RX ADMIN — ENOXAPARIN SODIUM 100 MG: 100 INJECTION SUBCUTANEOUS at 08:58

## 2022-10-15 NOTE — PROGRESS NOTES
Patient has been given Lovenox 100mg and Eliquis starter pack for discharge and has been instructed on how to self administer injections.  Dr. Keyon Benítez office will call patient for follow-up appointment,  Electronically signed by Mariano Terry RN on 10/15/2022 at 2:29 PM

## 2022-10-15 NOTE — PROGRESS NOTES
MEDICAL ONCOLOGY PROGRESS NOTE     Pt Name: Joy Modi  MRN: [id number]  YOB: 1975  Date of evaluation: 10/15/2022    Subjective-denies any shortness of breath this morning. Denies any complaints. He walked around the nurses station twice today without any problems. He has received his Lovenox and Eliquis supplies. He denies any bleeding. He had a Lovenox shot yesterday and also this morning. He is anxious and awaiting discharge today. HISTORY OF PRESENT ILLNESS:  The patient is a 50years old male who is status post sigmoid colectomy by Dr. Elsa Gregory in April 2022. He is currently receiving adjuvant chemotherapy with capecitabine x6 months. The patient underwent a CT chest abdomen pelvis for surveillance 2 days ago. I received a call from radiology today reporting high burden of pulmonary embolism on the CT scan. The patient was contacted and requested to come to the emergency department. He was started on IV heparin. Vascular has been consulted for consideration of chemical thrombolysis treatment for  10/13/22- CTA showed bilateral large pulmonary embolus in both pulmonary arteries. 10/13/2022-US bilateral lower extremity showed no evidence of acute DVT  10/14/2022-2D echo-pending    Prior oncological history  The patient presents today for a follow-up visit. 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 He is currently on adjuvant treatment with Xeloda. He is tolerating treatment quite well except for grade 1 diarrhea. Denies any hand-foot syndrome. He was recently diagnosed with COVID. He was prescribed Paxlovid with significant improvement of his symptoms in 2 days. He is now asymptomatic. He is taking Imodium for his diarrhea. He also had a genetic test performed that was negative.      Diagnosis  Adenocarcinoma, sigmoid colon, April 2022  Well to moderately differentiated  pT4a pN0 cM0, stage II high risk  High risk features: pT4, LVI  IHC MMR (MLH1 and PMS2) Intermediate for MMR; No loss of MSH2 and MSH6 by Immunochemistry  [employer] gene panel-VUS MuTHY  Provoked pulmonary embolism, October 2022 (risk factors: Prolonged travel, chemotherapy, surgery 6 months prior?)     Treatment Summary  4/8/22- Sigmoid colectomy with negative lymph node dissection by Dr. Patel Ortiz  5/13/22- Initiated adjuvant capecitabine 2 weeks on/1 week off x6-month     Cancer History  Lina Isidro was first seen by me on 5/4/2022. He was referred by Dr. Patel Ortiz. The patient was found to have anemia and further work-up with colonoscopy revealed that he had a large mass in the transverse colon. 3/18/22 Colonoscopy/EGD by Dr. Maricruz Jarrell: COLONOSCOPY: An infiltrative partially obstructing large mass was found in the transverse colon--estimated. The mass was circumferential. I could not get the scope through as past this area. Oozing was present. This was biopsied with a cold forceps for histology. Area was tattooed with an injection of Spot (carbon black). No additional abnormalities were found on retroflexion. EGD: Normal esophagus. Non-erosive gastritis. Biopsied. Normal examined duodenum. Biopsied. 3/18/22 CEA 0.78  3/18/22 Colon mass, site not otherwise specified, biopsy: Invasive moderately differentiated colonic adenocarcinoma with ulceration. Small bowel, biopsy:  Unremarkable small bowel mucosa. 3/29/22  CT abd/pelvis [facility]): Sigmoid colon mass with extraluminal extension is consistent with primary colon malignancy. No CT evidence of distant metastatic disease in the abdomen or pelvis. No evidence of liver masses. 3/29/22 CT chest [facility]): No evidence of metastatic disease in the chest. Bridging anterior osteophytes throughout the thoracic spine, suggestive of ankylosing spondylitis.    4/8/22 Sigmoid colon, sigmoid resection by Dr. Patel Ortiz at [facility]: Well to moderately differentiated colorectal adenocarcinoma (6.3 cm), focally invasive into the visceral peritoneum through area of inflammation. Positive for focal lymphvascular invasion. Negative for perineural invasion. Twenty-two regional lymph nodes, negative for metastatic carcinoma (0/22). Incidental prominent mucosal fold with early superficial hyperplastic changes. Viable opposing surgical resection margins, negative for tumor. pTNM CLASSIFICATION:  pT4a pN0 cM0. Appendix, appendectomy: Appendix with diffuse intraluminal fibrosis and no other significant pathologic abnormalities. Negative for malignancy. 5/4/2022-he was first seen by me. Essentially, stage II high risk pT4, LVI. IHC MMR was not performed and was ordered today. 5/15/2022-anticipated adjuvant capecitabine x6-month  6/30/2022 CEA 1.2  7/30/2022 [employer] Genetic Panel:VUS. No pathogenic mutation. 10/13/22- CT chest showed bilateral large pulmonary embolus in both pulmonary arteries. 10/13/2022-CT abdomen pelvis showed no evidence of intra-abdominal metastatic disease  10/13/2022-US bilateral lower extremity showed no evidence of acute DVT  10/14/2022-hospitalized at [facility] with pulmonary embolism. Incidental finding on surveillance CT chest.  He was started on IV heparin. Vascular consulted but recommended no thrombolytic. To be echo showed normal EF. No RV dilatation. Patient was discharged on Lovenox with transition to Eliquis after 3 weeks. Thrombophilia work-up will be performed in clinic.     Past Medical History:    Past Medical History:   Diagnosis Date    Ankylosing spondylitis ([address]     Cancer ([address]     Colon cancer ([address]     Diabetes mellitus ([address]     Gout     History of blood transfusion     Hyperlipidemia     Hypertension        Past Surgical History:    Past Surgical History:   Procedure Laterality Date    CHOLECYSTECTOMY      COLECTOMY         Social History:    Marital status:   Smoking status:No  ETOH status:No  Resides: [place], [place]    Family History:   Family History   Problem Relation Age of Onset    Cancer Brother Colon    Cancer Paternal Uncle         Colon       Current Hospital Medications:    Current Facility-Administered Medications   Medication Dose Route Frequency Provider Last Rate Last Admin    perflutren lipid microspheres (DEFINITY) injection 1.65 mg  1.5 mL IntraVENous ONCE PRN Tellis Cushing, MD   1.5 mL at 10/14/22 0752    allopurinol (ZYLOPRIM) tablet 300 mg  300 mg Oral Daily Tellis Cushing, MD        atenolol (TENORMIN) tablet 25 mg  25 mg Oral Daily Tellis Cushing, MD   25 mg at 34/96/25 0734    folic acid (FOLVITE) tablet 1 mg  1 mg Oral Daily Tellis Cushing, MD        rosuvastatin (CRESTOR) tablet 10 mg  10 mg Oral Nightly Tellis Cushing, MD   10 mg at 10/14/22 2138    glucose chewable tablet 16 g  4 tablet Oral PRN Tellis Cushing, MD        dextrose bolus 10% 125 mL  125 mL IntraVENous PRN Tellis Cushing, MD        Or    dextrose bolus 10% 250 mL  250 mL IntraVENous PRN Tellis Cushing, MD        glucagon (rDNA) injection 1 mg  1 mg SubCUTAneous PRN Tellis Cushing, MD        dextrose 10 % infusion   IntraVENous Continuous PRN Tellis Cushing, MD        insulin lispro (HUMALOG) injection vial 0-4 Units  0-4 Units SubCUTAneous TID WC Tellis Cushing, MD        insulin lispro (HUMALOG) injection vial 0-4 Units  0-4 Units SubCUTAneous Nightly Tellis Cushing, MD        metFORMIN [facility]) extended release tablet 1,000 mg  1,000 mg Oral Daily with breakfast Tellis Cushing, MD        lisinopril (PRINIVIL;ZESTRIL) tablet 20 mg  20 mg Oral Daily Tellis Cushing, MD        And    hydroCHLOROthiazide (HYDRODIURIL) tablet 25 mg  25 mg Oral Daily Tellis Cushing, MD        enoxaparin (LOVENOX) injection 100 mg  1 mg/kg SubCUTAneous BID Sho Sanders MD   100 mg at 10/14/22 1751    sodium chloride flush 0.9 % injection 5-40 mL  5-40 mL IntraVENous 2 times per day Tellis Cushing, MD        sodium chloride flush 0.9 % injection 5-40 mL  5-40 mL IntraVENous PRN Tellis Cushing, MD        0.9 % sodium chloride infusion IntraVENous PRN Angelica Manuel MD        sodium chloride flush 0.9 % injection 5-40 mL  5-40 mL IntraVENous 2 times per day Abida Heaps, APRN - CNP   10 mL at 10/14/22 2138    sodium chloride flush 0.9 % injection 5-40 mL  5-40 mL IntraVENous PRN Abida Heaps, APRN - CNP        0.9 % sodium chloride infusion   IntraVENous PRN Abida Heaps, APRN - CNP        ondansetron (ZOFRAN-ODT) disintegrating tablet 4 mg  4 mg Oral Q8H PRN Abida Heaps, APRN - CNP        Or    ondansetron (ZOFRAN) injection 4 mg  4 mg IntraVENous Q6H PRN Abida Heaps, APRN - CNP        polyethylene glycol (GLYCOLAX) packet 17 g  17 g Oral Daily PRN Abida Heaps, APRN - CNP        acetaminophen (TYLENOL) tablet 650 mg  650 mg Oral Q6H PRN Abida Heaps, APRN - CNP        Or    acetaminophen (TYLENOL) suppository 650 mg  650 mg Rectal Q6H PRN Abida Heaps, APRN - CNP        famotidine (PEPCID) tablet 20 mg  20 mg Oral BID Abida Heaps, APRN - CNP   20 mg at 10/14/22 2138       Allergies: No Known Allergies        Objective   BP (!) 96/58   Pulse 66   Temp 98.6 °F (37 °C)   Resp 16   Ht 5' 4\" (1.626 m)   Wt 225 lb (102.1 kg)   SpO2 96%   BMI 38.62 kg/m²     PHYSICAL EXAM:  CONSTITUTIONAL: Alert, appropriate, no acute distress  EYES: Non icteric, EOM intact, pupils equal round   ENT: Mucus membranes moist,external inspection of ears and nose are normal  NECK: Supple, no masses. No palpable thyroid mass  CHEST/LUNGS: CTA bilaterally, normal respiratory effort   CARDIOVASCULAR: RRR, no murmurs. No lower extremity edema  ABDOMEN: soft non-tender, active bowel sounds, no HSM. No palpable masses  EXTREMITIES: warm, full ROM in all 4 extremities, no focal weakness. SKIN: warm, dry with no rashes or lesions  LYMPH: No cervical, clavicular, axillary, or inguinal lymphadenopathy  NEUROLOGIC: follows commands, non focal   PSYCH: mood and affect appropriate.  Alert and oriented to time, place, person        LABORATORY RESULTS REVIEWED/ANALYZED BY ME:  Recent Labs 10/14/22  0149 10/13/22  1254   WBC 10.7 10.9*   HGB 13.1* 14.0   HCT 39.3* 42.0   .4* 103.4*    275       Lab Results   Component Value Date     10/14/2022    K 4.3 10/14/2022    CL 97 (L) 10/14/2022    CO2 32 (H) 10/14/2022    BUN 18 10/14/2022    CREATININE 1.0 10/14/2022    GLUCOSE 191 (H) 10/14/2022    CALCIUM 9.5 10/14/2022    PROT 8.3 10/13/2022    LABALBU 5.0 10/13/2022    BILITOT 0.6 10/13/2022    ALKPHOS 101 10/13/2022    AST 34 10/13/2022    ALT 33 10/13/2022    LABGLOM >60 10/14/2022    GFRAA >59 10/14/2022    GLOB 3.9 08/31/2022       Lab Results   Component Value Date    INR 0.89 10/13/2022    PROTIME 11.9 (L) 10/13/2022       RADIOLOGY STUDIES REPORT/REVIEWED AND INTERPRETED BY ME:  CT CHEST W CONTRAST    Result Date: 10/13/2022  Bilateral large pulmonary embolus in both pulmonary arteries. This is amenable for thrombolytic therapy which can be performed by me Dr. Phillip Conti was notified of this finding Signed by Dr Nick Jain Additional Contrast? Oral    Result Date: 10/13/2022  NO ACUTE PATHOLOGY SEEN IN ABDOMEN OR PELVIS Unchanged from prior CT. Signed by Dr Nick Whitley MD     10/14/2022-2D echo   Summary   Left ventricle had normal chamber size and thickness   Preserved systolic function with estimated ejection fraction of 60%   No regional wall motion abnormality   Normal diastolic function   No significant valvular lesion seen    ASSESSMENT:  #Pulmonary embolism  Risk factors-chemotherapy, recent history of cancer  -Vascular consultation for consideration of intra-arterial thrombolytic therapy  -IV heparin  -Bilateral lower extremity ultrasound-no evidence of acute DVT  -10/14/2022-2D echo showed no evidence of RV dilatation. Normal EF.   Otherwise unremarkable  10/15/2022-Discharge home on Lovenox 1 mg/kg every 12 hours x3 weeks to be transition to Eliquis 10 mg p.o. twice daily x7 days to be followed by 5 mg p.o. twice daily    Stage II, pT4 N0 M0, LVI, IHC MMR pending  Essentially, stage II high risk colon cancer (high risk features: pT4, lymphovascular invasion). IHC MMR is pending. Lengthy discussion about NCCN guidelines, ASCO and risk factors  High risk features: Lymphovascular invasion, pT4 Discussed NCCN guidelines     Patient is currently on adjuvant treatment:  Capecitabine 1000 mg/m2 PO twice daily 2 weeks on/1 week of  8 cycles equal 6-month  -He will resume treatment next week    Macrocytic anemia-secondary to chemotherapy  Recent Labs     10/14/22  0149 10/13/22  1254   WBC 10.7 10.9*   HGB 13.1* 14.0   HCT 39.3* 42.0   .4* 103.4*    275   Continue to monitor    Disposition-okay to discharge from oncology standpoint today on 10/15/2022.     PLAN:  Discharged home on Lovenox 1 mg/kg every 12 hours x3 weeks to be transition to Eliquis 10 mg p.o. twice daily x7 days to be followed by 5 mg p.o. twice daily  Resume chemotherapy next week  Follow-up antiphospholipid panel  Outpatient thrombophilia work-up in clinic in 3 weeks  Will arrange follow-up in clinic with me in about 3 weeks  Colonoscopy in December with Dr. Karmen Klein as per patient    (Please note that portions of this note were completed with a voice recognition program. Efforts were made to edit the dictations but occasionally words are mis-transcribed.)      Mercedes Cross MD    10/15/22  7:11 AM

## 2022-10-15 NOTE — DISCHARGE SUMMARY
Discharge Summary    Patient ID: Jerilyn Valadez  MRN: [id number]     Acct:  [de-identified]       Patient's PCP: Christianne Balbuena MD    Admit Date: 10/13/2022     Discharge Date:   10/15/22    Admitting Physician: Christianne Balbuena MD    Discharge Physician: Gabi Allen MD     Active Discharge Diagnoses:    Primary Problem  Bilateral pulmonary embolism [facility])  [facility]    Diagnosis Date Noted    Bilateral pulmonary embolism ([facility] [I26.99] 10/13/2022     Priority: Medium    Adenocarcinoma, colon ([facility] [C18.9] 03/28/2022     Priority: Medium    Iron deficiency anemia due to chronic blood loss [D50.0] 03/02/2022     Priority: Medium    Hyperglycemia due to type 2 diabetes mellitus ([facility] [E11.65] 02/08/2021     Priority: Medium    Gastroesophageal reflux disease without esophagitis [K21.9] 03/15/2019     Priority: Medium    Gout [M10.9] 03/15/2019     Priority: Medium    Abnormal results of liver function studies [R94.5] 11/06/2016     Priority: Medium    Benign essential hypertension [I10] 11/08/2015     Priority: Medium    Ankylosing spondylitis ([facility] [M45.9] 04/13/2012     Priority: Medium       The patient was seen and examined on day of discharge and this discharge summary is in conjunction with the daily progress note from day of discharge. Code Status:  Full Code    Hospital Course: The patient is a 50 y.o. male with significant past medical history of ankylosing spondylitis and adenocarcinoma of the colon as well as type 2 diabetes mellitus and hypertension who presented with bilateral pulmonary emboli found on CT scan done for cancer staging. He was asymptomatic and actually traveling for work. He was in the airport at [facility] when he got the call of the abnormal CT scan. He was instructed to, if feeling okay and safe to do so, return home immediately and presented to the hospital for further evaluation. He is not short of breath and has no chest pain.   No pain in his legs.  He is currently undergoing cancer treatment. Seen on this admission by oncology as well as vascular surgery. He will be discharged with lovenox and eliquis per hematology. The patient was admitted for the above noted medical/surgical issues. Please see daily progress note for further details concerning their stay. The patient improved throughout their stay and reached maximum medical improvement on the day of discharge. The patient/family agree with the treatment plan as outlined above. All questions concerning their stay were answered prior to discharge. They understand the importance of follow up concerning any abnormal test results. Physical Exam  Vitals reviewed. Constitutional:       Appearance: Normal appearance. He is not ill-appearing. HENT:      Head: Normocephalic and atraumatic. Eyes:      General:         Right eye: No discharge. Left eye: No discharge. Extraocular Movements: Extraocular movements intact. Conjunctiva/sclera: Conjunctivae normal.   Cardiovascular:      Rate and Rhythm: Normal rate and regular rhythm. Pulses: Normal pulses. Heart sounds: Normal heart sounds. No murmur heard. Pulmonary:      Effort: Pulmonary effort is normal. No respiratory distress. Breath sounds: Normal breath sounds. No wheezing. Abdominal:      General: Abdomen is flat. Bowel sounds are normal. There is no distension. Musculoskeletal:      Right lower leg: No edema. Left lower leg: No edema. Skin:     Capillary Refill: Capillary refill takes less than 2 seconds. Neurological:      General: No focal deficit present. Mental Status: He is alert and oriented to person, place, and time.    Psychiatric:         Mood and Affect: Mood normal.         Behavior: Behavior normal.      Consults:  IP CONSULT TO VASCULAR SURGERY  IP CONSULT TO HEM/ONC  IP CONSULT TO RESPIRATORY CARE  IP CONSULT TO CASE MANAGEMENT    Disposition: discharge home    Discharged Condition: stable    Follow Up: Brittni Rodriguez MD  [address] [facility] [place] [address]  [phone]    Schedule an appointment as soon as possible for a visit in 1 week(s)  hospital follow-up      Lab Frequency Next Occurrence   CBC Once a week 10/21/2022, 10/28/2022, 11/4/2022, 11/11/2022, 11/18/2022, 11/25/2022, 12/2/2022, 12/9/2022, 12/16/2022, 12/23/2022, 12/30/2022, 1/6/2023   CBC with Auto Differential Once a week 10/21/2022, 10/28/2022, 11/4/2022, 11/11/2022, 11/18/2022, 11/25/2022, 12/2/2022, 12/9/2022, 12/16/2022, 12/23/2022, 12/30/2022, 1/6/2023   Up as tolerated PRN    Initiate Oxygen Therapy Protocol PRN    Up with assistance PRN    POCT glucose 4 TIMES DAILY BEFORE MEALS & AT BEDTIME    HYPOGLYCEMIA TREATMENT: blood glucose LESS THAN 70 mg/dL and patient ALERT and TOLERATING PO PRN    HYPOGLYCEMIA TREATMENT: blood glucose LESS THAN 70 mg/dL and patient NOT ALERT or NPO PRN    POCT Glucose PRN        Diet: ADULT DIET; Regular    Discharge Medications:      Medication List        CHANGE how you take these medications      capecitabine 500 MG chemo tablet  Commonly known as: Xeloda  Take 4 tablets by mouth 2 times a day for for 14 days of a 21 day cycle  What changed: Another medication with the same name was removed. Continue taking this medication, and follow the directions you see here.             CONTINUE taking these medications      allopurinol 300 MG tablet  Commonly known as: ZYLOPRIM     atenolol 25 MG tablet  Commonly known as: TENORMIN     Enbrel SureClick 50 MG/ML Soaj  Generic drug: Etanercept     folic acid 1 MG tablet  Commonly known as: FOLVITE     lisinopril-hydroCHLOROthiazide 20-25 MG per tablet  Commonly known as: PRINZIDE;ZESTORETIC     predniSONE 5 MG tablet  Commonly known as: DELTASONE     rosuvastatin 10 MG tablet  Commonly known as: CRESTOR     Xigduo XR  MG Tb24  Generic drug: Dapagliflozin-metFORMIN HCl ER              Time Spent on discharge is  30 minutes in patient examination, evaluation, counseling as well as medication reconciliation, prescriptions for required medications, discharge planning and follow up.     Electronically signed by Galina Landin MD on 10/15/2022 at 2:05 PM

## 2022-10-18 ENCOUNTER — CARE COORDINATION (OUTPATIENT)
Dept: CASE MANAGEMENT | Age: 47
End: 2022-10-18

## 2022-10-18 NOTE — CARE COORDINATION
[facility] Care Transitions Initial Follow Up Call    Call within 2 business days of discharge: Yes  Patient: Keri Land Patient : 1975   MRN: [id number]  Reason for Admission: Pulmonary Embolism  Discharge Date: 10/15/22 RARS: Readmission Risk Score: 10.4      Last Discharge  Street       Date Complaint Diagnosis Description Type Department Provider    10/13/22 Pulmonary Embolism Bilateral pulmonary embolism ([facility] [address] [address] . .. ED to Hosp-Admission (Discharged) (ADMIT) [facility] PANDA Cadena MD; Norma Perla . .. Attempted to contact patient for initial follow up transition call. Left voicemail message to return call with an update on condition since discharge. Contact information provided. Will try again.         Care Transitions 24 Hour Call    Care Transitions Interventions         Follow Up  Future Appointments   Date Time Provider Nicolette Maciel   2022  8:30 AM SCHEDULE, [facility] MED ONC MA [facility] MED ONC [facility]   2022  8:45 AM [facility] Blackwood [facility]     Kim Hammond LPN

## 2022-10-19 ENCOUNTER — CARE COORDINATION (OUTPATIENT)
Dept: CASE MANAGEMENT | Age: 47
End: 2022-10-19

## 2022-10-19 LAB
ANTICARDIOLIPIN IGA ANTIBODY: 4.4 APL (ref 0–14)
ANTICARDIOLIPIN IGG ANTIBODY: 1.8 GPL (ref 0–10)
ANTICARDIOLIPIN IGM ANTIBODY: <0.8 MPL (ref 0–10)

## 2022-10-19 NOTE — CARE COORDINATION
[facility] Care Transitions Initial Follow Up Call    Call within 2 business days of discharge: Yes  Patient: Kylee Wall Patient : 1975   MRN: [id number]  Reason for Admission: Pulmonary Embolism  Discharge Date: 10/15/22 RARS: Readmission Risk Score: 10.4      Last Discharge  Street       Date Complaint Diagnosis Description Type Department Provider    10/13/22 Pulmonary Embolism Bilateral pulmonary embolism ([facility] [address] . .. ED to Hosp-Admission (Discharged) (ADMIT) [facility] PANDA Dominguez MD; Sandra Herndon . .. Second attempt to contact patient for initial follow up transition call. Left a HIPAA compliant voicemail message to return call. Contact information provided. No further outreach scheduled.           Care Transitions 24 Hour Call    Care Transitions Interventions         Follow Up  Future Appointments   Date Time Provider Nicolette Maciel   2022  8:30 AM SCHEDULE, [facility] MED ONC MA [facility] MED ONC [facility]   2022  8:45 AM [facility]        Cata Mejia LPN

## 2022-11-04 NOTE — PROGRESS NOTES
MEDICAL ONCOLOGY PROGRESS NOTE    Pt Name: Joy Modi  MRN: [id number]  YOB: 1975  Date of evaluation: 11/7/2022  History Obtained From:  patient and old medical records    HISTORY OF PRESENT ILLNESS:   The patient presents today for a follow-up visit. 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 He is currently on adjuvant treatment with Xeloda. The patient was recently hospitalized with incidental findings of pulmonary embolism. Of note, he had COVID-19 infection in August 2022. He had also been traveling. Risk factor may include chemotherapy. He had CT chest abdomen pelvis which showed no evidence of disease recurrence. He is a scheduled for another colonoscopy December 2022. Will we will repeat his CEA levels today. He denies any shortness of breath or chest pain. Diagnosis  Adenocarcinoma, sigmoid colon, April 2022  Well to moderately differentiated  pT4a pN0 cM0, stage II high risk  High risk features: pT4, LVI  IHC MMR (MLH1 and PMS2) Intermediate for MMR; No loss of MSH2 and MSH6 by Immunochemistry  [employer] 84 gene panel-[facility]  Covid-19 Aug 2022    Treatment Summary  4/8/22- Sigmoid colectomy with negative lymph node dissection by Dr. Elsa Gregory  5/13/22- Initiated adjuvant capecitabine 2 weeks on/1 week off x6-month    Cancer History  Carrol Osgood was first seen by me on 5/4/2022. He was referred by Dr. Elsa Gregory. The patient was found to have anemia and further work-up with colonoscopy revealed that he had a large mass in the transverse colon. 3/18/22 Colonoscopy/EGD by Dr. Darcie Reza: COLONOSCOPY: An infiltrative partially obstructing large mass was found in the transverse colon--estimated. The mass was circumferential. I could not get the scope through as past this area. Oozing was present. This was biopsied with a cold forceps for histology. Area was tattooed with an injection of Spot (carbon black). No additional abnormalities were found on retroflexion. EGD: Normal esophagus. Non-erosive gastritis. Biopsied. Normal examined duodenum. Biopsied. 3/18/22 CEA 0.78  3/18/22 Colon mass, site not otherwise specified, biopsy: Invasive moderately differentiated colonic adenocarcinoma with ulceration. Small bowel, biopsy:  Unremarkable small bowel mucosa. 3/29/22  CT abd/pelvis [facility]): Sigmoid colon mass with extraluminal extension is consistent with primary colon malignancy. No CT evidence of distant metastatic disease in the abdomen or pelvis. No evidence of liver masses. 3/29/22 CT chest [facility]): No evidence of metastatic disease in the chest. Bridging anterior osteophytes throughout the thoracic spine, suggestive of ankylosing spondylitis. 4/8/22 Sigmoid colon, sigmoid resection by Dr. Sosa Faye at [facility]: Well to moderately differentiated colorectal adenocarcinoma (6.3 cm), focally invasive into the visceral peritoneum through area of inflammation. Positive for focal lymphvascular invasion. Negative for perineural invasion. Twenty-two regional lymph nodes, negative for metastatic carcinoma (0/22). Incidental prominent mucosal fold with early superficial hyperplastic changes. Viable opposing surgical resection margins, negative for tumor. pTNM CLASSIFICATION:  pT4a pN0 cM0. Appendix, appendectomy: Appendix with diffuse intraluminal fibrosis and no other significant pathologic abnormalities. Negative for malignancy. 5/4/2022-he was first seen by me. Essentially, stage II high risk pT4, LVI. IHC MMR was not performed and was ordered today. 5/15/2022-anticipated adjuvant capecitabine x6-month  6/30/2022 CEA 1.2  7/30/2022 [employer] 84 Genetic Panel:VUS. No pathogenic mutation. Oct 2022- Hospitalized with Pulmonary embolism. Lovenox x 3 weeks to be followe by Eliquis. 10/11/2022 CT Chest W Contrast Bilateral large pulmonary embolus in both pulmonary arteries.  This is amenable for thrombolytic therapy which can be performed by me.  10/11/2022 CT Abd/Pelvis W IV Contrast (oral) No acute pathology seen in abdomen or pelvis. Unchanged from prior CT.  10/13/22 2D Echocardiogram  Left ventricle had normal chamber size and thickness Preserved systolic function with estimated ejection fraction of 60% No regional wall motion abnormality Normal diastolic function No significant valvular lesion seen. 10/13/2022-US bilateral lower extremity showed no evidence of acute DVT  10/14/2022-hospitalized at [facility] with pulmonary embolism. Incidental finding on surveillance CT chest.  He was started on IV heparin. Vascular consulted but recommended no thrombolytic. To be echo showed normal EF. No RV dilatation. Patient was discharged on Lovenox with transition to Eliquis after 3 weeks. Thrombophilia work-up will be performed in clinic. Past Medical History:    Diagnosis Date    Ankylosing spondylitis ([address]    Family history of colon cancer    GERD (gastroesophageal reflux disease)    Gout    Hyperlipidemia    Hypertension    Osteoarthritis    Sleep apnea   Colon cancer, stage II      Past Surgical History:    CHOLECYSTECTOMY   Dr. Foy    COLONOSCOPY N/A 03/18/2022   Likely malignant partially obstructing tumor in the transverse colon-biopsied-Tattooed; Repeat colonoscopy (date not yet determined) because the examination was incomplete    ENDOSCOPY    ENDOSCOPY N/A 03/18/2022   Normal esophagus; Non-erosive gastritis-biopsied;  Normal examined duodenum-biopsied     Social History:    Marital status:   Smoking status:No  ETOH status:No  Resides: [place], [place]    Family History:   Family History   Problem Relation Age of Onset    Cancer Brother         Colon    Cancer Paternal Uncle         Colon   Problem Relation Age of Onset    Cirrhosis Father 52    Alcohol abuse Father    Colon cancer Maternal Aunt   In her 42's    Alcohol abuse Paternal Aunt    Colon cancer Paternal Uncle   In his 63's    Alcohol abuse Paternal Uncle    Alcohol abuse Maternal Grandfather Colon polyps Neg Hx    Esophageal cancer Neg Hx    Liver cancer Neg Hx    Liver disease Neg Hx    Rectal cancer Neg Hx    Stomach cancer Neg Hx       Current Hospital Medications:    Current Outpatient Medications   Medication Sig Dispense Refill    ELIQUIS DVT/PE STARTER PACK 5 MG TBPK tablet 5 mg in the morning and at bedtime 2 pills in am and 2 pills in pm      predniSONE (DELTASONE) 5 MG tablet Take 5 mg by mouth daily      capecitabine (XELODA) 500 MG chemo tablet Take 4 tablets by mouth 2 times a day for for 14 days of a 21 day cycle 112 tablet 5    allopurinol (ZYLOPRIM) 300 MG tablet       lisinopril-hydroCHLOROthiazide (PRINZIDE;ZESTORETIC) 20-25 MG per tablet lisinopril 20 mg-hydrochlorothiazide 25 mg tablet      rosuvastatin (CRESTOR) 10 MG tablet       folic acid (FOLVITE) 1 MG tablet TAKE 1 TABLET BY MOUTH ONCE DAILY      Dapagliflozin-metFORMIN HCl ER (XIGDUO XR)  MG TB24 Xigduo XR 10 mg-1,000 mg tablet,extended release      atenolol (TENORMIN) 25 MG tablet atenolol 25 mg tablet       No current facility-administered medications for this visit.        Allergies: No Known Allergies      Subjective   REVIEW OF SYSTEMS:   CONSTITUTIONAL: no fever, no night sweats, fatigue;  HEENT: no blurring of vision, no double vision, no hearing difficulty, no tinnitus, no ulceration, no dysplasia, no epistaxis;   LUNGS: no cough, no hemoptysis, no wheeze,  no shortness of breath;  CARDIOVASCULAR: no palpitation, no chest pain, no shortness of breath;  GI: no abdominal pain, no nausea, no vomiting, no diarrhea, no constipation;  FAINA: no dysuria, no hematuria, no frequency or urgency, no nephrolithiasis;  MUSCULOSKELETAL: no joint pain, no swelling, no stiffness;  ENDOCRINE: no polyuria, no polydipsia, no cold or heat intolerance;  HEMATOLOGY: no easy bruising or bleeding, no history of clotting disorder;  DERMATOLOGY: no skin rash, no eczema, no pruritus;  PSYCHIATRY: no depression, no anxiety, no panic attacks, no suicidal ideation, no homicidal ideation;  NEUROLOGY: no syncope, no seizures, no numbness or tingling of hands, no numbness or tingling of feet, no paresis;     Objective   /70   Pulse 70   Ht 5' 4\" (1.626 m)   Wt 222 lb (100.7 kg)   SpO2 95%   BMI 38.11 kg/m²      PHYSICAL EXAM:  CONSTITUTIONAL: Alert, appropriate, no acute distress  EYES: Non icteric, EOM intact, pupils equal round   ENT: Mucus membranes moist, no oral pharyngeal lesions, external inspection of ears and nose are normal.  NECK: Supple, no masses. No palpable thyroid mass  CHEST/LUNGS: CTA bilaterally, normal respiratory effort   CARDIOVASCULAR: RRR, no murmurs. No lower extremity edema  ABDOMEN: soft non-tender, active bowel sounds, no HSM. No palpable masses  EXTREMITIES: warm, full ROM in all 4 extremities, no focal weakness. SKIN: warm, dry with no rashes or lesions  LYMPH: No cervical, clavicular, axillary, or inguinal lymphadenopathy  NEUROLOGIC: follows commands, non focal   PSYCH: mood and affect appropriate. Alert and oriented to time, place, person    LABORATORY RESULTS REVIEWED/ANALYZED BY ME:  11/7/2022 CBC  WBC 8.3  HGB 13    Neut 5.2    RADIOLOGY STUDIES REVIEWED BY ME:  10/11/2022 CT Chest W Contrast Bilateral large pulmonary embolus in both pulmonary arteries. This is amenable for thrombolytic therapy which can be performed by me    10/11/2022 CT Abd/Pelvis W IV Contrast (oral) No acute pathology seen in abdomen or pelvis.  Unchanged from prior CT.    10/13/22 2D Echocardiogram  Left ventricle had normal chamber size and thickness Preserved systolic function with estimated ejection fraction of 60% No regional wall motion abnormality Normal diastolic function No significant valvular lesion seen    ASSESSMENT:    Orders Placed This Encounter   Procedures    Thrombotic Risk Reflexive Panel     Standing Status:   Future     Standing Expiration Date:   11/7/2023    JAK2 V617F Mutation Analysis, Qual rflx CALR/Exon 12-15/MPL     Standing Status:   Future     Standing Expiration Date:   11/7/2023    CEA     Standing Status:   Future     Standing Expiration Date:   11/7/2023    Comprehensive Metabolic Panel     Standing Status:   Future     Standing Expiration Date:   11/7/2023    Ferritin     Standing Status:   Standing     Number of Occurrences:   1    Iron and TIBC     Standing Status:   Standing     Number of Occurrences:   1117 Raheem Blum was seen today for follow-up. Diagnoses and all orders for this visit:    Adenocarcinoma of sigmoid colon ([facility] [address]  -     CEA; Future  -     Comprehensive Metabolic Panel; Future  -     Ferritin; Standing  -     Iron and TIBC; Standing  -     Ferritin  -     Iron and TIBC    Care plan discussed with patient  -     CEA; Future  -     Comprehensive Metabolic Panel; Future  -     Ferritin; Standing  -     Ferritin    Chemotherapy management, encounter for  -     CEA; Future  -     Comprehensive Metabolic Panel; Future    Adverse effect of chemotherapy, subsequent encounter  -     CEA; Future  -     Comprehensive Metabolic Panel; Future    Other pulmonary embolism without acute cor pulmonale, unspecified chronicity (HCC)  -     Thrombotic Risk Reflexive Panel; Future  -     JAK2 V617F Mutation Analysis, Qual rflx CALR/Exon 12-15/MPL; Future    Iron deficiency anemia, unspecified iron deficiency anemia type  -     Ferritin; Standing  -     Iron and TIBC; Standing  -     Ferritin  -     Iron and TIBC     Stage II, pT4 N0 M0, LVI, IHC MMR pending  Essentially, stage II high risk colon cancer (high risk features: pT4, lymphovascular invasion). IHC MMR is pending. Lengthy discussion about NCCN guidelines, ASCO and risk factors. Stage II high risk colon cancer  High risk features: Lymphovascular invasion, pT4   Discussed NCCN guidelines     Recommended:  Capecitabine 1000 mg/m2 PO twice daily 2 weeks on/1 week of  8 cycles equal 6-month  -Continue adjuvant capecitabine.   No change in the dosage.  -Repeat CT chest/abdomen/pelvis in 4 weeks    Proceed cycle #8 Adjuvant Xeloda    Iron deficiency anemia-secondary to GI bleed. Improved  -Hemoglobin 8.2/MCV 76 on 4/15/2022  -Hemoglobin 11.3/MCV 80 on 5/4/2022  -Hemoglobin 11.7/MCV 84.3 on 6/30/2022  -Recommended iron sulfate 325 mg p.o. daily  Lab Results   Component Value Date    WBC 8.30 11/07/2022    HGB 13.0 (L) 11/07/2022    HCT 38.7 (L) 11/07/2022    .5 (H) 11/07/2022     11/07/2022       Dixon syndrome screening  IHC MMR-could not be determined. No loss of MSH2/MSH6 by IHC  -Indeterminate for MMR (MLH1 and PMS2) by IHC  -[employer]ae genetic test-no pathogenic mutation. Findings of VUS MUTHY    #Pulmonary embolism, provoked ( [place], Aug 2022)   Risk factors-chemotherapy, recent history of cancer  -Vascular consultation for consideration of intra-arterial thrombolytic therapy  -IV heparin  -Bilateral lower extremity ultrasound-no evidence of acute DVT  -10/14/2022-2D echo showed no evidence of RV dilatation. Normal EF. Otherwise unremarkable  10/15/2022- Continue Eliquis 10 mg p.o. twice daily x7 days to be followed by 5 mg p.o. twice daily     Stage II, pT4 N0 M0, LVI, IHC MMR pending  Essentially, stage II high risk colon cancer (high risk features: pT4, lymphovascular invasion). IHC MMR is pending.     Lengthy discussion about NCCN guidelines, ASCO and risk factors  High risk features: Lymphovascular invasion, pT4 Discussed NCCN guidelines  -Repeat CEA today and CT scans in April 2023     Patient is currently on adjuvant treatment:  Capecitabine 1000 mg/m2 PO twice daily 2 weeks on/1 week of  8 cycles equal 6-month  -He will resume treatment today, cycle #8    PLAN:  Continue Eliquis 10 mg p.o. twice daily x7 days to be followed by 5 mg p.o. twice daily  Resume chemotherapy next week  RTC MD January 2023  Thrombophilia and LANEY-2 with reflex work-up today  Colonoscopy in December with Dr. Glen Roy as per patient  Repeat scans April,Order CTA chest at that time. PLAN:  RTC with MD 2 month. CBC CMP, Iron Studies, Ferritin,CEA, JAK2, and Thrombolytic panel  today  Continue Capecitabine 2 weeks on/1 week off  Continue OTC Ferrous sulfate 325mg daily  Repeat CT scans every 6 months     Scooter ENGLE am pre charting  as Medical Assistant for Amari Hart MD. Electronically signed by Scooter Green MA on 11/7/2022 at 10:00 AM CDT. Mook Vicente am scribing as Medical Assistant for Amari Hart MD. Electronically signed by Scooter Green MA on 11/7/2022 at 10:08 AM CST. I, Dr Nathan Rodriguez, personally performed the services described in this documentation as scribed by Scooter Green MA in my presence and is both accurate and complete. I have seen, examined and reviewed this patient medication list, appropriate labs and imaging studies. I reviewed relevant medical records and others physicians notes. I discussed the plans of care with the patient. I answered all the questions to the patients satisfaction. I have also reviewed the chief complaint (CC) and part of the history (History of Present Illness (HPI), Past Family Social History [facility]), or Review of Systems (ROS) and made changes when appropriated.        (Please note that portions of this note were completed with a voice recognition program. Efforts were made to edit the dictations but occasionally words are mis-transcribed.)  Electronically signed by Amari Hart MD on 11/7/2022 at 10:18 AM

## 2022-11-07 ENCOUNTER — HOSPITAL ENCOUNTER (OUTPATIENT)
Dept: INFUSION THERAPY | Age: 47
Discharge: HOME | End: 2022-11-07
Payer: COMMERCIAL

## 2022-11-07 ENCOUNTER — OFFICE VISIT (OUTPATIENT)
Dept: HEMATOLOGY | Age: 47
End: 2022-11-07
Payer: COMMERCIAL

## 2022-11-07 VITALS
DIASTOLIC BLOOD PRESSURE: 70 MMHG | WEIGHT: 222 LBS | SYSTOLIC BLOOD PRESSURE: 120 MMHG | OXYGEN SATURATION: 95 % | BODY MASS INDEX: 37.9 KG/M2 | HEART RATE: 70 BPM | HEIGHT: 64 IN

## 2022-11-07 DIAGNOSIS — C18.7 ADENOCARCINOMA OF SIGMOID COLON (HCC): Primary | ICD-10-CM

## 2022-11-07 DIAGNOSIS — C18.7 ADENOCARCINOMA OF SIGMOID COLON (HCC): ICD-10-CM

## 2022-11-07 DIAGNOSIS — I26.99 OTHER PULMONARY EMBOLISM WITHOUT ACUTE COR PULMONALE, UNSPECIFIED CHRONICITY (HCC): ICD-10-CM

## 2022-11-07 DIAGNOSIS — Z51.11 CHEMOTHERAPY MANAGEMENT, ENCOUNTER FOR: ICD-10-CM

## 2022-11-07 DIAGNOSIS — Z71.89 CARE PLAN DISCUSSED WITH PATIENT: ICD-10-CM

## 2022-11-07 DIAGNOSIS — T45.1X5D ADVERSE EFFECT OF CHEMOTHERAPY, SUBSEQUENT ENCOUNTER: ICD-10-CM

## 2022-11-07 DIAGNOSIS — D50.9 IRON DEFICIENCY ANEMIA, UNSPECIFIED IRON DEFICIENCY ANEMIA TYPE: ICD-10-CM

## 2022-11-07 LAB
CEA: 1.7 NG/ML (ref 0–4.7)
HCT VFR BLD CALC: 38.7 % (ref 40.1–51)
HEMOGLOBIN: 13 G/DL (ref 13.7–17.5)
LYMPHOCYTES ABSOLUTE: 1.6 K/UL (ref 1.18–3.74)
LYMPHOCYTES RELATIVE PERCENT: 18.8 % (ref 19.3–53.1)
MCH RBC QN AUTO: 34.8 PG (ref 25.7–32.2)
MCHC RBC AUTO-ENTMCNC: 33.6 G/DL (ref 32.3–36.5)
MCV RBC AUTO: 103.5 FL (ref 79–92.2)
MONOCYTES ABSOLUTE: 1.5 K/UL (ref 0.24–0.82)
MONOCYTES RELATIVE PERCENT: 17.6 % (ref 4.7–12.5)
NEUTROPHILS ABSOLUTE: 5.2 K/UL (ref 1.56–6.13)
NEUTROPHILS RELATIVE PERCENT: 63.6 % (ref 34–71.1)
PDW BLD-RTO: 20.1 % (ref 11.6–14.4)
PLATELET # BLD: 229 K/UL (ref 163–337)
PMV BLD AUTO: 10.2 FL (ref 7.4–10.4)
RBC # BLD: 3.74 M/UL (ref 4.63–6.08)
WBC # BLD: 8.3 K/UL (ref 4.23–9.07)

## 2022-11-07 PROCEDURE — 85025 COMPLETE CBC W/AUTO DIFF WBC: CPT

## 2022-11-07 PROCEDURE — 99214 OFFICE O/P EST MOD 30 MIN: CPT | Performed by: INTERNAL MEDICINE

## 2022-11-07 PROCEDURE — 3074F SYST BP LT 130 MM HG: CPT | Performed by: INTERNAL MEDICINE

## 2022-11-07 PROCEDURE — 3078F DIAST BP <80 MM HG: CPT | Performed by: INTERNAL MEDICINE

## 2022-11-07 PROCEDURE — 36415 COLL VENOUS BLD VENIPUNCTURE: CPT

## 2022-11-07 PROCEDURE — 99212 OFFICE O/P EST SF 10 MIN: CPT

## 2022-11-07 RX ORDER — APIXABAN 5 MG (74)
5 KIT ORAL 2 TIMES DAILY
COMMUNITY
Start: 2022-10-14

## 2022-11-07 RX ORDER — APIXABAN 5 MG/1
5 TABLET, FILM COATED ORAL 2 TIMES DAILY
COMMUNITY
Start: 2022-10-28 | End: 2022-11-07 | Stop reason: CLARIF

## 2022-11-13 LAB
ACTIVATED PROTEIN C RESISTANCE: 2.68
ANTICARDIOLIPIN IGG ANTIBODY: <10 GPL
ANTITHROMBIN ACTIVITY: 110 % (ref 76–128)
BETA-2 GLYCOPROTEIN 1 IGG ANTIBODY: <10 SGU
BETA-2 GLYCOPROTEIN 1 IGM ANTIBODY: <10 SMU
CARDIOLIPIN AB IGM: <10 MPL
DRVVT CONFIRMATION TEST: NEGATIVE RATIO
DRVVT SCREEN: 63 SEC (ref 33–44)
DRVVT,DIL: 52 SEC (ref 33–44)
FACTOR V LEIDEN: ABNORMAL
FACV SPECIMEN: ABNORMAL
HEXAGONAL PHOSPHOLIPID NEUTRALIZAT TEST: NEGATIVE
HOMOCYSTEINE: 11 UMOL/L (ref 0–15)
LUPUS ANTICOAG INTERP: ABNORMAL
PLT NEUTA: ABNORMAL
PROTEIN C FUNCTIONAL: 243 % (ref 83–168)
PROTEIN S ANTIGEN, FREE: 198 % (ref 74–147)
PROTHROMBIN G20210A MUTATION: NEGATIVE
PT D: 16.4 SEC (ref 12–15.5)
PT PCR SPECIMEN: ABNORMAL
PTT D: 53 SEC (ref 32–48)
PTT-D CORR REFLEX: ABNORMAL SEC (ref 32–48)
PTT-HEPARIN NEUTRALIZED: 46 SEC (ref 32–48)
REPTILASE TIME: 17.3 SEC
THROMBIN TIME: 19.7 SEC (ref 14.7–19.5)
THROMBOSIS INTERPRETATION: ABNORMAL

## 2022-11-18 LAB
BACKGROUND, 480093: NORMAL
BACKGROUND, 489163: NORMAL
CALR MUTATION DETECTION RESULT, 489451: NORMAL
DIRECTOR REVIEW: NORMAL
EXTRACTION: NORMAL
JAK2 EXONS 12-15 MUT DET PCR: NORMAL
JAK2 V617F MUTATION DETECTION 489201: NORMAL
Lab: NORMAL
METHOD: NORMAL
MPL MUTATION ANALYSIS RESULT: NORMAL
REFERENCES: NORMAL
REFERENCES: NORMAL
REFLEX: NORMAL

## 2022-11-28 ENCOUNTER — TELEPHONE (OUTPATIENT)
Dept: GASTROENTEROLOGY | Facility: CLINIC | Age: 47
End: 2022-11-28

## 2022-11-28 NOTE — TELEPHONE ENCOUNTER
"Pt called to r/s his colonoscopy to 12/23. Since I've last spoke to pt, he has been put on eliquis. Pt states \"he has had 2 pulmonary embolisms.\" Dr. Burciaga prescribed lovenox to him for 3 weeks and now has him on eliquis 2x a day. Pt also tells me Dr. Castellanos has been part of this as well. Pt tells me that Dr. Castellanos told him that \"we will probably want him to hold his eliquis, but could take lovenox injections in place of it.\" I told pt that I would need to send Dr. Burciaga a clearance letter before we would be able to scope since he is the prescribing dr. Pt is aware I will call him once Dr. Burciaga gives me an answer about holding his eliquis.  "

## 2022-12-20 ENCOUNTER — TELEPHONE (OUTPATIENT)
Dept: GASTROENTEROLOGY | Facility: CLINIC | Age: 47
End: 2022-12-20

## 2022-12-20 NOTE — TELEPHONE ENCOUNTER
Pt is scheduled for colon this Fri 12/23/2022 and is on Eliquis. We hadn't heard back from Dr. Burciaga's office about the clearance for Eliquis so I called his office at 8:30am and left  for nurseBossman Marin with Dr. Burciaga's office just now called me back-he said that Dr. Burciaga gave the ok for him to hold the Eliquis X 2 days but he will need Lovenox injections-they will call that in to [facility] and send pt a text with instructions.     I called pt to let him know-was unable to reach him so I left detailed VM for him updating him about clearance and need for Lovenox. I did ask on  that he call me back to let me know he got my message and to call Dr. Burciaga's office with any questions/issues with Lovenox. Tomas is faxing me a copy of clearance and I will forward to Dr. Olson once that is received.

## 2022-12-29 ENCOUNTER — TELEPHONE (OUTPATIENT)
Dept: GASTROENTEROLOGY | Facility: CLINIC | Age: 47
End: 2022-12-29

## 2022-12-29 NOTE — TELEPHONE ENCOUNTER
Called pt to r/s colonoscopy that was originally on 12/23, but had to leave a vm for him to return my call. Will place pt in recall in case he does not call back before then.

## 2023-01-05 NOTE — PROGRESS NOTES
MEDICAL ONCOLOGY PROGRESS NOTE    Pt Name: Alie Aguillon  MRN: [id number]  YOB: 1975  Date of evaluation: 3/6/2023  History Obtained From:  patient and old medical records    HISTORY OF PRESENT ILLNESS:   The patient presents today for a follow-up visit. 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 He just completed his adjuvant Xeloda in November 2022. He was hospitalized in October 2022 with unprovoked pulmonary embolism. The only risk factor was a history of COVID in August 2022 possible chemotherapy. She was started on Eliquis. He is tolerating Eliquis well. Denies any bleeding. He is scheduled for his 1 year colonoscopy with Dr. Milka Nieto at [facility]. He is also contemplating hip surgery in the near future. Diagnosis  Adenocarcinoma, sigmoid colon, April 2022  Well to moderately differentiated  pT4a pN0 cM0, stage II high risk  High risk features: pT4, LVI  IHC MMR (MLH1 and PMS2) Intermediate for MMR; No loss of MSH2 and MSH6 by Immunochemistry  Invitae 84 gene panel-[facility]  Covid-19 Aug 2022  Pulmonary Embolism, 10/11/22    Treatment Summary  4/8/22- Sigmoid colectomy with negative lymph node dissection by Dr. Everardo Buck  5/13/22- 11/19/22 Completion of adjuvant capecitabine 2 weeks on/1 week off x6-month    Cancer History  Virginia Terrazas was first seen by me on 5/4/2022. He was referred by Dr. Everardo Buck. The patient was found to have anemia and further work-up with colonoscopy revealed that he had a large mass in the transverse colon. 3/18/22 Colonoscopy/EGD by Dr. Milka Nieto: COLONOSCOPY: An infiltrative partially obstructing large mass was found in the transverse colon--estimated. The mass was circumferential. I could not get the scope through as past this area. Oozing was present. This was biopsied with a cold forceps for histology. Area was tattooed with an injection of Spot (carbon black). No additional abnormalities were found on retroflexion. EGD: Normal esophagus. Non-erosive gastritis. Biopsied. Normal examined duodenum. Biopsied. 3/18/22 CEA 0.78  3/18/22 Colon mass, site not otherwise specified, biopsy: Invasive moderately differentiated colonic adenocarcinoma with ulceration. Small bowel, biopsy:  Unremarkable small bowel mucosa. 3/29/22  CT abd/pelvis [facility]): Sigmoid colon mass with extraluminal extension is consistent with primary colon malignancy. No CT evidence of distant metastatic disease in the abdomen or pelvis. No evidence of liver masses. 3/29/22 CT chest [facility]): No evidence of metastatic disease in the chest. Bridging anterior osteophytes throughout the thoracic spine, suggestive of ankylosing spondylitis. 4/8/22 Sigmoid colon, sigmoid resection by Dr. Blessing Mckenna at [facility]: Well to moderately differentiated colorectal adenocarcinoma (6.3 cm), focally invasive into the visceral peritoneum through area of inflammation. Positive for focal lymphvascular invasion. Negative for perineural invasion. Twenty-two regional lymph nodes, negative for metastatic carcinoma (0/22). Incidental prominent mucosal fold with early superficial hyperplastic changes. Viable opposing surgical resection margins, negative for tumor. pTNM CLASSIFICATION:  pT4a pN0 cM0. Appendix, appendectomy: Appendix with diffuse intraluminal fibrosis and no other significant pathologic abnormalities. Negative for malignancy. 5/4/2022-he was first seen by me. Essentially, stage II high risk pT4, LVI. IHC MMR was not performed and was ordered today. 5/15/2022-anticipated adjuvant capecitabine x6-month  6/30/2022 CEA 1.2  7/30/2022 Invitae 84 Genetic Panel:VUS. No pathogenic mutation. Oct 2022- Hospitalized with Pulmonary embolism. Lovenox x 3 weeks to be followe by Eliquis. 10/11/2022 CT Chest W Contrast Bilateral large pulmonary embolus in both pulmonary arteries.  This is amenable for thrombolytic therapy which can be performed by me.  10/11/2022 CT Abd/Pelvis W IV Contrast (oral) No acute pathology seen in abdomen or pelvis. Unchanged from prior CT.  10/13/22 2D Echocardiogram  Left ventricle had normal chamber size and thickness Preserved systolic function with estimated ejection fraction of 60% No regional wall motion abnormality Normal diastolic function No significant valvular lesion seen. 10/13/2022-US bilateral lower extremity showed no evidence of acute DVT  10/14/2022-hospitalized at [facility] with pulmonary embolism. Incidental finding on surveillance CT chest.  He was started on IV heparin. Vascular consulted but recommended no thrombolytic. To be echo showed normal EF. No RV dilatation. Patient was discharged on Lovenox with transition to Eliquis after 3 weeks. Thrombophilia work-up will be performed in clinic. 11/7/22 JAK2 V167F Mutation: Negative  11/7/22 Thrombotic Panel: Antithrombin Activity 110, Protein C 243,Protein S 198, PTD 16.4,Thrombin Time 19.7, Lupus anticoagulant not detected, prothrombin gene mutation negative. Anticardiolipin and beta-2 glycoprotein normal.  11/7/22 CEA 1.7  11/19/22 Completion of adjuvant capecitabine 2 weeks on/1 week off x6-month    Past Medical History:    Diagnosis Date    Ankylosing spondylitis ([address]    Family history of colon cancer    GERD (gastroesophageal reflux disease)    Gout    Hyperlipidemia    Hypertension    Osteoarthritis    Sleep apnea   Colon cancer, stage II      Past Surgical History:    CHOLECYSTECTOMY   Dr. Andie Aldrich    COLONOSCOPY N/A 03/18/2022   Likely malignant partially obstructing tumor in the transverse colon-biopsied-Tattooed; Repeat colonoscopy (date not yet determined) because the examination was incomplete    ENDOSCOPY    ENDOSCOPY N/A 03/18/2022   Normal esophagus; Non-erosive gastritis-biopsied;  Normal examined duodenum-biopsied     Social History:    Marital status:   Smoking status:No  ETOH status:No  Resides: [place], [place]    Family History:   Family History   Problem Relation Age of Onset Cancer Brother         Colon    Cancer Paternal Uncle         Colon   Problem Relation Age of Onset    Cirrhosis Father 47    Alcohol abuse Father    Colon cancer Maternal Aunt   In her 40's    Alcohol abuse Paternal Aunt    Colon cancer Paternal Uncle   In his 60's    Alcohol abuse Paternal Uncle    Alcohol abuse Maternal Grandfather    Colon polyps Neg Hx    Esophageal cancer Neg Hx    Liver cancer Neg Hx    Liver disease Neg Hx    Rectal cancer Neg Hx    Stomach cancer Neg Hx       Current Hospital Medications:    Current Outpatient Medications   Medication Sig Dispense Refill    ELIQUIS DVT/PE STARTER PACK 5 MG TBPK tablet 5 mg in the morning and at bedtime 2 pills in am and 2 pills in pm      predniSONE (DELTASONE) 5 MG tablet Take 5 mg by mouth daily      allopurinol (ZYLOPRIM) 300 MG tablet       lisinopril-hydroCHLOROthiazide (PRINZIDE;ZESTORETIC) 20-25 MG per tablet lisinopril 20 mg-hydrochlorothiazide 25 mg tablet      rosuvastatin (CRESTOR) 10 MG tablet       folic acid (FOLVITE) 1 MG tablet TAKE 1 TABLET BY MOUTH ONCE DAILY      Dapagliflozin-metFORMIN HCl ER (XIGDUO XR)  MG TB24 Xigduo XR 10 mg-1,000 mg tablet,extended release      atenolol (TENORMIN) 25 MG tablet atenolol 25 mg tablet      capecitabine (XELODA) 500 MG chemo tablet Take 4 tablets by mouth 2 times a day for for 14 days of a 21 day cycle 112 tablet 5     No current facility-administered medications for this visit.       Allergies: No Known Allergies      Subjective   REVIEW OF SYSTEMS:   CONSTITUTIONAL: no fever, no night sweats, fatigue;  HEENT: no blurring of vision, no double vision, no hearing difficulty, no tinnitus, no ulceration, no dysplasia, no epistaxis;   LUNGS: no cough, no hemoptysis, no wheeze,  no shortness of breath;  CARDIOVASCULAR: no palpitation, no chest pain, no shortness of breath;  GI: no abdominal pain, no nausea, no vomiting, no diarrhea, no constipation;  FAINA: no dysuria, no hematuria, no frequency or  urgency, no nephrolithiasis;  MUSCULOSKELETAL: no joint pain, no swelling, no stiffness;  ENDOCRINE: no polyuria, no polydipsia, no cold or heat intolerance;  HEMATOLOGY: no easy bruising or bleeding, no history of clotting disorder;  DERMATOLOGY: no skin rash, no eczema, no pruritus;  PSYCHIATRY: no depression, no anxiety, no panic attacks, no suicidal ideation, no homicidal ideation;  NEUROLOGY: no syncope, no seizures, no numbness or tingling of hands, no numbness or tingling of feet, no paresis;     Objective   /82   Pulse 77   Temp 99.1 °F (37.3 °C) (Oral)   Ht 5' 4\" (1.626 m)   Wt 223 lb 4.8 oz (101.3 kg)   SpO2 95%   BMI 38.33 kg/m²      PHYSICAL EXAM:  CONSTITUTIONAL: Alert, appropriate, no acute distress  EYES: Non icteric, EOM intact, pupils equal round   ENT: Mucus membranes moist, no oral pharyngeal lesions, external inspection of ears and nose are normal.  NECK: Supple, no masses. No palpable thyroid mass  CHEST/LUNGS: CTA bilaterally, normal respiratory effort   CARDIOVASCULAR: RRR, no murmurs. No lower extremity edema  ABDOMEN: soft non-tender, active bowel sounds, no HSM. No palpable masses  EXTREMITIES: warm, full ROM in all 4 extremities, no focal weakness. SKIN: warm, dry with no rashes or lesions  LYMPH: No cervical, clavicular, axillary, or inguinal lymphadenopathy  NEUROLOGIC: follows commands, non focal   PSYCH: mood and affect appropriate.   Alert and oriented to time, place, person    LABORATORY RESULTS REVIEWED/ANALYZED BY ME:  11/7/22 JAK2 V167F Mutation: Negative    11/7/22 Thrombotic Panel: Antithrombin Activity 110, Protein C 243,Protein S 198, PTD 16.4,Thrombin Time 19.7, Lupus anticoagulant not detected    11/7/22 CEA 1.7    3/6/23 CBC  WBC 6.19  HGB 15.3  HCT 48.3    NEUT 4.09    RADIOLOGY STUDIES REVIEWED BY ME:  None    ASSESSMENT:    Orders Placed This Encounter   Procedures    CTA CHEST W WO CONTRAST     Standing Status:   Future     Standing Expiration Date:   9/6/2024     Scheduling Instructions:      [address] AFTER 4/11/23     Order Specific Question:   STAT Creatinine as needed:     Answer:   Yes     Order Specific Question:   Reason for exam:     Answer:   COMPARE TO PREVIOUS SCANS TO ASSESS PREVIOUS PULMONARY EMBOLISM    CT ABDOMEN PELVIS W IV CONTRAST Additional Contrast? Oral     Standing Status:   Future     Standing Expiration Date:   9/6/2024     Scheduling Instructions:      [address] AFTER 4/11/23     Order Specific Question:   Additional Contrast?     Answer:   Oral     Order Specific Question:   STAT Creatinine as needed:     Answer:   Yes     Order Specific Question:   Reason for exam:     Answer:   COMPARE TO PREVIOUS SCANS TO ASSESS RESPONSE TO TREATMENT FOR COLON CANCER    CEA     Standing Status:   Future     Standing Expiration Date:   3/6/2024    Comprehensive Metabolic Panel     Standing Status:   Future     Standing Expiration Date:   3/6/2024         Aminta Pfeiffer was seen today for follow-up. Diagnoses and all orders for this visit:    Adenocarcinoma of sigmoid colon ([address]  -     CEA; Future  -     Comprehensive Metabolic Panel; Future  -     CT ABDOMEN PELVIS W IV CONTRAST Additional Contrast? Oral; Future    Other pulmonary embolism without acute cor pulmonale, unspecified chronicity ([address]  -     CTA CHEST W WO CONTRAST; Future    Care plan discussed with patient    Chronic anticoagulation       Stage II, pT4 N0 M0, LVI, Indeterminate for MMR (MLH1 and PMS2) by IHC  Essentially, stage II high risk colon cancer (high risk features: pT4, lymphovascular invasion). Indeterminate for MMR (MLH1 and PMS2) by IHC  Lengthy discussion about NCCN guidelines, ASCO and risk factors  High risk features: Lymphovascular invasion, pT4 Discussed NCCN guidelines  -Discussed about CapeOx versus Xarelto. Patient opted for Xeloda x6 months.  -Completion adjuvant Xeloda November 2022.  -Proceed with 1 year colonoscopy    Iron deficiency anemia-secondary to GI bleed.   Lab Results   Component Value Date    WBC 6.19 03/06/2023    HGB 15.3 03/06/2023    HCT 48.3 03/06/2023    MCV 94.9 (H) 03/06/2023     03/06/2023   -Resolved    Dixon syndrome screening  IHC MMR-could not be determined. No loss of MSH2/MSH6 by IHC  -Indeterminate for MMR (MLH1 and PMS2) by IHC  -Invitae genetic test-no pathogenic mutation. Findings of VUS MUTHY    #Pulmonary embolism, provoked (Covid??), Aug 2022)   Risk factors-chemotherapy, recent history of cancer, history of COVID August 2022  -Vascular consultation for consideration of intra-arterial thrombolytic therapy  -IV heparin  -Bilateral lower extremity ultrasound-no evidence of acute DVT  -10/14/2022-2D echo showed no evidence of RV dilatation. Normal EF. Otherwise unremarkable  10/15/2022- Continue Eliquis 10 mg p.o. twice daily x7 days to be followed by 5 mg p.o. twice daily  Recommended Eliquis lifetime as long as no active bleeding.  -Repeat CTA chest April 2023. Hypercalcemia  -Recheck CMP next week  -PTH  -SPEP , MM studies  -Hepatitis C profile     To complete  PLAN:  RTC with MD 4 months   CBC, CMP, CEA today  Continue OTC Ferrous sulfate 325mg daily  CTA chest and CT abd/pelvis April 2023  Continue Eliquis lifetime 5 mg p.o. twice daily    Fan ENGLE am pre charting  as Medical Assistant for Justin East MD. Electronically signed by Fan Rea MA on 3/6/2023 at 11:26 AM CST. Farzaneh Moses am scribing for Justin East MD. Electronically signed by Manju Garzon RN on 3/6/2023 at 8:34 AM CST. KADIE, Dr Nirali Hernandes, personally performed the services described in this documentation as scribed by Manju Garzon RN in my presence and is both accurate and complete. I have seen, examined and reviewed this patient medication list, appropriate labs and imaging studies. I reviewed relevant medical records and others physicians notes. I discussed the plans of care with the patient.  I answered all the questions to the patients satisfaction. I have also reviewed the chief complaint (CC) and part of the history (History of Present Illness (HPI), Past Family Social History  [facility] or Review of Systems (ROS) and made changes when appropriated. (Please note that portions of this note were completed with a voice recognition program. Efforts were made to edit the dictations but occasionally words are mis-transcribed. )Electronically signed by Saima Dejesus MD on 3/6/2023 at 9:00 AM

## 2023-02-03 ENCOUNTER — TELEPHONE (OUTPATIENT)
Dept: GASTROENTEROLOGY | Facility: CLINIC | Age: 48
End: 2023-02-03
Payer: COMMERCIAL

## 2023-02-03 NOTE — TELEPHONE ENCOUNTER
Pt is on my April colon recall list-his colon had to be cx'd 12/2022 due to weather. I tried to call him today to discuss-was unable to reach him so I left  asking him to call us back to discuss rescheduling.

## 2023-03-06 ENCOUNTER — OFFICE VISIT (OUTPATIENT)
Dept: HEMATOLOGY | Age: 48
End: 2023-03-06
Payer: COMMERCIAL

## 2023-03-06 ENCOUNTER — HOSPITAL ENCOUNTER (OUTPATIENT)
Dept: INFUSION THERAPY | Age: 48
Discharge: HOME | End: 2023-03-06
Payer: COMMERCIAL

## 2023-03-06 VITALS
OXYGEN SATURATION: 95 % | WEIGHT: 223.3 LBS | SYSTOLIC BLOOD PRESSURE: 110 MMHG | HEIGHT: 64 IN | TEMPERATURE: 99.1 F | BODY MASS INDEX: 38.12 KG/M2 | DIASTOLIC BLOOD PRESSURE: 82 MMHG | HEART RATE: 77 BPM

## 2023-03-06 DIAGNOSIS — C18.7 ADENOCARCINOMA OF SIGMOID COLON (HCC): ICD-10-CM

## 2023-03-06 DIAGNOSIS — Z71.89 CARE PLAN DISCUSSED WITH PATIENT: ICD-10-CM

## 2023-03-06 DIAGNOSIS — Z79.01 CHRONIC ANTICOAGULATION: ICD-10-CM

## 2023-03-06 DIAGNOSIS — T45.1X5D ADVERSE EFFECT OF CHEMOTHERAPY, SUBSEQUENT ENCOUNTER: ICD-10-CM

## 2023-03-06 DIAGNOSIS — E88.09 HYPERPROTEINEMIA: ICD-10-CM

## 2023-03-06 DIAGNOSIS — Z51.11 CHEMOTHERAPY MANAGEMENT, ENCOUNTER FOR: ICD-10-CM

## 2023-03-06 DIAGNOSIS — I26.99 OTHER PULMONARY EMBOLISM WITHOUT ACUTE COR PULMONALE, UNSPECIFIED CHRONICITY (HCC): ICD-10-CM

## 2023-03-06 DIAGNOSIS — E83.52 HYPERCALCEMIA: ICD-10-CM

## 2023-03-06 DIAGNOSIS — C18.7 ADENOCARCINOMA OF SIGMOID COLON (HCC): Primary | ICD-10-CM

## 2023-03-06 DIAGNOSIS — E83.52 HYPERCALCEMIA: Primary | ICD-10-CM

## 2023-03-06 LAB
ALBUMIN SERPL-MCNC: 4.9 G/DL (ref 3.5–5.2)
ALP BLD-CCNC: 92 U/L (ref 40–130)
ALT SERPL-CCNC: 21 U/L (ref 5–41)
ANION GAP SERPL CALCULATED.3IONS-SCNC: 13 MMOL/L (ref 7–19)
AST SERPL-CCNC: 20 U/L (ref 5–40)
BASOPHILS ABSOLUTE: 0.06 K/UL (ref 0.01–0.08)
BASOPHILS RELATIVE PERCENT: 1 % (ref 0.1–1.2)
BILIRUB SERPL-MCNC: 0.3 MG/DL (ref 0.2–1.2)
BUN BLDV-MCNC: 34 MG/DL (ref 6–20)
CALCIUM SERPL-MCNC: 10.8 MG/DL (ref 8.6–10)
CEA: 1.5 NG/ML (ref 0–4.7)
CHLORIDE BLD-SCNC: 96 MMOL/L (ref 98–111)
CO2: 31 MMOL/L (ref 22–29)
CREAT SERPL-MCNC: 1.1 MG/DL (ref 0.5–1.2)
EOSINOPHILS ABSOLUTE: 0.13 K/UL (ref 0.04–0.54)
EOSINOPHILS RELATIVE PERCENT: 2.1 % (ref 0.7–7)
GFR SERPL CREATININE-BSD FRML MDRD: >60 ML/MIN/{1.73_M2}
GLUCOSE BLD-MCNC: 98 MG/DL (ref 74–109)
HCT VFR BLD CALC: 48.3 % (ref 40.1–51)
HEMOGLOBIN: 15.3 G/DL (ref 13.7–17.5)
LYMPHOCYTES ABSOLUTE: 1.38 K/UL (ref 1.18–3.74)
LYMPHOCYTES RELATIVE PERCENT: 22.3 % (ref 19.3–53.1)
MCH RBC QN AUTO: 30.1 PG (ref 25.7–32.2)
MCHC RBC AUTO-ENTMCNC: 31.7 G/DL (ref 32.3–36.5)
MCV RBC AUTO: 94.9 FL (ref 79–92.2)
MONOCYTES ABSOLUTE: 0.51 K/UL (ref 0.24–0.82)
MONOCYTES RELATIVE PERCENT: 8.2 % (ref 4.7–12.5)
NEUTROPHILS ABSOLUTE: 4.09 K/UL (ref 1.56–6.13)
NEUTROPHILS RELATIVE PERCENT: 66.1 % (ref 34–71.1)
PDW BLD-RTO: 14.8 % (ref 11.6–14.4)
PLATELET # BLD: 214 K/UL (ref 163–337)
PMV BLD AUTO: 11.3 FL (ref 7.4–10.4)
POTASSIUM SERPL-SCNC: 4.7 MMOL/L (ref 3.5–5)
RBC # BLD: 5.09 M/UL (ref 4.63–6.08)
SODIUM BLD-SCNC: 140 MMOL/L (ref 136–145)
TOTAL PROTEIN: 8.8 G/DL (ref 6.6–8.7)
WBC # BLD: 6.19 K/UL (ref 4.23–9.07)

## 2023-03-06 PROCEDURE — 3079F DIAST BP 80-89 MM HG: CPT | Performed by: INTERNAL MEDICINE

## 2023-03-06 PROCEDURE — 99214 OFFICE O/P EST MOD 30 MIN: CPT | Performed by: INTERNAL MEDICINE

## 2023-03-06 PROCEDURE — 36415 COLL VENOUS BLD VENIPUNCTURE: CPT

## 2023-03-06 PROCEDURE — 3074F SYST BP LT 130 MM HG: CPT | Performed by: INTERNAL MEDICINE

## 2023-03-06 PROCEDURE — 36415 COLL VENOUS BLD VENIPUNCTURE: CPT | Performed by: INTERNAL MEDICINE

## 2023-03-06 PROCEDURE — 85025 COMPLETE CBC W/AUTO DIFF WBC: CPT

## 2023-03-06 PROCEDURE — 99212 OFFICE O/P EST SF 10 MIN: CPT

## 2023-03-07 DIAGNOSIS — E83.52 HYPERCALCEMIA: Primary | ICD-10-CM

## 2023-03-07 DIAGNOSIS — E88.09 HYPERPROTEINEMIA: ICD-10-CM

## 2023-03-13 DIAGNOSIS — E88.09 HYPERPROTEINEMIA: ICD-10-CM

## 2023-03-13 DIAGNOSIS — E83.52 HYPERCALCEMIA: ICD-10-CM

## 2023-03-13 LAB
ALP BLD-CCNC: 78 U/L (ref 40–130)
ANION GAP SERPL CALCULATED.3IONS-SCNC: 11 MMOL/L (ref 7–19)
C-REACTIVE PROTEIN: 1 MG/DL (ref 0–1)
CALCIUM SERPL-MCNC: 10 MG/DL (ref 8.4–10.2)
CHLORIDE BLD-SCNC: 98 MMOL/L (ref 98–111)
CO2: 31 MMOL/L (ref 22–29)
HAV IGM SER IA-ACNC: NORMAL
HEPATITIS B CORE IGM ANTIBODY: NORMAL
HEPATITIS B SURFACE ANTIGEN INTERPRETATION: NORMAL
HEPATITIS C ANTIBODY INTERPRETATION: NORMAL
PARATHYROID HORMONE INTACT: 40.3 PG/ML (ref 15–65)
SEDIMENTATION RATE, ERYTHROCYTE: 20 MM/HR (ref 0–10)
TOTAL PROTEIN: 7.9 G/DL (ref 6.3–8.2)
VITAMIN D 25-HYDROXY: 36.4 NG/ML

## 2023-03-13 PROCEDURE — 36415 COLL VENOUS BLD VENIPUNCTURE: CPT | Performed by: INTERNAL MEDICINE

## 2023-03-16 LAB
1,25(OH)2D3 SERPL-MCNC: 31.1 PG/ML (ref 19.9–79.3)
ALBUMIN SERPL-MCNC: 4.4 G/DL (ref 3.75–5.01)
ALPHA1 GLOB SERPL ELPH-MCNC: 0.33 G/DL (ref 0.19–0.46)
ALPHA2 GLOB SERPL ELPH-MCNC: 0.72 G/DL (ref 0.48–1.05)
B-GLOBULIN SERPL ELPH-MCNC: 1.25 G/DL (ref 0.48–1.1)
DEPRECATED KAPPA LC FREE/LAMBDA SER: 1.23 {RATIO} (ref 0.26–1.65)
GAMMA GLOB SERPL ELPH-MCNC: 1.49 G/DL (ref 0.62–1.51)
IGA SERPL-MCNC: 305 MG/DL (ref 68–408)
IGG SERPL-MCNC: 1621 MG/DL (ref 768–1632)
IGM SERPL-MCNC: 56 MG/DL (ref 35–263)
INTERPRETATION SERPL IFE-IMP: ABNORMAL
INTERPRETATION SERPL IFE-IMP: ABNORMAL
KAPPA LC FREE SER-MCNC: 29.9 MG/L (ref 3.3–19.4)
LAMBDA LC FREE SERPL-MCNC: 24.3 MG/L (ref 5.71–26.3)
PROT SERPL-MCNC: 8.2 G/DL (ref 6.3–8.2)

## 2023-07-28 NOTE — PROGRESS NOTES
MEDICAL ONCOLOGY PROGRESS NOTE    Pt Name: Nico Madison  MRN: [id number]  YOB: 1975  Date of evaluation: 8/21/2023  History Obtained From:  patient and old medical records    HISTORY OF PRESENT ILLNESS:   The patient presents today for a follow-up visit. 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 He just completed his adjuvant Xeloda in November 2022. He was hospitalized in October 2022 with unprovoked pulmonary embolism. The only risk factor was a history of COVID in August 2022 possible chemotherapy. She was started on Eliquis. He is tolerating Eliquis well. Denies any bleeding. He is scheduled for his 1 year colonoscopy with Dr. Nigel Zuñiga at [facility]. He is also contemplating hip surgery in the near future. Diagnosis  Adenocarcinoma, sigmoid colon, April 2022  Well to moderately differentiated  pT4a pN0 cM0, stage II high risk  High risk features: pT4, LVI  IHC MMR (MLH1 and PMS2) Intermediate for MMR; No loss of MSH2 and MSH6 by Immunochemistry  Invitae 84 gene panel-[facility]  Covid-19 Aug 2022  Pulmonary Embolism, 10/11/22    Treatment Summary  4/8/22- Sigmoid colectomy with negative lymph node dissection by Dr. Nitesh Bacon  5/13/22- 11/19/22 Completion of adjuvant capecitabine 2 weeks on/1 week off x6-month    Cancer History  Mike River was first seen by me on 5/4/2022. He was referred by Dr. Nitesh Bacon. The patient was found to have anemia and further work-up with colonoscopy revealed that he had a large mass in the transverse colon. 3/18/22 Colonoscopy/EGD by Dr. Nigel Zuñiga: COLONOSCOPY: An infiltrative partially obstructing large mass was found in the transverse colon--estimated. The mass was circumferential. I could not get the scope through as past this area. Oozing was present. This was biopsied with a cold forceps for histology. Area was tattooed with an injection of Spot (carbon black). No additional abnormalities were found on retroflexion. EGD: Normal esophagus.

## 2023-07-31 DIAGNOSIS — C18.7 ADENOCARCINOMA OF SIGMOID COLON (HCC): Primary | ICD-10-CM

## 2023-08-03 ENCOUNTER — HOSPITAL ENCOUNTER (OUTPATIENT)
Dept: CT IMAGING | Age: 48
Discharge: HOME | End: 2023-08-03
Payer: COMMERCIAL

## 2023-08-03 DIAGNOSIS — I26.99 OTHER PULMONARY EMBOLISM WITHOUT ACUTE COR PULMONALE, UNSPECIFIED CHRONICITY (HCC): ICD-10-CM

## 2023-08-03 DIAGNOSIS — C18.7 ADENOCARCINOMA OF SIGMOID COLON (HCC): ICD-10-CM

## 2023-08-03 LAB — CREAT SERPL-MCNC: 1 MG/DL (ref 0.3–1.3)

## 2023-08-03 PROCEDURE — 6360000004 HC RX CONTRAST MEDICATION: Performed by: INTERNAL MEDICINE

## 2023-08-03 PROCEDURE — 74177 CT ABD & PELVIS W/CONTRAST: CPT

## 2023-08-03 PROCEDURE — 71275 CT ANGIOGRAPHY CHEST: CPT

## 2023-08-03 PROCEDURE — 82565 ASSAY OF CREATININE: CPT

## 2023-08-03 RX ADMIN — IOPAMIDOL 90 ML: 755 INJECTION, SOLUTION INTRAVENOUS at 10:47

## 2023-08-17 DIAGNOSIS — C18.7 ADENOCARCINOMA OF SIGMOID COLON (HCC): Primary | ICD-10-CM

## 2023-08-21 ENCOUNTER — OFFICE VISIT (OUTPATIENT)
Dept: HEMATOLOGY | Age: 48
End: 2023-08-21
Payer: COMMERCIAL

## 2023-08-21 ENCOUNTER — HOSPITAL ENCOUNTER (OUTPATIENT)
Dept: INFUSION THERAPY | Age: 48
Discharge: HOME | End: 2023-08-21
Payer: COMMERCIAL

## 2023-08-21 VITALS
OXYGEN SATURATION: 98 % | BODY MASS INDEX: 37.23 KG/M2 | SYSTOLIC BLOOD PRESSURE: 120 MMHG | TEMPERATURE: 97.6 F | HEART RATE: 76 BPM | DIASTOLIC BLOOD PRESSURE: 82 MMHG | WEIGHT: 216.9 LBS

## 2023-08-21 DIAGNOSIS — C18.7 ADENOCARCINOMA OF SIGMOID COLON (HCC): ICD-10-CM

## 2023-08-21 DIAGNOSIS — I26.99 OTHER PULMONARY EMBOLISM WITHOUT ACUTE COR PULMONALE, UNSPECIFIED CHRONICITY (HCC): ICD-10-CM

## 2023-08-21 DIAGNOSIS — Z79.01 CHRONIC ANTICOAGULATION: ICD-10-CM

## 2023-08-21 DIAGNOSIS — Z71.89 CARE PLAN DISCUSSED WITH PATIENT: ICD-10-CM

## 2023-08-21 DIAGNOSIS — C18.7 ADENOCARCINOMA OF SIGMOID COLON (HCC): Primary | ICD-10-CM

## 2023-08-21 LAB
ALBUMIN SERPL-MCNC: 4.7 G/DL (ref 3.5–5.2)
ALP SERPL-CCNC: 91 U/L (ref 40–130)
ALT SERPL-CCNC: 27 U/L (ref 21–72)
ANION GAP SERPL CALCULATED.3IONS-SCNC: 16 MMOL/L (ref 7–19)
AST SERPL-CCNC: 27 U/L (ref 17–59)
BILIRUB SERPL-MCNC: 0.4 MG/DL (ref 0.2–1.3)
BUN SERPL-MCNC: 19 MG/DL (ref 9–20)
CALCIUM SERPL-MCNC: 9.8 MG/DL (ref 8.4–10.2)
CEA SERPL-MCNC: 1.1 NG/ML (ref 0–4.7)
CHLORIDE SERPL-SCNC: 98 MMOL/L (ref 98–111)
CO2 SERPL-SCNC: 30 MMOL/L (ref 22–29)
CREAT SERPL-MCNC: 0.9 MG/DL (ref 0.6–1.2)
ERYTHROCYTE [DISTWIDTH] IN BLOOD BY AUTOMATED COUNT: 13.8 % (ref 11.6–14.4)
GLOBULIN: 4 G/DL
GLUCOSE SERPL-MCNC: 100 MG/DL (ref 74–106)
HCT VFR BLD AUTO: 45.7 % (ref 40.1–51)
HGB BLD-MCNC: 15 G/DL (ref 13.7–17.5)
LYMPHOCYTES # BLD: 1.63 K/UL (ref 1.18–3.74)
LYMPHOCYTES NFR BLD: 22.9 % (ref 19.3–53.1)
MCH RBC QN AUTO: 29.8 PG (ref 25.7–32.2)
MCHC RBC AUTO-ENTMCNC: 32.8 G/DL (ref 32.3–36.5)
MCV RBC AUTO: 90.9 FL (ref 79–92.2)
MONOCYTES # BLD: 0.66 K/UL (ref 0.24–0.82)
MONOCYTES NFR BLD: 9.3 % (ref 4.7–12.5)
NEUTROPHILS # BLD: 4.52 K/UL (ref 1.56–6.13)
NEUTS SEG NFR BLD: 63.5 % (ref 34–71.1)
PLATELET # BLD AUTO: 151 K/UL (ref 163–337)
PMV BLD AUTO: 11.1 FL (ref 7.4–10.4)
POTASSIUM SERPL-SCNC: 3.7 MMOL/L (ref 3.5–5.1)
PROT SERPL-MCNC: 8.7 G/DL (ref 6.3–8.2)
RBC # BLD AUTO: 5.03 M/UL (ref 4.63–6.08)
SODIUM SERPL-SCNC: 144 MMOL/L (ref 137–145)
WBC # BLD AUTO: 7.12 K/UL (ref 4.23–9.07)

## 2023-08-21 PROCEDURE — 3074F SYST BP LT 130 MM HG: CPT | Performed by: INTERNAL MEDICINE

## 2023-08-21 PROCEDURE — 99214 OFFICE O/P EST MOD 30 MIN: CPT | Performed by: INTERNAL MEDICINE

## 2023-08-21 PROCEDURE — 80053 COMPREHEN METABOLIC PANEL: CPT

## 2023-08-21 PROCEDURE — 85025 COMPLETE CBC W/AUTO DIFF WBC: CPT

## 2023-08-21 PROCEDURE — 36415 COLL VENOUS BLD VENIPUNCTURE: CPT

## 2023-08-21 PROCEDURE — 3079F DIAST BP 80-89 MM HG: CPT | Performed by: INTERNAL MEDICINE

## 2023-08-21 PROCEDURE — 99212 OFFICE O/P EST SF 10 MIN: CPT

## 2024-02-21 ENCOUNTER — HOSPITAL ENCOUNTER (OUTPATIENT)
Dept: CT IMAGING | Age: 49
Discharge: HOME | End: 2024-02-21
Payer: COMMERCIAL

## 2024-02-21 DIAGNOSIS — C18.7 ADENOCARCINOMA OF SIGMOID COLON (HCC): ICD-10-CM

## 2024-02-21 PROCEDURE — 71260 CT THORAX DX C+: CPT

## 2024-02-21 PROCEDURE — 74177 CT ABD & PELVIS W/CONTRAST: CPT

## 2024-02-21 PROCEDURE — 6360000004 HC RX CONTRAST MEDICATION: Performed by: INTERNAL MEDICINE

## 2024-02-21 RX ADMIN — IOPAMIDOL 75 ML: 755 INJECTION, SOLUTION INTRAVENOUS at 09:31

## 2024-02-23 ENCOUNTER — TELEPHONE (OUTPATIENT)
Dept: HEMATOLOGY | Age: 49
End: 2024-02-23

## 2024-02-23 NOTE — TELEPHONE ENCOUNTER
Called patient and reminded patient of their appointment on 2/27/2024 and patient confirmed they would be here

## 2024-02-26 NOTE — PROGRESS NOTES
Progress Note      Pt Name: Tayler Yoon  YOB: 1975  MRN: [id number]    Date of evaluation: 2/27/2024  History Obtained From:  patient, electronic medical record    CHIEF COMPLAINT:    Chief Complaint   Patient presents with    Follow-up     Adenocarcinoma of sigmoid colon (HCC)         Current active problems    HISTORY OF PRESENT ILLNESS:    Tayler Yoon is a 48 y.o.  male with a history of stage II high risk colonic adenocarcinoma diagnosed treated with sigmoid resection by Dr. Nagel on 4/8/2022.  He took 6 months of adjuvant capecitabine without any significant side effects.  He is due for follow-up colonoscopy which has been delayed due to his travel but is currently set up for March 2024.  He denies any change in his bowels any bleeding etc.  He denies any abdominal pain.    He does have a history of pulmonary embolism which could have been provoked from prior COVID-19.  He is still on Eliquis 5 twice daily, he travels a lot is on airplanes a lot.  He denies any new dyspnea.    He has essential hypertension which is stable with atenolol 25 daily, lisinopril/HCTZ 20/25 daily.  He takes rosuvastatin 10 mg daily with stable cholesterol.       Diagnosis  Adenocarcinoma, sigmoid colon, April 2022  Well to moderately differentiated  pT4a pN0 cM0, stage II high risk  High risk features: pT4, LVI  IHC MMR (MLH1 and PMS2) Intermediate for MMR; No loss of MSH2 and MSH6 by Immunochemistry  Invitae 84 gene panel-[facility]  Covid-19 Aug 2022  Pulmonary Embolism, 10/11/22     Treatment Summary  4/8/22- Sigmoid colectomy with negative lymph node dissection by Dr. Zahida Nagel  5/13/22- 11/19/22 Completion of adjuvant capecitabine 2 weeks on/1 week off x6-month     Cancer History  Tayler Yoon was first seen by me on 5/4/2022.  He was referred by Dr. Zahida Nagel.  The patient was found to have anemia and further work-up with colonoscopy revealed that he had a large mass in the transverse

## 2024-02-27 ENCOUNTER — HOSPITAL ENCOUNTER (OUTPATIENT)
Dept: INFUSION THERAPY | Age: 49
Discharge: HOME | End: 2024-02-27
Payer: COMMERCIAL

## 2024-02-27 ENCOUNTER — OFFICE VISIT (OUTPATIENT)
Dept: HEMATOLOGY | Age: 49
End: 2024-02-27
Payer: COMMERCIAL

## 2024-02-27 VITALS
HEART RATE: 77 BPM | OXYGEN SATURATION: 94 % | BODY MASS INDEX: 39.49 KG/M2 | HEIGHT: 64 IN | SYSTOLIC BLOOD PRESSURE: 110 MMHG | WEIGHT: 231.3 LBS | DIASTOLIC BLOOD PRESSURE: 88 MMHG | TEMPERATURE: 98.2 F

## 2024-02-27 DIAGNOSIS — Z79.01 CHRONIC ANTICOAGULATION: ICD-10-CM

## 2024-02-27 DIAGNOSIS — Z85.038 PERSONAL HISTORY OF COLON CANCER: ICD-10-CM

## 2024-02-27 DIAGNOSIS — Z86.711 HISTORY OF PULMONARY EMBOLUS (PE): ICD-10-CM

## 2024-02-27 DIAGNOSIS — Z85.038 PERSONAL HISTORY OF COLON CANCER: Primary | ICD-10-CM

## 2024-02-27 LAB
ALBUMIN SERPL-MCNC: 4.8 G/DL (ref 3.5–5.2)
ALP SERPL-CCNC: 90 U/L (ref 40–130)
ALT SERPL-CCNC: 31 U/L (ref 21–72)
ANION GAP SERPL CALCULATED.3IONS-SCNC: 13 MMOL/L (ref 7–19)
AST SERPL-CCNC: 33 U/L (ref 17–59)
BASOPHILS # BLD: 0.07 K/UL (ref 0.01–0.08)
BASOPHILS NFR BLD: 1 % (ref 0.1–1.2)
BILIRUB SERPL-MCNC: 0.5 MG/DL (ref 0.2–1.3)
BUN SERPL-MCNC: 29 MG/DL (ref 9–20)
CALCIUM SERPL-MCNC: 10 MG/DL (ref 8.4–10.2)
CEA SERPL-MCNC: 1.5 NG/ML (ref 0–4.7)
CHLORIDE SERPL-SCNC: 96 MMOL/L (ref 98–111)
CO2 SERPL-SCNC: 30 MMOL/L (ref 22–29)
CREAT SERPL-MCNC: 1 MG/DL (ref 0.6–1.2)
EOSINOPHIL # BLD: 0.22 K/UL (ref 0.04–0.54)
EOSINOPHIL NFR BLD: 3.2 % (ref 0.7–7)
ERYTHROCYTE [DISTWIDTH] IN BLOOD BY AUTOMATED COUNT: 14.8 % (ref 11.6–14.4)
GLOBULIN: 4.2 G/DL
GLUCOSE SERPL-MCNC: 200 MG/DL (ref 74–106)
HCT VFR BLD AUTO: 46.8 % (ref 40.1–51)
HGB BLD-MCNC: 15.1 G/DL (ref 13.7–17.5)
LYMPHOCYTES # BLD: 1.24 K/UL (ref 1.18–3.74)
LYMPHOCYTES NFR BLD: 17.8 % (ref 19.3–53.1)
MCH RBC QN AUTO: 28.9 PG (ref 25.7–32.2)
MCHC RBC AUTO-ENTMCNC: 32.3 G/DL (ref 32.3–36.5)
MCV RBC AUTO: 89.5 FL (ref 79–92.2)
MONOCYTES # BLD: 0.54 K/UL (ref 0.24–0.82)
MONOCYTES NFR BLD: 7.7 % (ref 4.7–12.5)
NEUTROPHILS # BLD: 4.88 K/UL (ref 1.56–6.13)
NEUTS SEG NFR BLD: 69.9 % (ref 34–71.1)
PLATELET # BLD AUTO: 226 K/UL (ref 163–337)
PMV BLD AUTO: 11 FL (ref 7.4–10.4)
POTASSIUM SERPL-SCNC: 4.2 MMOL/L (ref 3.5–5.1)
PROT SERPL-MCNC: 8.2 G/DL (ref 6.3–8.2)
RBC # BLD AUTO: 5.23 M/UL (ref 4.63–6.08)
SODIUM SERPL-SCNC: 139 MMOL/L (ref 137–145)
WBC # BLD AUTO: 6.98 K/UL (ref 4.23–9.07)

## 2024-02-27 PROCEDURE — 85025 COMPLETE CBC W/AUTO DIFF WBC: CPT

## 2024-02-27 PROCEDURE — 3079F DIAST BP 80-89 MM HG: CPT | Performed by: PHYSICIAN ASSISTANT

## 2024-02-27 PROCEDURE — 80053 COMPREHEN METABOLIC PANEL: CPT

## 2024-02-27 PROCEDURE — 99212 OFFICE O/P EST SF 10 MIN: CPT

## 2024-02-27 PROCEDURE — 99213 OFFICE O/P EST LOW 20 MIN: CPT | Performed by: PHYSICIAN ASSISTANT

## 2024-02-27 PROCEDURE — 3074F SYST BP LT 130 MM HG: CPT | Performed by: PHYSICIAN ASSISTANT

## 2024-02-27 PROCEDURE — 36415 COLL VENOUS BLD VENIPUNCTURE: CPT

## 2024-02-27 ASSESSMENT — ENCOUNTER SYMPTOMS
SHORTNESS OF BREATH: 0
COUGH: 0
TROUBLE SWALLOWING: 0
CONSTIPATION: 0
DIARRHEA: 0
EYE DISCHARGE: 0
WHEEZING: 0
COLOR CHANGE: 0
BACK PAIN: 1
VOICE CHANGE: 0
ABDOMINAL PAIN: 0
VOMITING: 0
EYE ITCHING: 0
NAUSEA: 0
PHOTOPHOBIA: 0
BLOOD IN STOOL: 0
SORE THROAT: 0
ABDOMINAL DISTENTION: 0

## 2024-08-23 ENCOUNTER — TELEPHONE (OUTPATIENT)
Dept: HEMATOLOGY | Age: 49
End: 2024-08-23

## 2024-08-23 NOTE — TELEPHONE ENCOUNTER
Called patient today for their upcoming appointment on 08/27/2024 and patient needed to be rescheduled. Patient's name and number was taken and given to  staff ( sent to  via teams    )  so that the patient could be rescheduled to a better date & time for the patient. We have now moved to the [facility] that is located between our old office and the ER at the [facility]

## 2024-08-23 NOTE — TELEPHONE ENCOUNTER
Had to call and r/s his appt he didn't have his scans done so r/s his appt until Oct he had already had his scans r/s.

## 2024-08-29 ENCOUNTER — HOSPITAL ENCOUNTER (OUTPATIENT)
Dept: CT IMAGING | Age: 49
Discharge: HOME | End: 2024-08-29
Payer: COMMERCIAL

## 2024-08-29 DIAGNOSIS — Z85.038 PERSONAL HISTORY OF COLON CANCER: ICD-10-CM

## 2024-08-29 PROCEDURE — 71260 CT THORAX DX C+: CPT

## 2024-08-29 PROCEDURE — 74177 CT ABD & PELVIS W/CONTRAST: CPT

## 2024-08-29 PROCEDURE — 6360000004 HC RX CONTRAST MEDICATION: Performed by: PHYSICIAN ASSISTANT

## 2024-08-29 RX ORDER — IOPAMIDOL 755 MG/ML
75 INJECTION, SOLUTION INTRAVASCULAR
Status: COMPLETED | OUTPATIENT
Start: 2024-08-29 | End: 2024-08-29

## 2024-08-29 RX ADMIN — IOPAMIDOL 75 ML: 755 INJECTION, SOLUTION INTRAVENOUS at 09:57

## 2024-10-07 ENCOUNTER — TELEPHONE (OUTPATIENT)
Dept: HEMATOLOGY | Age: 49
End: 2024-10-07

## 2024-10-07 NOTE — TELEPHONE ENCOUNTER
I called patient and reminded patient of their appt on 10/08/24 and patient confirmed they would be here. I also let patient know that we have moved into our new cancer facility and asked patient if they were aware of where we were now located, and patient voiced understanding of our new location. Patient knows not to arrive early and that we will get labs at the time of the follow up appointment and not the lab appointment time.

## 2024-10-07 NOTE — PROGRESS NOTES
malignancy.  5/4/2022-he was first seen by me.  Essentially, stage II high risk pT4, LVI.  IHC MMR was not performed and was ordered today.  5/15/2022-anticipated adjuvant capecitabine x6-month  6/30/2022 CEA 1.2  7/30/2022 Invitae 84 Genetic Panel:VUS.  No pathogenic mutation.  Oct 2022- Hospitalized with Pulmonary embolism. Lovenox x 3 weeks to be followe by Eliqu.   10/11/2022 CT Chest W Contrast Bilateral large pulmonary embolus in both pulmonary arteries. This is amenable for thrombolytic therapy which can be performed by me.  10/11/2022 CT Abd/Pelvis W IV Contrast (oral) No acute pathology seen in abdomen or pelvis. Unchanged from prior CT.  10/13/22 2D Echocardiogram  Left ventricle had normal chamber size and thickness Preserved systolic function with estimated ejection fraction of 60% No regional wall motion abnormality Normal diastolic function No significant valvular lesion seen.  10/13/2022-US bilateral lower extremity showed no evidence of acute DVT  10/14/2022-hospitalized at [facility] with pulmonary embolism.  Incidental finding on surveillance CT chest.  He was started on IV heparin.  Vascular consulted but recommended no thrombolytic.  To be echo showed normal EF.  No RV dilatation.  Patient was discharged on Lovenox with transition to Eliquis after 3 weeks.  Thrombophilia work-up will be performed in clinic.  11/7/22 JAK2 V167F Mutation: Negative  11/7/22 Thrombotic Panel: Antithrombin Activity 110, Protein C 243,Protein S 198, PTD 16.4,Thrombin Time 19.7, Lupus anticoagulant not detected, prothrombin gene mutation negative.  Anticardiolipin and beta-2 glycoprotein normal. APC 2.68-Factor V leiden not recommended  11/7/22 CEA 1.7  11/19/22 Completion of adjuvant capecitabine 2 weeks on/1 week off x6-month  3/6/23 CEA 1.5  3/13/23 MM Labs: Kappa 29.90, Lambda 24.30, Kenvir/Lambda 1.23, IgA 305, IgG 1621, IgM 56, M-Naresh:Normal SPEP  3/13/23 Hepatitis Panel: No- Reactive  3/13/23 Labs:

## 2024-10-08 ENCOUNTER — HOSPITAL ENCOUNTER (OUTPATIENT)
Dept: INFUSION THERAPY | Age: 49
Discharge: HOME | End: 2024-10-08
Payer: COMMERCIAL

## 2024-10-08 ENCOUNTER — OFFICE VISIT (OUTPATIENT)
Dept: HEMATOLOGY | Age: 49
End: 2024-10-08
Payer: COMMERCIAL

## 2024-10-08 VITALS
BODY MASS INDEX: 41 KG/M2 | HEIGHT: 63 IN | WEIGHT: 231.4 LBS | SYSTOLIC BLOOD PRESSURE: 140 MMHG | DIASTOLIC BLOOD PRESSURE: 62 MMHG | TEMPERATURE: 97.7 F | OXYGEN SATURATION: 97 % | HEART RATE: 71 BPM

## 2024-10-08 DIAGNOSIS — R77.8 ELEVATED TOTAL PROTEIN: ICD-10-CM

## 2024-10-08 DIAGNOSIS — Z85.038 PERSONAL HISTORY OF COLON CANCER: ICD-10-CM

## 2024-10-08 DIAGNOSIS — Z79.01 CHRONIC ANTICOAGULATION: ICD-10-CM

## 2024-10-08 DIAGNOSIS — Z85.038 PERSONAL HISTORY OF COLON CANCER: Primary | ICD-10-CM

## 2024-10-08 DIAGNOSIS — Z86.711 HISTORY OF PULMONARY EMBOLUS (PE): ICD-10-CM

## 2024-10-08 LAB
ALBUMIN SERPL-MCNC: 4.7 G/DL (ref 3.5–5.2)
ALP SERPL-CCNC: 100 U/L (ref 40–129)
ALT SERPL-CCNC: 43 U/L (ref 5–41)
ANION GAP SERPL CALCULATED.3IONS-SCNC: 14 MMOL/L (ref 7–19)
AST SERPL-CCNC: 31 U/L (ref 5–40)
BASOPHILS # BLD: 0.08 K/UL (ref 0.01–0.08)
BASOPHILS NFR BLD: 1.3 % (ref 0.1–1.2)
BILIRUB SERPL-MCNC: 0.6 MG/DL (ref 0.2–1.2)
BUN SERPL-MCNC: 19 MG/DL (ref 6–20)
CALCIUM SERPL-MCNC: 10.3 MG/DL (ref 8.6–10)
CEA SERPL-MCNC: 1.4 NG/ML (ref 0–4.7)
CHLORIDE SERPL-SCNC: 92 MMOL/L (ref 98–111)
CO2 SERPL-SCNC: 33 MMOL/L (ref 22–29)
CREAT SERPL-MCNC: 0.8 MG/DL (ref 0.7–1.2)
EOSINOPHIL # BLD: 0.16 K/UL (ref 0.04–0.54)
EOSINOPHIL NFR BLD: 2.6 % (ref 0.7–7)
ERYTHROCYTE [DISTWIDTH] IN BLOOD BY AUTOMATED COUNT: 13.9 % (ref 11.6–14.4)
GLUCOSE SERPL-MCNC: 134 MG/DL (ref 70–99)
HCT VFR BLD AUTO: 49.2 % (ref 40.1–51)
HGB BLD-MCNC: 16.1 G/DL (ref 13.7–17.5)
LYMPHOCYTES # BLD: 1.35 K/UL (ref 1.18–3.74)
LYMPHOCYTES NFR BLD: 21.7 % (ref 19.3–53.1)
MCH RBC QN AUTO: 29.7 PG (ref 25.7–32.2)
MCHC RBC AUTO-ENTMCNC: 32.7 G/DL (ref 32.3–36.5)
MCV RBC AUTO: 90.8 FL (ref 79–92.2)
MONOCYTES # BLD: 0.6 K/UL (ref 0.24–0.82)
MONOCYTES NFR BLD: 9.6 % (ref 4.7–12.5)
NEUTROPHILS # BLD: 4.01 K/UL (ref 1.56–6.13)
NEUTS SEG NFR BLD: 64.3 % (ref 34–71.1)
PLATELET # BLD AUTO: 251 K/UL (ref 163–337)
PMV BLD AUTO: 10.3 FL (ref 7.4–10.4)
POTASSIUM SERPL-SCNC: 4 MMOL/L (ref 3.5–5)
PROT SERPL-MCNC: 8.9 G/DL (ref 6.4–8.3)
RBC # BLD AUTO: 5.42 M/UL (ref 4.63–6.08)
SODIUM SERPL-SCNC: 139 MMOL/L (ref 136–145)
WBC # BLD AUTO: 6.23 K/UL (ref 4.23–9.07)

## 2024-10-08 PROCEDURE — 85025 COMPLETE CBC W/AUTO DIFF WBC: CPT

## 2024-10-08 PROCEDURE — 99213 OFFICE O/P EST LOW 20 MIN: CPT

## 2024-10-08 PROCEDURE — 36415 COLL VENOUS BLD VENIPUNCTURE: CPT

## 2024-10-08 RX ORDER — MEDROXYPROGESTERONE ACETATE 150 MG/ML
1 INJECTION, SUSPENSION INTRAMUSCULAR WEEKLY
COMMUNITY

## 2024-10-08 ASSESSMENT — ENCOUNTER SYMPTOMS
EYE ITCHING: 0
COUGH: 0
ABDOMINAL DISTENTION: 0
CONSTIPATION: 0
WHEEZING: 0
SORE THROAT: 0
BLOOD IN STOOL: 0
SHORTNESS OF BREATH: 0
PHOTOPHOBIA: 0
NAUSEA: 0
VOICE CHANGE: 0
ABDOMINAL PAIN: 0
BACK PAIN: 1
TROUBLE SWALLOWING: 0
VOMITING: 0
EYE DISCHARGE: 0
DIARRHEA: 0
COLOR CHANGE: 0

## 2024-10-11 LAB
ALBUMIN SERPL-MCNC: 4.65 G/DL (ref 3.75–5.01)
ALPHA1 GLOB SERPL ELPH-MCNC: 0.31 G/DL (ref 0.19–0.46)
ALPHA2 GLOB SERPL ELPH-MCNC: 0.88 G/DL (ref 0.48–1.05)
B-GLOBULIN SERPL ELPH-MCNC: 1.28 G/DL (ref 0.48–1.1)
DEPRECATED KAPPA LC FREE/LAMBDA SER: 1.34 {RATIO} (ref 0.26–1.65)
EER MONOCLONAL PROTEIN AND FLC, SERUM: ABNORMAL
GAMMA GLOB SERPL ELPH-MCNC: 1.38 G/DL (ref 0.62–1.51)
IGA SERPL-MCNC: 297 MG/DL (ref 68–408)
IGG SERPL-MCNC: 1560 MG/DL (ref 768–1632)
IGM SERPL-MCNC: 59 MG/DL (ref 35–263)
INTERPRETATION SERPL IFE-IMP: ABNORMAL
INTERPRETATION SERPL IFE-IMP: ABNORMAL
KAPPA LC FREE SER-MCNC: 29 MG/L (ref 3.3–19.4)
LAMBDA LC FREE SERPL-MCNC: 21.6 MG/L (ref 5.71–26.3)
MONOCLONAL PROTEIN, SERUM: ABNORMAL G/DL
PROT SERPL-MCNC: 8.5 G/DL (ref 6.3–8.2)

## 2025-02-27 ENCOUNTER — OFFICE VISIT (OUTPATIENT)
Dept: GASTROENTEROLOGY | Facility: CLINIC | Age: 50
End: 2025-02-27
Payer: COMMERCIAL

## 2025-02-27 VITALS
DIASTOLIC BLOOD PRESSURE: 72 MMHG | SYSTOLIC BLOOD PRESSURE: 122 MMHG | HEIGHT: 64 IN | OXYGEN SATURATION: 95 % | WEIGHT: 232 LBS | BODY MASS INDEX: 39.61 KG/M2 | HEART RATE: 81 BPM | TEMPERATURE: 97.3 F

## 2025-02-27 DIAGNOSIS — Z80.0 FH: COLON CANCER: ICD-10-CM

## 2025-02-27 DIAGNOSIS — Z85.038 PERSONAL HISTORY OF COLON CANCER, STAGE II: Primary | ICD-10-CM

## 2025-02-27 DIAGNOSIS — Z83.719 FH: COLON POLYPS: ICD-10-CM

## 2025-02-27 DIAGNOSIS — Z79.01 ANTICOAGULATED: ICD-10-CM

## 2025-02-27 RX ORDER — APIXABAN 5 MG/1
1 TABLET, FILM COATED ORAL 2 TIMES DAILY
COMMUNITY
Start: 2024-10-08

## 2025-02-27 RX ORDER — SOD SULF/POT CHLORIDE/MAG SULF 1.479 G
12 TABLET ORAL TAKE AS DIRECTED
Qty: 24 TABLET | Refills: 0 | Status: SHIPPED | OUTPATIENT
Start: 2025-02-27

## 2025-02-27 NOTE — PROGRESS NOTES
Primary Physician: Cristian Burciaga MD    Chief Complaint   Patient presents with    Colon Cancer Screening     Pt presents today for evaluation for colonoscopy-pt's last colon was 3/18/2022; Personal history of colon cancer; Family history of colon cancer and colon polyps       Subjective     Alicia Macedo is a 49 y.o. male.    HPI  History of colon cancer  Patient diagnosed with adenocarcinoma of the colon in 2022.  She was having her first colonoscopy in March 2022 for evaluation of iron deficiency anemia at which time she had a near obstructing colon mass identified.  April 2022 patient underwent left colon resection by Dr. Noreen Aragon    Patient failed to respond to our attempts to set up recheck colonoscopy for the 1 year post resection surveillance.      At this time, patient denies any change in bowel habits.  No diarrhea, constipation, abdominal pain or rectal bleeding to report.  10/8/2024 H&H 16.1/49.2 and the CEA was normal at 1.4.    Brother has a history of colon polyps age less than 60      Family history of colon cancer  Aunt had colon cancer in her 40s, uncle colon cancer in his 60s.        Past Medical History:   Diagnosis Date    Ankylosing spondylitis     Anxiety     Diabetes mellitus     Family history of colon cancer     Family history of colonic polyps     GERD (gastroesophageal reflux disease)     Gout     History of colon cancer     Hyperlipidemia     Hypertension     Kidney stone     Osteoarthritis     Pulmonary embolism     Sleep apnea        Past Surgical History:   Procedure Laterality Date    CHOLECYSTECTOMY      Dr. Aragon    COLON RESECTION N/A 04/08/2022    Procedure: HAND ASSISTED LAPAROSCOPIC SIGMOID COLECTOMY;  Surgeon: Noreen Aragon MD;  Location: [facility];  Service: General;  Laterality: N/A;    COLONOSCOPY N/A 03/18/2022    Likely malignant partially obstructing tumor in the transverse colon-biopsied-Tattooed; Repeat colonoscopy (date not yet determined) because  the examination was incomplete    ENDOSCOPY N/A 03/18/2022    Normal esophagus; Non-erosive gastritis-biopsied; Normal examined duodenum-biopsied        Current Outpatient Medications:     allopurinol (ZYLOPRIM) 300 MG tablet, Take 1 tablet by mouth Daily., Disp: , Rfl:     atenolol (TENORMIN) 25 MG tablet, Take 1 tablet by mouth Daily., Disp: , Rfl:     dapagliflozin-metformin HCl ER (XIGDUO XR)  MG tablet, Take 1 tablet by mouth Daily., Disp: , Rfl:     Eliquis 5 MG tablet tablet, Take 1 tablet by mouth 2 (Two) Times a Day., Disp: , Rfl:     Enbrel SureClick 50 MG/ML solution auto-injector, Inject 1 mL under the skin into the appropriate area as directed 1 (One) Time Per Week., Disp: , Rfl:     ferrous sulfate 325 (65 FE) MG tablet, Take 1 tablet by mouth Daily., Disp: , Rfl:     folic acid (FOLVITE) 1 MG tablet, Take 1 tablet by mouth Daily., Disp: , Rfl:     lisinopril-hydrochlorothiazide (PRINZIDE,ZESTORETIC) 20-25 MG per tablet, Take 1 tablet by mouth Daily., Disp: , Rfl:     predniSONE (DELTASONE) 5 MG tablet, Take 1 tablet by mouth Daily., Disp: , Rfl:     Sodium Sulfate-Mag Sulfate-KCl (Sutab) 0535-291-896 MG tablet, Take 12 tablets by mouth Take As Directed., Disp: 24 tablet, Rfl: 0    No Known Allergies    Social History     Socioeconomic History    Marital status:    Tobacco Use    Smoking status: Never    Smokeless tobacco: Never   Vaping Use    Vaping status: Never Used   Substance and Sexual Activity    Alcohol use: Never    Drug use: Never    Sexual activity: Yes     Partners: Female     Birth control/protection: None       Family History   Problem Relation Age of Onset    Cirrhosis Father 47    Alcohol abuse Father     Colon polyps Brother         < 60 years of age    Colon cancer Maternal Aunt         In her 40's    Alcohol abuse Paternal Aunt     Colon cancer Paternal Uncle         In his 60's    Alcohol abuse Paternal Uncle     Alcohol abuse Maternal Grandfather     Esophageal  "cancer Neg Hx     Liver cancer Neg Hx     Liver disease Neg Hx     Rectal cancer Neg Hx     Stomach cancer Neg Hx        Review of Systems   Constitutional:  Negative for unexpected weight change.   Respiratory:  Negative for shortness of breath.        Objective     /72 (BP Location: Left arm, Patient Position: Sitting, Cuff Size: Adult)   Pulse 81   Temp 97.3 °F (36.3 °C) (Infrared)   Ht 162.6 cm (64\")   Wt 105 kg (232 lb)   SpO2 95%   BMI 39.82 kg/m²     Physical Exam  Vitals reviewed.   Constitutional:       Appearance: Normal appearance.   Cardiovascular:      Rate and Rhythm: Normal rate and regular rhythm.      Heart sounds: Normal heart sounds.   Pulmonary:      Effort: Pulmonary effort is normal.      Breath sounds: Normal breath sounds.   Neurological:      Mental Status: He is alert.         Lab Results - Last 18 Months   Lab Units 10/08/24  0826   GLUCOSE mg/dL 134*   BUN mg/dL 19   CREATININE mg/dL 0.8   SODIUM mmol/L 139   POTASSIUM mmol/L 4.0   CHLORIDE mmol/L 92*   TOTAL CO2 mmol/L 33*   TOTAL PROTEIN g/dL 8.9*   ALBUMIN g/dL 4.7   ALT (SGPT) U/L 43*   AST (SGOT) U/L 31   ALK PHOS U/L 100   BILIRUBIN mg/dL 0.6       Lab Results - Last 18 Months   Lab Units 10/08/24  0835 02/27/24  0819   HEMOGLOBIN g/dL 16.1 15.1   HEMATOCRIT % 49.2 46.8   MCV fL 90.8 89.5   WBC K/uL 6.23 6.98   RDW % 13.9 14.8*   MPV fL 10.3 11.0*   PLATELETS K/uL 251 226           IMPRESSION/PLAN:    Assessment & Plan      Problem List Items Addressed This Visit       Personal history of colon cancer, stage II - Primary    Overview     March 2022 colonoscopy for iron deficiency anemia revealed near obstructing colon mass confirmed with pathology to be adenocarcinoma.  April 2022 underwent left colon resection by Dr. Noreen Aragon.  Following that patient failed to respond to our attempts to reschedule follow-up surveillance colonoscopy.         Relevant Medications    Sodium Sulfate-Mag Sulfate-KCl (Sutab) 7175-426-560 " MG tablet    Other Relevant Orders    Case Request (Completed)    Follow Anesthesia Guidelines / Protocol    FH: colon cancer    Overview     Aunt colon cancer in her 40's. Uncle colon cancer in his 60's.         FH: colon polyps    Overview     Brother < 60         Anticoagulated    Overview     Eliquis daily secondary to Hx PE          Colonoscopy per Dr. Wesley Romero per pt request          ..The risks, benefits, and alternatives of colonoscopy were reviewed with the patient today.  Risks including perforation of the colon possibly requiring surgery or colostomy.  Additional risks include risk of bleeding from biopsies or removal of colon tissue.  There is also the risk of a drug reaction or problems with anesthesia.  This will be discussed with the further by the anesthesia team on the day of the procedure.  Lastly there is a possibility of missing a colon polyp or cancer.  The benefits include the diagnosis and management of disease of the colon and rectum.  Alternatives to colonoscopy include barium enema, laboratory testing, radiographic evaluation, or no intervention.  The patient verbalizes understanding and agrees.    In accordance with requirements under the Affordable Care Act, [facility] has provided pricing for all hospital services and items on each of its websites. However, a patient's actual cost may differ based on the services the patient receives to meet individual healthcare needs and based on the benefits provided under the patient’s insurance coverage.        Blanka Ball, APRN  02/27/25  13:38 CST    Part of this note may be an electronic transcription/translation of spoken language to printed text.

## 2025-04-03 ENCOUNTER — HOSPITAL ENCOUNTER (OUTPATIENT)
Dept: CT IMAGING | Age: 50
Discharge: HOME | End: 2025-04-03
Payer: COMMERCIAL

## 2025-04-03 DIAGNOSIS — Z85.038 PERSONAL HISTORY OF COLON CANCER: ICD-10-CM

## 2025-04-03 PROCEDURE — 71260 CT THORAX DX C+: CPT

## 2025-04-03 PROCEDURE — 6360000004 HC RX CONTRAST MEDICATION: Performed by: PHYSICIAN ASSISTANT

## 2025-04-03 PROCEDURE — 74177 CT ABD & PELVIS W/CONTRAST: CPT

## 2025-04-03 RX ORDER — IOPAMIDOL 755 MG/ML
75 INJECTION, SOLUTION INTRAVASCULAR
Status: COMPLETED | OUTPATIENT
Start: 2025-04-03 | End: 2025-04-03

## 2025-04-03 RX ADMIN — IOPAMIDOL 75 ML: 755 INJECTION, SOLUTION INTRAVENOUS at 09:23

## 2025-04-07 ENCOUNTER — TELEPHONE (OUTPATIENT)
Dept: HEMATOLOGY | Age: 50
End: 2025-04-07

## 2025-04-07 NOTE — TELEPHONE ENCOUNTER
Called Patient and reminded patient of their appointment on 04/14/2025 and patient confirmed they would be here. Reminded patient to just come at appointment time, and to not come at the lab appointment time. Reminded patient that we will not check them in any more than 30 minutes before appointment time.  We have now moved to the [facility] [place] that is located between our old office and the ER at the [facility]. Letting the Pt know that our front entrance faces the  [employer] [place]. Reminded pt to eat well and be well hydrated for their labs.

## 2025-04-11 DIAGNOSIS — R77.8 ELEVATED TOTAL PROTEIN: ICD-10-CM

## 2025-04-11 DIAGNOSIS — Z85.038 PERSONAL HISTORY OF COLON CANCER: Primary | ICD-10-CM

## 2025-04-11 NOTE — PROGRESS NOTES
Progress Note      Pt Name: Tayler Yoon  YOB: 1975  MRN: [id number]    Date of evaluation: 4/14/2025  History Obtained From:  patient, electronic medical record    CHIEF COMPLAINT:    Chief Complaint   Patient presents with    Follow-up     Personal history of colon cancer  Patient did not have any new issues to discuss      Current active problems  Resected stage II sigmoid adenocarcinoma    Tayler Yoon is a 49 y.o.  male with a history of stage II high risk colonic adenocarcinoma treated with sigmoid resection by Dr. Nagel on 4/8/2022.  He took 6 months of adjuvant capecitabine without any significant side effects.  He is now on intermittent surveillance-getting imaging every 6 months.  He denies any new abdominal complaints.  He has follow-up set up with Dr. Ryan for colonoscopy later this month.      He does have a history of pulmonary embolism which could have been provoked from prior COVID-19.  He is on Eliquis 5 twice daily-he travels on airplanes a lot and is in a compromised position.  He denies any bleeding issues.      He continues to have issues with his right hip-awaiting potential replacement in the future through the orthopedic [facility].      He has a longstanding history of ankylosing spondylitis on chronic prednisone 5 daily followed by Dr. Villarreal, also uses Enbrel.    He has essential hypertension which is stable with atenolol 25 daily, lisinopril/HCTZ 20/25 daily.  He takes rosuvastatin 10 mg daily with stable cholesterol.       Diagnosis  Adenocarcinoma, sigmoid colon, April 2022  Well to moderately differentiated  pT4a pN0 cM0, stage II high risk  High risk features: pT4, LVI  IHC MMR (MLH1 and PMS2) Intermediate for MMR; No loss of MSH2 and MSH6 by Immunochemistry  [employer] 84 gene panel-VUS MuTHY  Covid-19 Aug 2022  Pulmonary Embolism, 10/11/22     Treatment Summary  4/8/22- Sigmoid colectomy with negative lymph node dissection by Dr. Zahida Nagel  5/13/22-

## 2025-04-14 ENCOUNTER — OFFICE VISIT (OUTPATIENT)
Dept: HEMATOLOGY | Age: 50
End: 2025-04-14
Payer: COMMERCIAL

## 2025-04-14 ENCOUNTER — HOSPITAL ENCOUNTER (OUTPATIENT)
Dept: INFUSION THERAPY | Age: 50
Discharge: HOME | End: 2025-04-14
Payer: COMMERCIAL

## 2025-04-14 VITALS
TEMPERATURE: 98.5 F | SYSTOLIC BLOOD PRESSURE: 126 MMHG | DIASTOLIC BLOOD PRESSURE: 78 MMHG | OXYGEN SATURATION: 96 % | WEIGHT: 228.7 LBS | HEART RATE: 75 BPM | BODY MASS INDEX: 40.52 KG/M2 | HEIGHT: 63 IN

## 2025-04-14 DIAGNOSIS — Z86.711 HISTORY OF PULMONARY EMBOLUS (PE): ICD-10-CM

## 2025-04-14 DIAGNOSIS — R77.8 ELEVATED TOTAL PROTEIN: ICD-10-CM

## 2025-04-14 DIAGNOSIS — Z85.038 PERSONAL HISTORY OF COLON CANCER: ICD-10-CM

## 2025-04-14 DIAGNOSIS — D75.1 ERYTHROCYTOSIS: ICD-10-CM

## 2025-04-14 DIAGNOSIS — Z85.038 PERSONAL HISTORY OF COLON CANCER: Primary | ICD-10-CM

## 2025-04-14 DIAGNOSIS — Z79.01 CHRONIC ANTICOAGULATION: ICD-10-CM

## 2025-04-14 DIAGNOSIS — K76.0 HEPATIC STEATOSIS: ICD-10-CM

## 2025-04-14 LAB
ALBUMIN SERPL-MCNC: 4.7 G/DL (ref 3.5–5.2)
ALP SERPL-CCNC: 95 U/L (ref 40–129)
ALT SERPL-CCNC: 75 U/L (ref 5–41)
ANION GAP SERPL CALCULATED.3IONS-SCNC: 13 MMOL/L (ref 7–19)
AST SERPL-CCNC: 69 U/L (ref 5–40)
BASOPHILS # BLD: 0.05 K/UL (ref 0–0.2)
BASOPHILS NFR BLD: 0.7 % (ref 0–1)
BILIRUB SERPL-MCNC: 0.7 MG/DL (ref 0–1.2)
BUN SERPL-MCNC: 21 MG/DL (ref 6–20)
CALCIUM SERPL-MCNC: 9.8 MG/DL (ref 8.6–10)
CEA SERPL-MCNC: 1.7 NG/ML (ref 0–4.7)
CHLORIDE SERPL-SCNC: 91 MMOL/L (ref 98–107)
CO2 SERPL-SCNC: 32 MMOL/L (ref 22–29)
CREAT SERPL-MCNC: 1 MG/DL (ref 0.7–1.2)
EOSINOPHIL # BLD: 0.13 K/UL (ref 0–0.6)
EOSINOPHIL NFR BLD: 1.9 % (ref 0–5)
ERYTHROCYTE [DISTWIDTH] IN BLOOD BY AUTOMATED COUNT: 14.3 % (ref 11.5–14.5)
GLUCOSE SERPL-MCNC: 189 MG/DL (ref 70–99)
HCT VFR BLD AUTO: 53.1 % (ref 42–52)
HGB BLD-MCNC: 17.8 G/DL (ref 14–18)
LYMPHOCYTES # BLD: 1.21 K/UL (ref 1.1–4.5)
LYMPHOCYTES NFR BLD: 17.7 % (ref 20–40)
MCH RBC QN AUTO: 31.1 PG (ref 27–31)
MCHC RBC AUTO-ENTMCNC: 33.5 G/DL (ref 33–37)
MCV RBC AUTO: 92.7 FL (ref 80–94)
MONOCYTES # BLD: 0.55 K/UL (ref 0–0.9)
MONOCYTES NFR BLD: 8 % (ref 1–10)
NEUTROPHILS # BLD: 4.88 K/UL (ref 1.5–7.5)
NEUTS SEG NFR BLD: 71.4 % (ref 50–65)
PLATELET # BLD AUTO: 206 K/UL (ref 130–400)
PMV BLD AUTO: 11 FL (ref 9.4–12.4)
POTASSIUM SERPL-SCNC: 4.8 MMOL/L (ref 3.5–5.1)
PROT SERPL-MCNC: 8.5 G/DL (ref 6.4–8.3)
RBC # BLD AUTO: 5.73 M/UL (ref 4.7–6.1)
SODIUM SERPL-SCNC: 136 MMOL/L (ref 136–145)
WBC # BLD AUTO: 6.84 K/UL (ref 4.8–10.8)

## 2025-04-14 PROCEDURE — 82378 CARCINOEMBRYONIC ANTIGEN: CPT

## 2025-04-14 PROCEDURE — 80053 COMPREHEN METABOLIC PANEL: CPT

## 2025-04-14 PROCEDURE — 3074F SYST BP LT 130 MM HG: CPT | Performed by: PHYSICIAN ASSISTANT

## 2025-04-14 PROCEDURE — 99214 OFFICE O/P EST MOD 30 MIN: CPT | Performed by: PHYSICIAN ASSISTANT

## 2025-04-14 PROCEDURE — 36415 COLL VENOUS BLD VENIPUNCTURE: CPT

## 2025-04-14 PROCEDURE — 84155 ASSAY OF PROTEIN SERUM: CPT

## 2025-04-14 PROCEDURE — 86334 IMMUNOFIX E-PHORESIS SERUM: CPT

## 2025-04-14 PROCEDURE — 83883 ASSAY NEPHELOMETRY NOT SPEC: CPT

## 2025-04-14 PROCEDURE — 3078F DIAST BP <80 MM HG: CPT | Performed by: PHYSICIAN ASSISTANT

## 2025-04-14 PROCEDURE — 99213 OFFICE O/P EST LOW 20 MIN: CPT

## 2025-04-14 PROCEDURE — 84165 PROTEIN E-PHORESIS SERUM: CPT

## 2025-04-14 PROCEDURE — 82784 ASSAY IGA/IGD/IGG/IGM EACH: CPT

## 2025-04-14 PROCEDURE — 85025 COMPLETE CBC W/AUTO DIFF WBC: CPT

## 2025-04-14 PROCEDURE — 83521 IG LIGHT CHAINS FREE EACH: CPT

## 2025-04-14 RX ORDER — TIRZEPATIDE 2.5 MG/.5ML
INJECTION, SOLUTION SUBCUTANEOUS
COMMUNITY

## 2025-04-14 RX ORDER — ROSUVASTATIN CALCIUM 10 MG/1
TABLET, COATED ORAL
COMMUNITY
Start: 2025-03-07

## 2025-04-14 ASSESSMENT — ENCOUNTER SYMPTOMS
SHORTNESS OF BREATH: 0
VOICE CHANGE: 0
BACK PAIN: 1
WHEEZING: 0
EYE DISCHARGE: 0
EYE ITCHING: 0
COUGH: 0
CONSTIPATION: 0
ABDOMINAL PAIN: 0
VOMITING: 0
NAUSEA: 0
TROUBLE SWALLOWING: 0
BLOOD IN STOOL: 0
PHOTOPHOBIA: 0
COLOR CHANGE: 0
DIARRHEA: 0
SORE THROAT: 0
ABDOMINAL DISTENTION: 0

## 2025-04-15 ENCOUNTER — TELEPHONE (OUTPATIENT)
Dept: GASTROENTEROLOGY | Facility: CLINIC | Age: 50
End: 2025-04-15
Payer: COMMERCIAL

## 2025-04-15 NOTE — TELEPHONE ENCOUNTER
Received ahsanis clearance from Dr. Burciaga. No lovenox required. Called pt, but no answer. Left vm and advised to call back with any questions.

## 2025-04-17 LAB
ALBUMIN SERPL-MCNC: 4.79 G/DL (ref 3.75–5.01)
ALPHA1 GLOB SERPL ELPH-MCNC: 0.28 G/DL (ref 0.19–0.46)
ALPHA2 GLOB SERPL ELPH-MCNC: 0.81 G/DL (ref 0.48–1.05)
B-GLOBULIN SERPL ELPH-MCNC: 1.27 G/DL (ref 0.48–1.1)
DEPRECATED KAPPA LC FREE/LAMBDA SER: 1.44 {RATIO} (ref 0.26–1.65)
EER MONOCLONAL PROTEIN AND FLC, SERUM: ABNORMAL
GAMMA GLOB SERPL ELPH-MCNC: 1.35 G/DL (ref 0.62–1.51)
IGA SERPL-MCNC: 290 MG/DL (ref 68–408)
IGG SERPL-MCNC: 1533 MG/DL (ref 768–1632)
IGM SERPL-MCNC: 68 MG/DL (ref 35–263)
INTERPRETATION SERPL IFE-IMP: ABNORMAL
INTERPRETATION SERPL IFE-IMP: ABNORMAL
KAPPA LC FREE SER-MCNC: 29.23 MG/L (ref 3.3–19.4)
LAMBDA LC FREE SERPL-MCNC: 20.34 MG/L (ref 5.71–26.3)
MONOCLONAL PROTEIN, SERUM: ABNORMAL G/DL
PROT SERPL-MCNC: 8.5 G/DL (ref 6.3–8.2)

## 2025-04-25 ENCOUNTER — HOSPITAL ENCOUNTER (OUTPATIENT)
Facility: HOSPITAL | Age: 50
Setting detail: HOSPITAL OUTPATIENT SURGERY
Discharge: HOME | End: 2025-04-25
Attending: INTERNAL MEDICINE | Admitting: INTERNAL MEDICINE
Payer: COMMERCIAL

## 2025-04-25 ENCOUNTER — ANESTHESIA (OUTPATIENT)
Dept: GASTROENTEROLOGY | Facility: HOSPITAL | Age: 50
End: 2025-04-25
Payer: COMMERCIAL

## 2025-04-25 ENCOUNTER — ANESTHESIA EVENT (OUTPATIENT)
Dept: GASTROENTEROLOGY | Facility: HOSPITAL | Age: 50
End: 2025-04-25
Payer: COMMERCIAL

## 2025-04-25 VITALS
TEMPERATURE: 96.8 F | HEIGHT: 61 IN | BODY MASS INDEX: 42.29 KG/M2 | SYSTOLIC BLOOD PRESSURE: 121 MMHG | DIASTOLIC BLOOD PRESSURE: 76 MMHG | WEIGHT: 224 LBS | RESPIRATION RATE: 20 BRPM | OXYGEN SATURATION: 94 % | HEART RATE: 100 BPM

## 2025-04-25 DIAGNOSIS — Z85.038 PERSONAL HISTORY OF COLON CANCER, STAGE II: ICD-10-CM

## 2025-04-25 PROCEDURE — 25010000002 PROPOFOL 10 MG/ML EMULSION: Performed by: NURSE ANESTHETIST, CERTIFIED REGISTERED

## 2025-04-25 PROCEDURE — 45378 DIAGNOSTIC COLONOSCOPY: CPT | Performed by: INTERNAL MEDICINE

## 2025-04-25 PROCEDURE — 25810000003 SODIUM CHLORIDE 0.9 % SOLUTION: Performed by: NURSE ANESTHETIST, CERTIFIED REGISTERED

## 2025-04-25 PROCEDURE — 25010000002 LIDOCAINE PF 2% 2 % SOLUTION: Performed by: NURSE ANESTHETIST, CERTIFIED REGISTERED

## 2025-04-25 RX ORDER — SODIUM CHLORIDE 0.9 % (FLUSH) 0.9 %
10 SYRINGE (ML) INJECTION AS NEEDED
Status: DISCONTINUED | OUTPATIENT
Start: 2025-04-25 | End: 2025-04-25 | Stop reason: HOSPADM

## 2025-04-25 RX ORDER — LIDOCAINE HYDROCHLORIDE 10 MG/ML
0.5 INJECTION, SOLUTION EPIDURAL; INFILTRATION; INTRACAUDAL; PERINEURAL ONCE AS NEEDED
Status: DISCONTINUED | OUTPATIENT
Start: 2025-04-25 | End: 2025-04-25 | Stop reason: HOSPADM

## 2025-04-25 RX ORDER — LIDOCAINE HYDROCHLORIDE 20 MG/ML
INJECTION, SOLUTION EPIDURAL; INFILTRATION; INTRACAUDAL; PERINEURAL AS NEEDED
Status: DISCONTINUED | OUTPATIENT
Start: 2025-04-25 | End: 2025-04-25 | Stop reason: SURG

## 2025-04-25 RX ORDER — SODIUM CHLORIDE 9 MG/ML
500 INJECTION, SOLUTION INTRAVENOUS CONTINUOUS PRN
Status: DISCONTINUED | OUTPATIENT
Start: 2025-04-25 | End: 2025-04-25 | Stop reason: HOSPADM

## 2025-04-25 RX ORDER — PROPOFOL 10 MG/ML
VIAL (ML) INTRAVENOUS AS NEEDED
Status: DISCONTINUED | OUTPATIENT
Start: 2025-04-25 | End: 2025-04-25 | Stop reason: SURG

## 2025-04-25 RX ADMIN — LIDOCAINE HYDROCHLORIDE 100 MG: 20 INJECTION, SOLUTION EPIDURAL; INFILTRATION; INTRACAUDAL; PERINEURAL at 08:01

## 2025-04-25 RX ADMIN — SODIUM CHLORIDE 500 ML: 9 INJECTION, SOLUTION INTRAVENOUS at 07:25

## 2025-04-25 RX ADMIN — PROPOFOL 375 MG: 10 INJECTION, EMULSION INTRAVENOUS at 08:01

## 2025-04-25 NOTE — ANESTHESIA PREPROCEDURE EVALUATION
Anesthesia Evaluation     history of anesthetic complications:  difficult airway  NPO Solid Status: > 8 hours  NPO Liquid Status: > 2 hours           Airway   Mallampati: IV  Difficult intubation highly probable  Dental      Pulmonary    (+) pulmonary embolism,sleep apnea on CPAP  Cardiovascular   Exercise tolerance: good (4-7 METS)    (+) hypertension, hyperlipidemia  (-) CAD      Neuro/Psych  (+) psychiatric history Anxiety  (-) seizures, TIA, CVA  GI/Hepatic/Renal/Endo    (+) morbid obesity, GERD, renal disease- stones, diabetes mellitus type 2  (-) liver disease    Musculoskeletal     Abdominal    Substance History      OB/GYN          Other   arthritis, autoimmune disease (ankylosing spondylitis) ,chronic steroid use    history of cancer remission        Phys Exam Other: Previously intubated with cmac and fiberoptic, very difficult intubation              Anesthesia Plan    ASA 3     MAC     intravenous induction     Anesthetic plan, risks, benefits, and alternatives have been provided, discussed and informed consent has been obtained with: patient.      CODE STATUS:

## 2025-04-25 NOTE — ANESTHESIA POSTPROCEDURE EVALUATION
Patient: Alicia Macedo    Procedure Summary       Date: 04/25/25 Room / Location: [facility] ENDOSCOPY 2 / [facility] ENDOSCOPY    Anesthesia Start: 0759 Anesthesia Stop: 0837    Procedure: COLONOSCOPY WITH ANESTHESIA Diagnosis:       Personal history of colon cancer, stage II      (Personal history of colon cancer, stage II [Z85.038])    Surgeons: Deb Olson MD Provider: Lucille Estevez CRNA    Anesthesia Type: MAC ASA Status: 3            Anesthesia Type: MAC    Vitals  Vitals Value Taken Time   /76 04/25/25 09:25   Temp     Pulse 100 04/25/25 09:25   Resp 20 04/25/25 09:25   SpO2 94 % 04/25/25 09:25           Post Anesthesia Care and Evaluation    Patient location during evaluation: PHASE II  Patient participation: complete - patient participated  Level of consciousness: awake and alert  Pain management: adequate    Airway patency: patent  Anesthetic complications: No anesthetic complications  PONV Status: none  Cardiovascular status: acceptable  Respiratory status: acceptable  Hydration status: acceptable  No anesthesia care post op

## 2025-04-25 NOTE — H&P
Chief Complaint:   History of colon cancer    Subjective     HPI:   Colon cancer found 3/2022 resulting in left colon resection.  1 year colonoscopy was delayed due to pulmonary embolus and need for anticoagulation.  This is now his first colonoscopy since surgery.    Past Medical History:   Past Medical History:   Diagnosis Date    Ankylosing spondylitis     Anxiety     Diabetes mellitus     Family history of colon cancer     Family history of colonic polyps     GERD (gastroesophageal reflux disease)     Gout     Hard to intubate     History of colon cancer     Hyperlipidemia     Hypertension     Kidney stone     Osteoarthritis     Pulmonary embolism     Sleep apnea        Past Surgical History:  Past Surgical History:   Procedure Laterality Date    APPENDECTOMY      taken out during colon resection    CHOLECYSTECTOMY      Dr. Aragon    COLON RESECTION N/A 04/08/2022    Procedure: HAND ASSISTED LAPAROSCOPIC SIGMOID COLECTOMY;  Surgeon: Noreen Aragon MD;  Location: [facility] OR;  Service: General;  Laterality: N/A;    COLONOSCOPY N/A 03/18/2022    Likely malignant partially obstructing tumor in the transverse colon-biopsied-Tattooed; Repeat colonoscopy (date not yet determined) because the examination was incomplete    ENDOSCOPY N/A 03/18/2022    Normal esophagus; Non-erosive gastritis-biopsied; Normal examined duodenum-biopsied        Family History:  Family History   Problem Relation Age of Onset    Cirrhosis Father 47    Alcohol abuse Father     Colon polyps Brother         < 60 years of age    Colon cancer Maternal Aunt         In her 40's    Alcohol abuse Paternal Aunt     Colon cancer Paternal Uncle         In his 60's    Alcohol abuse Paternal Uncle     Alcohol abuse Maternal Grandfather     Esophageal cancer Neg Hx     Liver cancer Neg Hx     Liver disease Neg Hx     Rectal cancer Neg Hx     Stomach cancer Neg Hx        Social History:   reports that he has never smoked. He has never used smokeless  "tobacco. He reports that he does not drink alcohol and does not use drugs.    Medications:   Medications Prior to Admission   Medication Sig Dispense Refill Last Dose/Taking    allopurinol (ZYLOPRIM) 300 MG tablet Take 1 tablet by mouth Daily.   4/24/2025 Morning    dapagliflozin-metformin HCl ER (XIGDUO XR)  MG tablet Take 1 tablet by mouth Daily.   4/24/2025 Morning    lisinopril-hydrochlorothiazide (PRINZIDE,ZESTORETIC) 20-25 MG per tablet Take 1 tablet by mouth Daily.   4/24/2025 Morning    predniSONE (DELTASONE) 5 MG tablet Take 1 tablet by mouth Daily.   4/24/2025 Evening    atenolol (TENORMIN) 25 MG tablet Take 1 tablet by mouth Daily.   4/23/2025 Evening    Eliquis 5 MG tablet tablet Take 1 tablet by mouth 2 (Two) Times a Day.   4/22/2025    Enbrel SureClick 50 MG/ML solution auto-injector Inject 1 mL under the skin into the appropriate area as directed 1 (One) Time Per Week.   4/14/2025    folic acid (FOLVITE) 1 MG tablet Take 1 tablet by mouth Daily.   4/23/2025       Allergies:  Patient has no known allergies.    ROS:    Resp: No SOA  Cardiovascular: No CP      Objective     /79 (Patient Position: Sitting)   Pulse 109   Temp 96.8 °F (36 °C) (Temporal)   Resp 22   Ht 154.9 cm (61\")   Wt 102 kg (224 lb)   SpO2 92%   BMI 42.32 kg/m²     Physical Exam   Constitutional: Patient is oriented to person, place, and in no distress.  Pulmonary/Chest: No distress.  No audible wheezes  Psychiatric: Mood, memory, affect and judgment appear normal.     Assessment & Plan     Diagnosis:  History of colon cancer    Anticipated Surgical Procedure:  Colonoscopy    The risks, benefits, and alternatives of colonoscopy were reviewed with the patient today.  Risks including perforation of the colon possibly requiring surgery or colostomy.  Additional risks include risk of bleeding from biopsies or removal of colon tissue.  There is also the risk of a drug reaction or problems with anesthesia.  This will be " discussed with the patient further by the anesthesia team on the day of the procedure.  Lastly there is a possibility of missing a colon polyp or cancer.  The benefits include the diagnosis and management of disease of the colon and rectum.  Alternatives to colonoscopy include barium enema, laboratory testing, radiographic evaluation, or no intervention.  The patient verbalizes understanding and agrees.        Please note that portions of this note were completed with a voice recognition program.

## 2025-07-10 ENCOUNTER — TELEPHONE (OUTPATIENT)
Dept: HEMATOLOGY | Age: 50
End: 2025-07-10

## 2025-07-10 DIAGNOSIS — Z85.038 PERSONAL HISTORY OF COLON CANCER: Primary | ICD-10-CM

## 2025-07-10 DIAGNOSIS — D75.1 ERYTHROCYTOSIS: ICD-10-CM

## 2025-07-10 NOTE — TELEPHONE ENCOUNTER

## 2025-07-15 ENCOUNTER — TELEPHONE (OUTPATIENT)
Dept: HEMATOLOGY | Age: 50
End: 2025-07-15

## 2025-08-13 ENCOUNTER — TELEPHONE (OUTPATIENT)
Dept: HEMATOLOGY | Age: 50
End: 2025-08-13

## 2025-08-15 DIAGNOSIS — D75.1 ERYTHROCYTOSIS: Primary | ICD-10-CM

## 2025-08-18 ENCOUNTER — OFFICE VISIT (OUTPATIENT)
Dept: HEMATOLOGY | Age: 50
End: 2025-08-18
Payer: COMMERCIAL

## 2025-08-18 ENCOUNTER — HOSPITAL ENCOUNTER (OUTPATIENT)
Dept: INFUSION THERAPY | Age: 50
Discharge: HOME | End: 2025-08-18
Payer: COMMERCIAL

## 2025-08-18 VITALS
WEIGHT: 229.2 LBS | OXYGEN SATURATION: 96 % | DIASTOLIC BLOOD PRESSURE: 80 MMHG | SYSTOLIC BLOOD PRESSURE: 136 MMHG | HEART RATE: 74 BPM | TEMPERATURE: 97.7 F | HEIGHT: 63 IN | BODY MASS INDEX: 40.61 KG/M2

## 2025-08-18 DIAGNOSIS — D75.1 ERYTHROCYTOSIS: ICD-10-CM

## 2025-08-18 DIAGNOSIS — K76.0 HEPATIC STEATOSIS: ICD-10-CM

## 2025-08-18 DIAGNOSIS — R77.8 ELEVATED TOTAL PROTEIN: ICD-10-CM

## 2025-08-18 DIAGNOSIS — Z85.038 PERSONAL HISTORY OF COLON CANCER: Primary | ICD-10-CM

## 2025-08-18 DIAGNOSIS — Z79.01 CHRONIC ANTICOAGULATION: ICD-10-CM

## 2025-08-18 DIAGNOSIS — Z86.711 HISTORY OF PULMONARY EMBOLUS (PE): ICD-10-CM

## 2025-08-18 LAB
BASOPHILS # BLD: 0.07 K/UL (ref 0–0.2)
BASOPHILS NFR BLD: 1 % (ref 0–1)
EOSINOPHIL # BLD: 0.23 K/UL (ref 0–0.6)
EOSINOPHIL NFR BLD: 3.2 % (ref 0–5)
ERYTHROCYTE [DISTWIDTH] IN BLOOD BY AUTOMATED COUNT: 13.2 % (ref 11.5–14.5)
HCT VFR BLD AUTO: 49.1 % (ref 42–52)
HGB BLD-MCNC: 16.5 G/DL (ref 14–18)
LYMPHOCYTES # BLD: 1.26 K/UL (ref 1.1–4.5)
LYMPHOCYTES NFR BLD: 17.4 % (ref 20–40)
MCH RBC QN AUTO: 31.5 PG (ref 27–31)
MCHC RBC AUTO-ENTMCNC: 33.6 G/DL (ref 33–37)
MCV RBC AUTO: 93.9 FL (ref 80–94)
MONOCYTES # BLD: 0.79 K/UL (ref 0–0.9)
MONOCYTES NFR BLD: 10.9 % (ref 1–10)
NEUTROPHILS # BLD: 4.89 K/UL (ref 1.5–7.5)
NEUTS SEG NFR BLD: 67.2 % (ref 50–65)
PLATELET # BLD AUTO: 182 K/UL (ref 130–400)
PMV BLD AUTO: 10.6 FL (ref 9.4–12.4)
RBC # BLD AUTO: 5.23 M/UL (ref 4.7–6.1)
WBC # BLD AUTO: 7.26 K/UL (ref 4.8–10.8)

## 2025-08-18 PROCEDURE — 36415 COLL VENOUS BLD VENIPUNCTURE: CPT

## 2025-08-18 PROCEDURE — 99211 OFF/OP EST MAY X REQ PHY/QHP: CPT

## 2025-08-18 PROCEDURE — 85025 COMPLETE CBC W/AUTO DIFF WBC: CPT

## 2025-08-18 PROCEDURE — 3075F SYST BP GE 130 - 139MM HG: CPT | Performed by: PHYSICIAN ASSISTANT

## 2025-08-18 PROCEDURE — 99213 OFFICE O/P EST LOW 20 MIN: CPT | Performed by: PHYSICIAN ASSISTANT

## 2025-08-18 PROCEDURE — 3079F DIAST BP 80-89 MM HG: CPT | Performed by: PHYSICIAN ASSISTANT

## 2025-08-18 ASSESSMENT — ENCOUNTER SYMPTOMS
VOMITING: 0
ABDOMINAL DISTENTION: 0
CONSTIPATION: 0
BACK PAIN: 1
ABDOMINAL PAIN: 0
BLOOD IN STOOL: 0
NAUSEA: 0
PHOTOPHOBIA: 0
SORE THROAT: 0
DIARRHEA: 0
EYE ITCHING: 0
WHEEZING: 0
COUGH: 0
SHORTNESS OF BREATH: 0
COLOR CHANGE: 0
VOICE CHANGE: 0
EYE DISCHARGE: 0
TROUBLE SWALLOWING: 0

## (undated) DEVICE — SENSR O2 OXIMAX FNGR A/ 18IN NONSTR

## (undated) DEVICE — ARGYLE YANKAUER BULB TIP WITH VENT: Brand: ARGYLE

## (undated) DEVICE — PK TURNOVER RM ADV

## (undated) DEVICE — 3M™ STERI-STRIP™ REINFORCED ADHESIVE SKIN CLOSURES, R1547, 1/2 IN X 4 IN (12 MM X 100 MM), 6 STRIPS/ENVELOPE: Brand: 3M™ STERI-STRIP™

## (undated) DEVICE — LEGGINGS, PAIR, 33X51 XL, STERILE: Brand: MEDLINE

## (undated) DEVICE — CUFF,BP,DISP,1 TUBE,ADULT,HP: Brand: MEDLINE

## (undated) DEVICE — DEFENDO AIR WATER SUCTION AND BIOPSY VALVE KIT FOR  OLYMPUS: Brand: DEFENDO AIR/WATER/SUCTION AND BIOPSY VALVE

## (undated) DEVICE — ENDOPATH PNEUMONEEDLE INSUFFLATION NEEDLES WITH LUER LOCK CONNECTORS 120MM: Brand: ENDOPATH

## (undated) DEVICE — FRCP BIOP ENDO CAPTURAPRO SPK SERR 2.8MM 230CM

## (undated) DEVICE — ACCESS PLATFORM FOR MINIMALLY INVASIVE SURGERY.: Brand: GELPORT® LAPAROSCOPIC  SYSTEM

## (undated) DEVICE — CONMED SCOPE SAVER BITE BLOCK, 20X27 MM: Brand: SCOPE SAVER

## (undated) DEVICE — NDL SCLEROTHRPY INTERJECT 25G 4 240 CLR

## (undated) DEVICE — SUT SILK 2/0 SH 30IN K833H

## (undated) DEVICE — GLV SURG BIOGEL LTX PF 6 1/2

## (undated) DEVICE — SUT PDS LP 1 TP1 96IN VIO PDP880GA

## (undated) DEVICE — YANKAUER,BULB TIP WITH VENT: Brand: ARGYLE

## (undated) DEVICE — TUBING, SUCTION, 1/4" X 12', STRAIGHT: Brand: MEDLINE

## (undated) DEVICE — TRAP FLD MINIVAC MEGADYNE 100ML

## (undated) DEVICE — 3M™ IOBAN™ 2 ANTIMICROBIAL INCISE DRAPE 6650EZ: Brand: IOBAN™ 2

## (undated) DEVICE — ENDOPATH XCEL WITH OPTIVIEW TECHNOLOGY DILATING TIP TROCARS WITH STABILITY SLEEVES: Brand: ENDOPATH XCEL OPTIVIEW

## (undated) DEVICE — ANTIBACTERIAL UNDYED BRAIDED (POLYGLACTIN 910), SYNTHETIC ABSORBABLE SUTURE: Brand: COATED VICRYL

## (undated) DEVICE — MONOPOLAR METZENBAUM SCISSOR, MINI BLADE TIP, DISPOSABLE: Brand: MONOPOLAR METZENBAUM SCISSOR, MINI BLADE TIP, DISPOSABLE

## (undated) DEVICE — TBG PENCL TELESCP MEGADYNE SMOKE EVAC 10FT

## (undated) DEVICE — INTENDED FOR TISSUE SEPARATION, AND OTHER PROCEDURES THAT REQUIRE A SHARP SURGICAL BLADE TO PUNCTURE OR CUT.: Brand: BARD-PARKER ® STAINLESS STEEL BLADES

## (undated) DEVICE — PAD LAP CHOLE: Brand: MEDLINE INDUSTRIES, INC.

## (undated) DEVICE — VAGINAL PREP TRAY: Brand: MEDLINE INDUSTRIES, INC.

## (undated) DEVICE — ELECTRD L HK EZ CLN 33CM

## (undated) DEVICE — IRRIGATOR BULB ASEPTO 60CC STRL

## (undated) DEVICE — POOLE SUCTION INSTRUMENT WITH REMOVABLE SHEATH: Brand: POOLE

## (undated) DEVICE — Device: Brand: DEFENDO AIR/WATER/SUCTION AND BIOPSY VALVE

## (undated) DEVICE — MASK,OXYGEN,MED CONC,ADLT,7' TUB, UC: Brand: PENDING

## (undated) DEVICE — SUT SILK 2/0 SH CR8 18IN CR8 C012D

## (undated) DEVICE — SPNG GZ WOVN 4X4IN 12PLY 10/BX STRL

## (undated) DEVICE — TBG SMPL FLTR LINE NASL 02/C02 A/ BX/100

## (undated) DEVICE — PENCL ES MEGADINE EZ/CLEAN BUTN W/HOLSTR 10FT

## (undated) DEVICE — THE CHANNEL CLEANING BRUSH IS A NYLON FLEXI BRUSH ATTACHED TO A FLEXIBLE PLASTIC SHEATH DESIGNED TO SAFELY REMOVE DEBRIS FROM FLEXIBLE ENDOSCOPES.

## (undated) DEVICE — PISTOL GRIP SKIN STAPLER: Brand: MULTIFIRE PREMIUM

## (undated) DEVICE — ENDOPATH XCEL DILATING TIP TROCARS WITH STABILITY SLEEVES: Brand: ENDOPATH XCEL

## (undated) DEVICE — SPNG LAP PREWSH SFTPK 18X18IN STRL PK/5

## (undated) DEVICE — ENSEAL 20 CM SHAFT, LARGE JAW: Brand: ENSEAL X1

## (undated) DEVICE — DRSNG SURESITE WNDW 4X4.5

## (undated) DEVICE — ENDOPATH XCEL WITH OPTIVIEW TECHNOLOGY UNIVERSAL TROCAR STABILITY SLEEVES: Brand: ENDOPATH XCEL OPTIVIEW

## (undated) DEVICE — TOTAL TRAY, 16FR 10ML SIL FOLEY, URN: Brand: MEDLINE

## (undated) DEVICE — 4-PORT MANIFOLD: Brand: NEPTUNE 2

## (undated) DEVICE — SUT SILK 2/0 SUTUPAK TIES 24IN SA75H